# Patient Record
Sex: FEMALE | Race: WHITE | NOT HISPANIC OR LATINO | Employment: OTHER | URBAN - METROPOLITAN AREA
[De-identification: names, ages, dates, MRNs, and addresses within clinical notes are randomized per-mention and may not be internally consistent; named-entity substitution may affect disease eponyms.]

---

## 2019-08-11 LAB
HCV AB SER-ACNC: NEGATIVE
HIV 1+2 AB+HIV1 P24 AG SERPL QL IA: NEGATIVE

## 2019-12-11 ENCOUNTER — TELEPHONE (OUTPATIENT)
Dept: PRIMARY CARE CLINIC | Facility: CLINIC | Age: 49
End: 2019-12-11

## 2019-12-11 NOTE — TELEPHONE ENCOUNTER
Called patient verified medicaid insurance and notified that we can no accept new medicaid patients states understanding

## 2019-12-11 NOTE — TELEPHONE ENCOUNTER
----- Message from Ros García sent at 12/11/2019 12:11 PM CST -----  Contact: Pt  Pt is requesting an appt due to establish care    Pt can be reached at [phone]

## 2020-05-26 ENCOUNTER — OFFICE VISIT (OUTPATIENT)
Dept: OBSTETRICS AND GYNECOLOGY | Facility: CLINIC | Age: 50
End: 2020-05-26
Payer: MEDICAID

## 2020-05-26 VITALS
BODY MASS INDEX: 38.21 KG/M2 | DIASTOLIC BLOOD PRESSURE: 100 MMHG | WEIGHT: 252.13 LBS | SYSTOLIC BLOOD PRESSURE: 126 MMHG | HEIGHT: 68 IN

## 2020-05-26 DIAGNOSIS — R87.619 ABNORMAL CERVICAL PAPANICOLAOU SMEAR, UNSPECIFIED ABNORMAL PAP FINDING: ICD-10-CM

## 2020-05-26 DIAGNOSIS — R10.2 PELVIC PAIN: Primary | ICD-10-CM

## 2020-05-26 DIAGNOSIS — N94.2 VAGINISMUS: ICD-10-CM

## 2020-05-26 PROCEDURE — 88175 CYTOPATH C/V AUTO FLUID REDO: CPT

## 2020-05-26 PROCEDURE — 87624 HPV HI-RISK TYP POOLED RSLT: CPT

## 2020-05-26 PROCEDURE — 99214 OFFICE O/P EST MOD 30 MIN: CPT | Mod: PBBFAC,PN | Performed by: OBSTETRICS & GYNECOLOGY

## 2020-05-26 PROCEDURE — 87481 CANDIDA DNA AMP PROBE: CPT | Mod: 59

## 2020-05-26 PROCEDURE — 87661 TRICHOMONAS VAGINALIS AMPLIF: CPT

## 2020-05-26 PROCEDURE — 99204 PR OFFICE/OUTPT VISIT, NEW, LEVL IV, 45-59 MIN: ICD-10-PCS | Mod: S$PBB,,, | Performed by: OBSTETRICS & GYNECOLOGY

## 2020-05-26 PROCEDURE — 99999 PR PBB SHADOW E&M-EST. PATIENT-LVL IV: CPT | Mod: PBBFAC,,, | Performed by: OBSTETRICS & GYNECOLOGY

## 2020-05-26 PROCEDURE — 99204 OFFICE O/P NEW MOD 45 MIN: CPT | Mod: S$PBB,,, | Performed by: OBSTETRICS & GYNECOLOGY

## 2020-05-26 PROCEDURE — 99999 PR PBB SHADOW E&M-EST. PATIENT-LVL IV: ICD-10-PCS | Mod: PBBFAC,,, | Performed by: OBSTETRICS & GYNECOLOGY

## 2020-05-26 RX ORDER — ZOLPIDEM TARTRATE 10 MG/1
TABLET ORAL
COMMUNITY
Start: 2020-05-18 | End: 2022-07-18 | Stop reason: ALTCHOICE

## 2020-05-26 RX ORDER — TRAZODONE HYDROCHLORIDE 50 MG/1
TABLET ORAL
COMMUNITY
Start: 2020-05-12 | End: 2020-07-14

## 2020-05-26 RX ORDER — ERGOCALCIFEROL 1.25 MG/1
CAPSULE ORAL
COMMUNITY
Start: 2020-05-01

## 2020-05-26 RX ORDER — ALPRAZOLAM 0.5 MG/1
TABLET ORAL
COMMUNITY
Start: 2020-05-12 | End: 2021-11-29

## 2020-05-26 NOTE — PATIENT INSTRUCTIONS
[facility] (all take Medicaid):  1)  [facility] (Beverly Nelson): (p) [phone]. (f) [phone]. Established patients call:  [phone].  2)  [facility]/Trudi Nuñez: (p) [phone]  . (f) [phone].    3)  [facility] (Miranda Cordoba Michael or Kira López): (p) [phone].  Or [phone].   4)  [facility] [place]. (p) [phone].    5)  [facility] (Eleonora Roach): [address] [facility], [place]   [address]. Patients can park in the north entrance and it is on the first floor. (p) [phone] (Melany Yeung, ). (f) [phone].    --no men or defecatory disorders  6)  [facility] (Princess Bowen): (p) [phone].     [facility]:    [place]:  Dr. Sandy Nunez ([facility]: [address], [place], [place] [address]).  (p)  [phone].  (f) [phone]    --Medicaid as secondary/not primary    Tyesha Cee ([place]): (p) [phone].  (f) [phone].    --Medicaid as secondary/not primary    Mariesa Strangos.  [facility].  [address] [phone]  [place] [address].  (p) [phone].  (f) [phone].  --Medicaid as secondary/not primary    Sandy Garcia ([facility]:  [address]):  (p) [phone]. (f) [phone].   --Medicaid as secondary with Medicare/not primary    Greg Barbosa (full-out until Fall 2019), Kimberly Dominguez (full) ([place]). (p) [phone] (f) [phone]. --Takes Medicaid.     [place]:  Tayla Shah ([facility] [place]): (p) [phone].  (f) [phone].  --NO MEDICAID    Gracie Serrano ([facility]: [address] [place], [place] [address]).  Phone: [phone].  Fax:  [phone].  --Medicaid as secondary/not primary    EAST:  --none    WEST:  [facility]  Irene Barillas.  [address] [place] [address].  p [phone].  f  [phone].  --Medicaid as secondary with Medicare or [employer]/not primary

## 2020-05-26 NOTE — PROGRESS NOTES
History & Physical  Gynecology      SUBJECTIVE:     Chief Complaint: Vaginal Pain and Pelvic Pain       History of Present Illness:  51 y/o  reports with complaints of 1 year of pelvic and vulvar pain. Reports a dull ache and also some possibly form of vaginismus, reports pain with penetration and inability to to have intercourse. Possible early menopause, reports night sweats, possible hot flushes.    Had ovarian cysts diagnosed at outside facility. Reports frequent bowel movements and discomfort, alternating constipation and diarrhea, has never been told has IBS.    Surg hx: 21 years ago had hysterectomy with c\section at 6 months PPROM; was told prior to pregnancy she had hyperplasia; did not have oophorectomy at that time. Colon resection due to sepsis after PPROM and C hyst. Gastric bypass. History of 2 c sections total.    Pap hx: abnormal pap smear 4 years ago, did not get follow-up, history of cryotherapy in the past, likely the reasons for the continued pap smears after hystectomy.      Review of patient's allergies indicates:  No Known Allergies    Past Medical History:   Diagnosis Date    Anxiety     Rheumatoid arthritis     Thyroid disease      Past Surgical History:   Procedure Laterality Date     SECTION      GASTRIC BYPASS      HYSTERECTOMY      KNEE CARTILAGE SURGERY Bilateral     x2 to Right, x1 to Left     OB History        3    Para   3    Term                AB        Living           SAB        TAB        Ectopic        Multiple        Live Births                   Family History   Problem Relation Age of Onset    Breast cancer Maternal Grandmother     Colon cancer Neg Hx     Ovarian cancer Neg Hx      Social History     Tobacco Use    Smoking status: Never Smoker    Smokeless tobacco: Never Used   Substance Use Topics    Alcohol use: Yes     Frequency: Never     Comment: occasionally    Drug use: Not Currently       Current Outpatient Medications    Medication Sig    buPROPion (WELLBUTRIN XL) 300 MG 24 hr tablet Take 300 mg by mouth once daily.    cyanocobalamin, vitamin B-12, 1,000 mcg Subl Place under the tongue.    dextroamphetamine-amphetamine (ADDERALL XR) 30 MG 24 hr capsule Take 30 mg by mouth.    DULoxetine (CYMBALTA) 60 MG capsule Take 60 mg by mouth once daily.    ergocalciferol (ERGOCALCIFEROL) 50,000 unit Cap     ferrous sulfate (FEOSOL) 325 mg (65 mg iron) Tab tablet Take 325 mg by mouth.    furosemide (LASIX) 20 MG tablet Take 20 mg by mouth.    levothyroxine (SYNTHROID) 50 MCG tablet Take 50 mcg by mouth once daily.    ALPRAZolam (XANAX) 0.5 MG tablet     conjugated estrogens (PREMARIN) vaginal cream Place a pea-sized amount in vagina every night for 2 weeks, then use 2-3 nights a week    diazepam 10 mg vaginal suppository Place 1 suppository (10 mg total) vaginally once. This compounded medication expires in 30 days for 1 dose    gabapentin (NEURONTIN) 300 MG capsule     HYDROcodone-acetaminophen (NORCO) 5-325 mg per tablet TK 1 T PO Q 6 H PRN FOR PAIN . MAX 4 T DAILY    traZODone (DESYREL) 50 MG tablet     zolpidem (AMBIEN) 10 mg Tab      No current facility-administered medications for this visit.          Review of Systems:  Review of Systems   Constitutional: Negative for appetite change, chills, diaphoresis, fatigue and fever.   Respiratory: Negative for cough and shortness of breath.    Cardiovascular: Negative for chest pain and palpitations.   Gastrointestinal: Negative.  Negative for abdominal pain, constipation, diarrhea, nausea and vomiting.   Genitourinary: Positive for decreased libido, dyspareunia, vaginal pain and vaginal dryness. Negative for bladder incontinence, dysmenorrhea, dysuria, flank pain, frequency, menstrual problem, pelvic pain, urgency, vaginal bleeding, vaginal discharge, postcoital bleeding and vaginal odor.        OBJECTIVE:     Physical Exam:  Physical Exam   Constitutional: She appears  well-developed and well-nourished.   Neck: No tracheal deviation present. No thyromegaly present.   Cardiovascular: Normal rate and regular rhythm. Exam reveals no gallop and no friction rub.   No murmur heard.  Pulmonary/Chest: Effort normal and breath sounds normal. No stridor. No respiratory distress. She has no wheezes. She has no rales. She exhibits no tenderness.   Abdominal: Soft. Bowel sounds are normal.   Genitourinary: No vaginal discharge found.   Genitourinary Comments: Painful pelvic exam for patient, tight levator ani, pain with palpation throughout, vaginismus noted with speculum placement. Punctate bleeding suggestive of low estrogen status.   Lymphadenopathy:     She has no cervical adenopathy.         ASSESSMENT:       ICD-10-CM ICD-9-CM    1. Pelvic pain R10.2 [id number] US Pelvis Comp with Transvag NON-OB (xpd      diazepam 10 mg vaginal suppository      conjugated estrogens (PREMARIN) vaginal cream   2. Abnormal cervical Papanicolaou smear, unspecified abnormal pap finding R87.619 795.00 HPV High Risk Genotypes, PCR      Liquid-Based Pap Smear, Screening   3. Vaginismus N94.2 625.1 diazepam 10 mg vaginal suppository          Plan:      Gisela was seen today for vaginal pain and pelvic pain.    Diagnoses and all orders for this visit:    Pelvic pain  -     US Pelvis Comp with Transvag NON-OB (xpd; Future  -     diazepam 10 mg vaginal suppository; Place 1 suppository (10 mg total) vaginally once. This compounded medication expires in 30 days for 1 dose  -     conjugated estrogens (PREMARIN) vaginal cream; Place a pea-sized amount in vagina every night for 2 weeks, then use 2-3 nights a week    Abnormal cervical Papanicolaou smear, unspecified abnormal pap finding  -     HPV High Risk Genotypes, PCR  -     Liquid-Based Pap Smear, Screening    Vaginismus  -     diazepam 10 mg vaginal suppository; Place 1 suppository (10 mg total) vaginally once. This compounded medication expires in 30 days for 1  dose      Referall placed for pelvic floor PT, f/u in 1 month.    Orders Placed This Encounter   Procedures    HPV High Risk Genotypes, PCR    US Pelvis Comp with Transvag NON-OB (xpd       Follow up in about 1 month (around 6/26/2020) for Pelvic pain.     Counseling time: 30 minutes    LISA Fernandez

## 2020-05-27 LAB
BACTERIAL VAGINOSIS DNA: NEGATIVE
CANDIDA GLABRATA DNA: NEGATIVE
CANDIDA KRUSEI DNA: NEGATIVE
CANDIDA RRNA VAG QL PROBE: NEGATIVE
T VAGINALIS RRNA GENITAL QL PROBE: NEGATIVE

## 2020-05-28 LAB
FINAL PATHOLOGIC DIAGNOSIS: NORMAL
Lab: NORMAL

## 2020-06-02 ENCOUNTER — TELEPHONE (OUTPATIENT)
Dept: OBSTETRICS AND GYNECOLOGY | Facility: CLINIC | Age: 50
End: 2020-06-02

## 2020-06-02 LAB
HPV HR 12 DNA SPEC QL NAA+PROBE: NEGATIVE
HPV16 AG SPEC QL: NEGATIVE
HPV18 DNA SPEC QL NAA+PROBE: NEGATIVE

## 2020-06-02 NOTE — TELEPHONE ENCOUNTER
----- Message from Asim Souza sent at 6/2/2020  4:27 PM CDT -----  Contact: Patient   Patient is calling in on behalf of test results would like to speak w/ staff. Patient contact number [phone]

## 2020-07-07 ENCOUNTER — TELEPHONE (OUTPATIENT)
Dept: PRIMARY CARE CLINIC | Facility: CLINIC | Age: 50
End: 2020-07-07

## 2020-07-07 NOTE — TELEPHONE ENCOUNTER
----- Message from Huma Velazco sent at 7/7/2020  1:11 PM CDT -----  Regarding: Ride to up coming appointment  Contact: Matt Du @ [phone]  Good Afternoon  Patient would like a ride schedule to appointment for tomorrow  Please call and advise

## 2020-07-07 NOTE — TELEPHONE ENCOUNTER
Phoned , pt states she was just in ER and told because of CT results she has to get a biopsy and has to est. With a pcp to get the ref she needs. States she needs a ride to her appt tomorrow. Informed pt it is against HIPPAA for us to give out any information and we do not schedule rides for pts. Instructed pt to contact her ins company or the ride company again. Pt states she will contact ride company

## 2020-07-07 NOTE — TELEPHONE ENCOUNTER
----- Message from Nkechi Mathew sent at 7/7/2020 12:58 PM CDT -----  The patient said the transportation company will pick her up for her appointment tomorrow only if you call. She said it's urgent that she see you tomorrow because, the ER said she needs to get a biopsy. Normally the transportation company require 3 days advance notice.     Please call [phone] to schedule a ride for the patient.    Thank you

## 2020-07-07 NOTE — TELEPHONE ENCOUNTER
Pt said her ins co told her to have doctors ofc call and speak to ride company and tell them she has an appt tomorrow. Informed pt that is confidential information and she has to call the [employer] herself. Pt states understanding

## 2020-07-10 ENCOUNTER — TELEPHONE (OUTPATIENT)
Dept: PRIMARY CARE CLINIC | Facility: CLINIC | Age: 50
End: 2020-07-10

## 2020-07-10 NOTE — TELEPHONE ENCOUNTER
----- Message from Eulalia Juniorry sent at 7/10/2020 11:11 AM CDT -----  Regarding: appt  Contact: self [phone]  Pt states she missed appt on Wednesday with dr canales due to being late. Pt would like to set up another appt sooner to see dr canales for ER f/u appt. Pt states she would like something soon because she needs to have a biopsy done of her neck. Please call and advise

## 2020-07-14 ENCOUNTER — OFFICE VISIT (OUTPATIENT)
Dept: PRIMARY CARE CLINIC | Facility: CLINIC | Age: 50
End: 2020-07-14
Payer: MEDICAID

## 2020-07-14 VITALS
BODY MASS INDEX: 36.77 KG/M2 | HEIGHT: 68 IN | DIASTOLIC BLOOD PRESSURE: 88 MMHG | RESPIRATION RATE: 18 BRPM | SYSTOLIC BLOOD PRESSURE: 130 MMHG | WEIGHT: 242.63 LBS | TEMPERATURE: 99 F | OXYGEN SATURATION: 98 % | HEART RATE: 88 BPM

## 2020-07-14 DIAGNOSIS — F31.9 BIPOLAR 1 DISORDER: Primary | ICD-10-CM

## 2020-07-14 DIAGNOSIS — F43.10 PTSD (POST-TRAUMATIC STRESS DISORDER): ICD-10-CM

## 2020-07-14 DIAGNOSIS — M54.2 NECK PAIN: ICD-10-CM

## 2020-07-14 PROCEDURE — 99999 PR PBB SHADOW E&M-EST. PATIENT-LVL IV: CPT | Mod: PBBFAC,,, | Performed by: FAMILY MEDICINE

## 2020-07-14 PROCEDURE — 99204 PR OFFICE/OUTPT VISIT, NEW, LEVL IV, 45-59 MIN: ICD-10-PCS | Mod: S$PBB,,, | Performed by: FAMILY MEDICINE

## 2020-07-14 PROCEDURE — 99204 OFFICE O/P NEW MOD 45 MIN: CPT | Mod: S$PBB,,, | Performed by: FAMILY MEDICINE

## 2020-07-14 PROCEDURE — 99999 PR PBB SHADOW E&M-EST. PATIENT-LVL IV: ICD-10-PCS | Mod: PBBFAC,,, | Performed by: FAMILY MEDICINE

## 2020-07-14 PROCEDURE — 99214 OFFICE O/P EST MOD 30 MIN: CPT | Mod: PBBFAC,PN | Performed by: FAMILY MEDICINE

## 2020-07-14 RX ORDER — ARIPIPRAZOLE 10 MG/1
10 TABLET ORAL DAILY
Qty: 30 TABLET | Refills: 0 | Status: SHIPPED | OUTPATIENT
Start: 2020-07-14 | End: 2021-11-29

## 2020-07-14 RX ORDER — GABAPENTIN 300 MG/1
300 CAPSULE ORAL NIGHTLY
Qty: 90 CAPSULE | Refills: 0 | Status: SHIPPED | OUTPATIENT
Start: 2020-07-14 | End: 2020-10-14 | Stop reason: SDUPTHER

## 2020-07-14 NOTE — PROGRESS NOTES
"Subjective:       Patient ID: Gisela Monique is a 50 y.o. female.    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 Previously saw her PCP Quoc Hurd. She is most concerned about her swollen lymph nodes in her neck she is concerned is lymphoma. Her biggest complaint today is her neck pain which she blames on osteopenia.   She is not satisfied with her psychiatrist as well. She takes a multiple medicines for sleep and depression but not on a bipolar medicine. Taking wellbutrin 300 mg and duloxetine 60 mg with no recent adjustments. Takes a xanex not on a daily basis and would like to see another psychiatrist as well as a therapist. Her last manic episode was a month ago.      Review of Systems   Constitutional: Negative for activity change, chills and fever.   Respiratory: Negative for cough, chest tightness and shortness of breath.    Cardiovascular: Negative for chest pain.   Gastrointestinal: Negative for abdominal distention, abdominal pain, constipation, diarrhea, nausea and vomiting.   Genitourinary: Negative for difficulty urinating and dysuria.   Musculoskeletal: Positive for arthralgias, neck pain and neck stiffness.   Skin: Negative for rash.   Hematological: Does not bruise/bleed easily.   Psychiatric/Behavioral: Positive for agitation. The patient is nervous/anxious.        Objective:      Vitals:    07/14/20 0858   BP: 130/88   BP Location: Right arm   Patient Position: Sitting   BP Method: Large (Manual)   Pulse: 88   Resp: 18   Temp: 99.2 °F (37.3 °C)   TempSrc: Oral   SpO2: 98%   Weight: 110.1 kg (242 lb 9.9 oz)   Height: 5' 8" (1.727 m)     Physical Exam  Vitals signs and nursing note reviewed.   Constitutional:       Appearance: She is well-developed.   HENT:      Head: Normocephalic and " atraumatic.      Nose: Nose normal.   Eyes:      Conjunctiva/sclera: Conjunctivae normal.   Cardiovascular:      Rate and Rhythm: Normal rate and regular rhythm.      Heart sounds: Normal heart sounds.   Pulmonary:      Effort: Pulmonary effort is normal. No respiratory distress.      Breath sounds: Normal breath sounds. No wheezing or rales.   Abdominal:      General: There is no distension.      Palpations: Abdomen is soft.      Tenderness: There is no abdominal tenderness.   Lymphadenopathy:      Cervical: No cervical adenopathy.   Neurological:      Mental Status: She is alert.      Cranial Nerves: No cranial nerve deficit.             Lab Results   Component Value Date     07/05/2020    K 3.6 07/05/2020     07/05/2020    CO2 21 (L) 07/05/2020    BUN 24 (H) 07/05/2020    CREATININE 0.7 07/05/2020    ANIONGAP 12 07/05/2020     No results found for: HGBA1C  No results found for: BNP, BNPTRIAGEBLO    Lab Results   Component Value Date    WBC 7.80 07/05/2020    HGB 12.5 07/05/2020    HCT 37.3 07/05/2020     07/05/2020    GRAN 5.8 07/05/2020    GRAN 75.0 (H) 07/05/2020     No results found for: CHOL, HDL, LDLCALC, TRIG       Current Outpatient Medications:     ALPRAZolam (XANAX) 0.5 MG tablet, , Disp: , Rfl:     conjugated estrogens (PREMARIN) vaginal cream, Place a pea-sized amount in vagina every night for 2 weeks, then use 2-3 nights a week, Disp: 45 g, Rfl: 12    cyanocobalamin, vitamin B-12, 1,000 mcg Subl, Place under the tongue., Disp: , Rfl:     dextroamphetamine-amphetamine (ADDERALL XR) 30 MG 24 hr capsule, Take 30 mg by mouth., Disp: , Rfl:     dicyclomine (BENTYL) 20 mg tablet, Take 1 tablet (20 mg total) by mouth 3 (three) times daily as needed (abdominal cramps)., Disp: 20 tablet, Rfl: 0    DULoxetine (CYMBALTA) 60 MG capsule, Take 60 mg by mouth once daily., Disp: , Rfl:     ergocalciferol (ERGOCALCIFEROL) 50,000 unit Cap, , Disp: , Rfl:     furosemide (LASIX) 20 MG tablet,  Take 20 mg by mouth., Disp: , Rfl:     gabapentin (NEURONTIN) 300 MG capsule, Take 1 capsule (300 mg total) by mouth every evening., Disp: 90 capsule, Rfl: 0    levothyroxine (SYNTHROID) 50 MCG tablet, Take 50 mcg by mouth once daily., Disp: , Rfl:     zolpidem (AMBIEN) 10 mg Tab, , Disp: , Rfl:     ARIPiprazole (ABILIFY) 10 MG Tab, Take 1 tablet (10 mg total) by mouth once daily., Disp: 30 tablet, Rfl: 0    ferrous sulfate (FEOSOL) 325 mg (65 mg iron) Tab tablet, Take 325 mg by mouth., Disp: , Rfl:         Assessment:       1. Bipolar 1 disorder    2. PTSD (post-traumatic stress disorder)    3. Neck pain           Plan:       Bipolar 1 disorder  -     ARIPiprazole (ABILIFY) 10 MG Tab; Take 1 tablet (10 mg total) by mouth once daily.  Dispense: 30 tablet; Refill: 0  Does not feel her bp is helped by wellbutrin. Dc wellbutrin and starting abilify. Not manic currently. Averages one manic episode a month. Continue duloxetine. Due to insurance referred to [facility].    PTSD (post-traumatic stress disorder)  -     Ambulatory referral/consult to Psychiatry; Future; Expected date: 07/21/2020  Referred for therapy as hx of significant trauma    Neck pain  -     gabapentin (NEURONTIN) 300 MG capsule; Take 1 capsule (300 mg total) by mouth every evening.  Dispense: 90 capsule; Refill: 0  Chronic. Getting med records.

## 2020-07-26 ENCOUNTER — NURSE TRIAGE (OUTPATIENT)
Dept: ADMINISTRATIVE | Facility: CLINIC | Age: 50
End: 2020-07-26

## 2020-07-27 NOTE — TELEPHONE ENCOUNTER
Spoke with patient she states that she has 9/10 pain near her anal area.  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  Patient states she feel the same way she did before when this happened.   She states that she can feel a cord/knot underneath her skin in the perineum region.  She also states that she has pain in her groin and abdominal area.  Patient reports having increased abdominal bloating and states she just doesn't feel right.  Advised patient to go to ER for further evaluation.  Patient verbalized understanding.      Reason for Disposition   Nursing judgment or information in reference    Protocols used: NO GUIDELINE [id number]

## 2020-07-27 NOTE — TELEPHONE ENCOUNTER
As we discussed on the phone we'll be having an in person evaluation tomorrow am to see if a referral is necessary.

## 2020-09-04 DIAGNOSIS — Z12.11 COLON CANCER SCREENING: ICD-10-CM

## 2020-10-05 ENCOUNTER — PATIENT MESSAGE (OUTPATIENT)
Dept: ADMINISTRATIVE | Facility: HOSPITAL | Age: 50
End: 2020-10-05

## 2020-10-14 DIAGNOSIS — M54.2 NECK PAIN: Primary | ICD-10-CM

## 2020-10-20 RX ORDER — GABAPENTIN 300 MG/1
300 CAPSULE ORAL NIGHTLY
Qty: 90 CAPSULE | Refills: 0 | Status: SHIPPED | OUTPATIENT
Start: 2020-10-20 | End: 2021-11-29

## 2021-04-05 ENCOUNTER — PATIENT MESSAGE (OUTPATIENT)
Dept: ADMINISTRATIVE | Facility: HOSPITAL | Age: 51
End: 2021-04-05

## 2021-04-26 ENCOUNTER — TELEPHONE (OUTPATIENT)
Dept: ORTHOPEDICS | Facility: CLINIC | Age: 51
End: 2021-04-26

## 2021-07-06 ENCOUNTER — PATIENT MESSAGE (OUTPATIENT)
Dept: ADMINISTRATIVE | Facility: HOSPITAL | Age: 51
End: 2021-07-06

## 2021-07-29 ENCOUNTER — PATIENT MESSAGE (OUTPATIENT)
Dept: ADMINISTRATIVE | Facility: HOSPITAL | Age: 51
End: 2021-07-29

## 2021-07-29 ENCOUNTER — TELEPHONE (OUTPATIENT)
Dept: PRIMARY CARE CLINIC | Facility: CLINIC | Age: 51
End: 2021-07-29

## 2021-07-30 ENCOUNTER — PATIENT OUTREACH (OUTPATIENT)
Dept: ADMINISTRATIVE | Facility: HOSPITAL | Age: 51
End: 2021-07-30

## 2021-08-09 ENCOUNTER — PATIENT OUTREACH (OUTPATIENT)
Dept: ADMINISTRATIVE | Facility: HOSPITAL | Age: 51
End: 2021-08-09

## 2021-10-05 ENCOUNTER — PATIENT MESSAGE (OUTPATIENT)
Dept: ADMINISTRATIVE | Facility: HOSPITAL | Age: 51
End: 2021-10-05

## 2021-11-17 ENCOUNTER — TELEPHONE (OUTPATIENT)
Dept: PRIMARY CARE CLINIC | Facility: CLINIC | Age: 51
End: 2021-11-17
Payer: MEDICAID

## 2021-11-19 ENCOUNTER — TELEPHONE (OUTPATIENT)
Dept: PRIMARY CARE CLINIC | Facility: CLINIC | Age: 51
End: 2021-11-19
Payer: MEDICAID

## 2021-11-29 ENCOUNTER — OFFICE VISIT (OUTPATIENT)
Dept: PRIMARY CARE CLINIC | Facility: CLINIC | Age: 51
End: 2021-11-29
Payer: MEDICAID

## 2021-11-29 VITALS
OXYGEN SATURATION: 97 % | DIASTOLIC BLOOD PRESSURE: 88 MMHG | WEIGHT: 264.31 LBS | HEART RATE: 93 BPM | RESPIRATION RATE: 18 BRPM | HEIGHT: 68 IN | SYSTOLIC BLOOD PRESSURE: 132 MMHG | BODY MASS INDEX: 40.06 KG/M2

## 2021-11-29 DIAGNOSIS — Z23 NEED FOR VACCINATION: ICD-10-CM

## 2021-11-29 DIAGNOSIS — Z12.31 BREAST CANCER SCREENING BY MAMMOGRAM: ICD-10-CM

## 2021-11-29 DIAGNOSIS — Z71.85 VACCINE COUNSELING: ICD-10-CM

## 2021-11-29 DIAGNOSIS — R35.0 INCREASED FREQUENCY OF URINATION: ICD-10-CM

## 2021-11-29 DIAGNOSIS — Z09 HOSPITAL DISCHARGE FOLLOW-UP: Primary | ICD-10-CM

## 2021-11-29 DIAGNOSIS — D64.9 ANEMIA, UNSPECIFIED TYPE: ICD-10-CM

## 2021-11-29 DIAGNOSIS — R79.1 PROLONGED PTT (PARTIAL THROMBOPLASTIN TIME): ICD-10-CM

## 2021-11-29 PROCEDURE — 90472 IMMUNIZATION ADMIN EACH ADD: CPT | Mod: PBBFAC,PN

## 2021-11-29 PROCEDURE — 99999 PR PBB SHADOW E&M-EST. PATIENT-LVL IV: ICD-10-PCS | Mod: PBBFAC,,, | Performed by: FAMILY MEDICINE

## 2021-11-29 PROCEDURE — 99215 OFFICE O/P EST HI 40 MIN: CPT | Mod: S$PBB,,, | Performed by: FAMILY MEDICINE

## 2021-11-29 PROCEDURE — 90686 IIV4 VACC NO PRSV 0.5 ML IM: CPT | Mod: PBBFAC,PN

## 2021-11-29 PROCEDURE — 99215 PR OFFICE/OUTPT VISIT, EST, LEVL V, 40-54 MIN: ICD-10-PCS | Mod: S$PBB,,, | Performed by: FAMILY MEDICINE

## 2021-11-29 PROCEDURE — 99214 OFFICE O/P EST MOD 30 MIN: CPT | Mod: PBBFAC,PN | Performed by: FAMILY MEDICINE

## 2021-11-29 PROCEDURE — 99999 PR PBB SHADOW E&M-EST. PATIENT-LVL IV: CPT | Mod: PBBFAC,,, | Performed by: FAMILY MEDICINE

## 2021-11-29 RX ORDER — AMOXICILLIN AND CLAVULANATE POTASSIUM 875; 125 MG/1; MG/1
1 TABLET, FILM COATED ORAL 2 TIMES DAILY
COMMUNITY
Start: 2021-11-01 | End: 2021-11-29

## 2021-11-29 RX ORDER — BUPROPION HYDROCHLORIDE 200 MG/1
200 TABLET, EXTENDED RELEASE ORAL
Status: ON HOLD | COMMUNITY
Start: 2021-01-22 | End: 2023-03-27

## 2021-11-29 RX ORDER — DICYCLOMINE HYDROCHLORIDE 20 MG/1
20 TABLET ORAL EVERY 8 HOURS PRN
COMMUNITY
Start: 2021-11-04 | End: 2022-02-17

## 2021-11-30 ENCOUNTER — TELEPHONE (OUTPATIENT)
Dept: PRIMARY CARE CLINIC | Facility: CLINIC | Age: 51
End: 2021-11-30
Payer: MEDICAID

## 2021-12-07 ENCOUNTER — TELEPHONE (OUTPATIENT)
Dept: HEMATOLOGY/ONCOLOGY | Facility: CLINIC | Age: 51
End: 2021-12-07
Payer: MEDICAID

## 2021-12-13 ENCOUNTER — PATIENT OUTREACH (OUTPATIENT)
Dept: ADMINISTRATIVE | Facility: OTHER | Age: 51
End: 2021-12-13
Payer: MEDICAID

## 2022-01-21 ENCOUNTER — PATIENT MESSAGE (OUTPATIENT)
Dept: ADMINISTRATIVE | Facility: HOSPITAL | Age: 52
End: 2022-01-21
Payer: MEDICAID

## 2022-01-31 ENCOUNTER — TELEPHONE (OUTPATIENT)
Dept: HEMATOLOGY/ONCOLOGY | Facility: CLINIC | Age: 52
End: 2022-01-31
Payer: MEDICAID

## 2022-01-31 NOTE — TELEPHONE ENCOUNTER
----- Message from Aura Murillo sent at 1/31/2022 12:36 PM CST -----  Regarding: Pt requesting call back  Name of Who is Calling:SISSY MOLINA [[id number]]          What is the request in detail: Pt is requesting a call back, she wants a regular appt and not a virtual .           Can the clinic reply by [facility]: no          What Number to Call Back [facility]:[phone]

## 2022-01-31 NOTE — TELEPHONE ENCOUNTER
Called Ms. Kaur about logging into her appt. States she doesn't know how and she doesn't know her password.  Explained that she will need to reset her password, log in, do the epre check in and click begin. If any problems, please call [phone] option 3.

## 2022-01-31 NOTE — TELEPHONE ENCOUNTER
----- Message from Julio Crowe sent at 1/31/2022 12:53 PM CST -----  Name of Who is Calling: Matt ([facility])       What is the request in detail: Matt is calling to speak to the nurse in regards to the patient having trouble connecting to the virtual visit, he would like to get the patient to have labs done ASAP,. Please advise          Can the clinic reply by [facility]: No         What Number to Call Back if not in [facility]: [phone]

## 2022-01-31 NOTE — TELEPHONE ENCOUNTER
Returned call and spoke with Ms Monique. Ms Monique was unable to log on for her virtual visit this morning with Dr Garsia. She's calling to rescheduled her appointment to an in clinic visit. Done. The appointment has been rescheduled to in clinic visit on 02/03/2022 @ 3:00 pm. Directions to our clinic given.

## 2022-02-02 ENCOUNTER — LAB VISIT (OUTPATIENT)
Dept: LAB | Facility: OTHER | Age: 52
End: 2022-02-02
Attending: STUDENT IN AN ORGANIZED HEALTH CARE EDUCATION/TRAINING PROGRAM
Payer: MEDICAID

## 2022-02-02 ENCOUNTER — OFFICE VISIT (OUTPATIENT)
Dept: HEMATOLOGY/ONCOLOGY | Facility: CLINIC | Age: 52
End: 2022-02-02
Payer: MEDICAID

## 2022-02-02 VITALS
OXYGEN SATURATION: 100 % | HEIGHT: 69 IN | BODY MASS INDEX: 38.04 KG/M2 | RESPIRATION RATE: 20 BRPM | HEART RATE: 126 BPM | SYSTOLIC BLOOD PRESSURE: 140 MMHG | WEIGHT: 256.81 LBS | DIASTOLIC BLOOD PRESSURE: 102 MMHG | TEMPERATURE: 100 F

## 2022-02-02 DIAGNOSIS — Z87.19 HISTORY OF GASTROINTESTINAL BLEEDING: ICD-10-CM

## 2022-02-02 DIAGNOSIS — D50.0 IRON DEFICIENCY ANEMIA DUE TO CHRONIC BLOOD LOSS: ICD-10-CM

## 2022-02-02 DIAGNOSIS — Z98.890 STATUS POST GASTRIC SURGERY: ICD-10-CM

## 2022-02-02 DIAGNOSIS — M06.9 RHEUMATOID ARTHRITIS, INVOLVING UNSPECIFIED SITE, UNSPECIFIED WHETHER RHEUMATOID FACTOR PRESENT: ICD-10-CM

## 2022-02-02 DIAGNOSIS — D53.9 NUTRITIONAL ANEMIA: ICD-10-CM

## 2022-02-02 DIAGNOSIS — D64.9 ANEMIA, UNSPECIFIED TYPE: Primary | ICD-10-CM

## 2022-02-02 DIAGNOSIS — K92.1 MELENA: ICD-10-CM

## 2022-02-02 DIAGNOSIS — F41.9 ANXIETY: ICD-10-CM

## 2022-02-02 DIAGNOSIS — Z85.42 HISTORY OF UTERINE CANCER: ICD-10-CM

## 2022-02-02 DIAGNOSIS — D64.9 ANEMIA, UNSPECIFIED TYPE: ICD-10-CM

## 2022-02-02 PROBLEM — D50.9 IRON DEFICIENCY ANEMIA: Status: ACTIVE | Noted: 2022-02-02

## 2022-02-02 LAB
ABO + RH BLD: NORMAL
ALBUMIN SERPL BCP-MCNC: 4.1 G/DL (ref 3.5–5.2)
ALP SERPL-CCNC: 102 U/L (ref 55–135)
ALT SERPL W/O P-5'-P-CCNC: 18 U/L (ref 10–44)
ANION GAP SERPL CALC-SCNC: 12 MMOL/L (ref 8–16)
AST SERPL-CCNC: 25 U/L (ref 10–40)
BASOPHILS # BLD AUTO: 0.09 K/UL (ref 0–0.2)
BASOPHILS NFR BLD: 1.1 % (ref 0–1.9)
BILIRUB SERPL-MCNC: 0.4 MG/DL (ref 0.1–1)
BLD GP AB SCN CELLS X3 SERPL QL: NORMAL
BUN SERPL-MCNC: 11 MG/DL (ref 6–20)
CALCIUM SERPL-MCNC: 9.5 MG/DL (ref 8.7–10.5)
CHLORIDE SERPL-SCNC: 104 MMOL/L (ref 95–110)
CO2 SERPL-SCNC: 23 MMOL/L (ref 23–29)
CREAT SERPL-MCNC: 0.7 MG/DL (ref 0.5–1.4)
DIFFERENTIAL METHOD: ABNORMAL
EOSINOPHIL # BLD AUTO: 0.1 K/UL (ref 0–0.5)
EOSINOPHIL NFR BLD: 1 % (ref 0–8)
ERYTHROCYTE [DISTWIDTH] IN BLOOD BY AUTOMATED COUNT: 17.5 % (ref 11.5–14.5)
ERYTHROCYTE [SEDIMENTATION RATE] IN BLOOD: 11 MM/HR (ref 0–20)
EST. GFR  (AFRICAN AMERICAN): >60 ML/MIN/1.73 M^2
EST. GFR  (NON AFRICAN AMERICAN): >60 ML/MIN/1.73 M^2
FERRITIN SERPL-MCNC: 12 NG/ML (ref 20–300)
FOLATE SERPL-MCNC: 17.3 NG/ML (ref 4–24)
GLUCOSE SERPL-MCNC: 117 MG/DL (ref 70–110)
HCT VFR BLD AUTO: 40.8 % (ref 37–48.5)
HGB BLD-MCNC: 12.8 G/DL (ref 12–16)
IMM GRANULOCYTES # BLD AUTO: 0.03 K/UL (ref 0–0.04)
IMM GRANULOCYTES NFR BLD AUTO: 0.4 % (ref 0–0.5)
IRON SERPL-MCNC: 161 UG/DL (ref 30–160)
LDH SERPL L TO P-CCNC: 200 U/L (ref 110–260)
LYMPHOCYTES # BLD AUTO: 2.4 K/UL (ref 1–4.8)
LYMPHOCYTES NFR BLD: 30.7 % (ref 18–48)
MCH RBC QN AUTO: 27.2 PG (ref 27–31)
MCHC RBC AUTO-ENTMCNC: 31.4 G/DL (ref 32–36)
MCV RBC AUTO: 87 FL (ref 82–98)
MONOCYTES # BLD AUTO: 0.7 K/UL (ref 0.3–1)
MONOCYTES NFR BLD: 8.6 % (ref 4–15)
NEUTROPHILS # BLD AUTO: 4.6 K/UL (ref 1.8–7.7)
NEUTROPHILS NFR BLD: 58.2 % (ref 38–73)
NRBC BLD-RTO: 0 /100 WBC
PLATELET # BLD AUTO: 332 K/UL (ref 150–450)
PMV BLD AUTO: 9.4 FL (ref 9.2–12.9)
POTASSIUM SERPL-SCNC: 4.1 MMOL/L (ref 3.5–5.1)
PROT SERPL-MCNC: 7.8 G/DL (ref 6–8.4)
RBC # BLD AUTO: 4.71 M/UL (ref 4–5.4)
RETICS/RBC NFR AUTO: 1.2 % (ref 0.5–2.5)
SATURATED IRON: 21 % (ref 20–50)
SODIUM SERPL-SCNC: 139 MMOL/L (ref 136–145)
TOTAL IRON BINDING CAPACITY: 767 UG/DL (ref 250–450)
TRANSFERRIN SERPL-MCNC: 518 MG/DL (ref 200–375)
VIT B12 SERPL-MCNC: 226 PG/ML (ref 210–950)
WBC # BLD AUTO: 7.94 K/UL (ref 3.9–12.7)

## 2022-02-02 PROCEDURE — 3008F PR BODY MASS INDEX (BMI) DOCUMENTED: ICD-10-PCS | Mod: CPTII,,, | Performed by: STUDENT IN AN ORGANIZED HEALTH CARE EDUCATION/TRAINING PROGRAM

## 2022-02-02 PROCEDURE — 1159F PR MEDICATION LIST DOCUMENTED IN MEDICAL RECORD: ICD-10-PCS | Mod: CPTII,,, | Performed by: STUDENT IN AN ORGANIZED HEALTH CARE EDUCATION/TRAINING PROGRAM

## 2022-02-02 PROCEDURE — 99204 PR OFFICE/OUTPT VISIT, NEW, LEVL IV, 45-59 MIN: ICD-10-PCS | Mod: S$PBB,,, | Performed by: STUDENT IN AN ORGANIZED HEALTH CARE EDUCATION/TRAINING PROGRAM

## 2022-02-02 PROCEDURE — 1160F RVW MEDS BY RX/DR IN RCRD: CPT | Mod: CPTII,,, | Performed by: STUDENT IN AN ORGANIZED HEALTH CARE EDUCATION/TRAINING PROGRAM

## 2022-02-02 PROCEDURE — 3008F BODY MASS INDEX DOCD: CPT | Mod: CPTII,,, | Performed by: STUDENT IN AN ORGANIZED HEALTH CARE EDUCATION/TRAINING PROGRAM

## 2022-02-02 PROCEDURE — 82728 ASSAY OF FERRITIN: CPT | Performed by: STUDENT IN AN ORGANIZED HEALTH CARE EDUCATION/TRAINING PROGRAM

## 2022-02-02 PROCEDURE — 1159F MED LIST DOCD IN RCRD: CPT | Mod: CPTII,,, | Performed by: STUDENT IN AN ORGANIZED HEALTH CARE EDUCATION/TRAINING PROGRAM

## 2022-02-02 PROCEDURE — 3080F PR MOST RECENT DIASTOLIC BLOOD PRESSURE >= 90 MM HG: ICD-10-PCS | Mod: CPTII,,, | Performed by: STUDENT IN AN ORGANIZED HEALTH CARE EDUCATION/TRAINING PROGRAM

## 2022-02-02 PROCEDURE — 1160F PR REVIEW ALL MEDS BY PRESCRIBER/CLIN PHARMACIST DOCUMENTED: ICD-10-PCS | Mod: CPTII,,, | Performed by: STUDENT IN AN ORGANIZED HEALTH CARE EDUCATION/TRAINING PROGRAM

## 2022-02-02 PROCEDURE — 85651 RBC SED RATE NONAUTOMATED: CPT | Performed by: STUDENT IN AN ORGANIZED HEALTH CARE EDUCATION/TRAINING PROGRAM

## 2022-02-02 PROCEDURE — 82746 ASSAY OF FOLIC ACID SERUM: CPT | Performed by: STUDENT IN AN ORGANIZED HEALTH CARE EDUCATION/TRAINING PROGRAM

## 2022-02-02 PROCEDURE — 85045 AUTOMATED RETICULOCYTE COUNT: CPT | Performed by: STUDENT IN AN ORGANIZED HEALTH CARE EDUCATION/TRAINING PROGRAM

## 2022-02-02 PROCEDURE — 82607 VITAMIN B-12: CPT | Performed by: STUDENT IN AN ORGANIZED HEALTH CARE EDUCATION/TRAINING PROGRAM

## 2022-02-02 PROCEDURE — 82525 ASSAY OF COPPER: CPT | Performed by: STUDENT IN AN ORGANIZED HEALTH CARE EDUCATION/TRAINING PROGRAM

## 2022-02-02 PROCEDURE — 84466 ASSAY OF TRANSFERRIN: CPT | Performed by: STUDENT IN AN ORGANIZED HEALTH CARE EDUCATION/TRAINING PROGRAM

## 2022-02-02 PROCEDURE — 85025 COMPLETE CBC W/AUTO DIFF WBC: CPT | Performed by: STUDENT IN AN ORGANIZED HEALTH CARE EDUCATION/TRAINING PROGRAM

## 2022-02-02 PROCEDURE — 84630 ASSAY OF ZINC: CPT | Performed by: STUDENT IN AN ORGANIZED HEALTH CARE EDUCATION/TRAINING PROGRAM

## 2022-02-02 PROCEDURE — 99204 OFFICE O/P NEW MOD 45 MIN: CPT | Mod: S$PBB,,, | Performed by: STUDENT IN AN ORGANIZED HEALTH CARE EDUCATION/TRAINING PROGRAM

## 2022-02-02 PROCEDURE — 3080F DIAST BP >= 90 MM HG: CPT | Mod: CPTII,,, | Performed by: STUDENT IN AN ORGANIZED HEALTH CARE EDUCATION/TRAINING PROGRAM

## 2022-02-02 PROCEDURE — 80053 COMPREHEN METABOLIC PANEL: CPT | Performed by: STUDENT IN AN ORGANIZED HEALTH CARE EDUCATION/TRAINING PROGRAM

## 2022-02-02 PROCEDURE — 36415 COLL VENOUS BLD VENIPUNCTURE: CPT | Performed by: STUDENT IN AN ORGANIZED HEALTH CARE EDUCATION/TRAINING PROGRAM

## 2022-02-02 PROCEDURE — 3077F PR MOST RECENT SYSTOLIC BLOOD PRESSURE >= 140 MM HG: ICD-10-PCS | Mod: CPTII,,, | Performed by: STUDENT IN AN ORGANIZED HEALTH CARE EDUCATION/TRAINING PROGRAM

## 2022-02-02 PROCEDURE — 99214 OFFICE O/P EST MOD 30 MIN: CPT | Mod: PBBFAC | Performed by: STUDENT IN AN ORGANIZED HEALTH CARE EDUCATION/TRAINING PROGRAM

## 2022-02-02 PROCEDURE — 99999 PR PBB SHADOW E&M-EST. PATIENT-LVL IV: ICD-10-PCS | Mod: PBBFAC,,, | Performed by: STUDENT IN AN ORGANIZED HEALTH CARE EDUCATION/TRAINING PROGRAM

## 2022-02-02 PROCEDURE — 83615 LACTATE (LD) (LDH) ENZYME: CPT | Performed by: STUDENT IN AN ORGANIZED HEALTH CARE EDUCATION/TRAINING PROGRAM

## 2022-02-02 PROCEDURE — 83921 ORGANIC ACID SINGLE QUANT: CPT | Performed by: STUDENT IN AN ORGANIZED HEALTH CARE EDUCATION/TRAINING PROGRAM

## 2022-02-02 PROCEDURE — 86850 RBC ANTIBODY SCREEN: CPT | Performed by: STUDENT IN AN ORGANIZED HEALTH CARE EDUCATION/TRAINING PROGRAM

## 2022-02-02 PROCEDURE — 99999 PR PBB SHADOW E&M-EST. PATIENT-LVL IV: CPT | Mod: PBBFAC,,, | Performed by: STUDENT IN AN ORGANIZED HEALTH CARE EDUCATION/TRAINING PROGRAM

## 2022-02-02 PROCEDURE — 3077F SYST BP >= 140 MM HG: CPT | Mod: CPTII,,, | Performed by: STUDENT IN AN ORGANIZED HEALTH CARE EDUCATION/TRAINING PROGRAM

## 2022-02-02 NOTE — PROGRESS NOTES
"PATIENT: Gisela Monique  MRN: [id number]  DATE: 2022      Diagnosis:   1. Anemia, unspecified type    2. Rheumatoid arthritis, involving unspecified site, unspecified whether rheumatoid factor present    3. History of uterine cancer    4. Status post gastric surgery    5. History of gastrointestinal bleeding    6. Melena    7. Nutritional anemia    8. Iron deficiency anemia due to chronic blood loss        Chief Complaint: Anemia      Subjective:    Initial History: Ms. Monique is a 52 y.o. female with medical history of anxiety, rheumatoid arthritis, osteopenia/osteoporosis, SAUMYA, and hypothyroidism who was recently admitted for GI bleed presenting for follow up of anemia. Pt also has a history of partial hysterectomy due to uterine cancer and gastric bypass. Was hospitalized in October with GI bleed with EGD and CTA abd/pelvis negative for active site of bleed. Was transfused 2 units of pRBC and discharged as bleeding had stabilized. Pt reports dark stools resolved about a week later and she was feeling great until recently.  States the black tarry stools have resumed about 8 days ago with 3-4 episodes per day. This has led to severe fatigue and dyspnea on exertion. Has also had constant epigastric pain during this time. States she takes a lot of vitamins but only intermittently takes iron. Denies tobacco use but does have 3-4 glasses of wine per week. Mother has an anemia that she does not know which type and has an uncle that has the "good type of leukemia." Denies any other family history of anemia or malignancy.    Past Medical History:   Past Medical History:   Diagnosis Date    Anxiety     Rheumatoid arthritis     Thyroid disease        Past Surgical HIstory:   Past Surgical History:   Procedure Laterality Date     SECTION      GASTRIC BYPASS      HYSTERECTOMY      KNEE CARTILAGE SURGERY Bilateral     x2 to Right, x1 to Left       Family History:   Family History   Problem Relation Age of " Onset    Breast cancer Maternal Grandmother     Colon cancer Neg Hx     Ovarian cancer Neg Hx        Social History:  reports that she has never smoked. She has never used smokeless tobacco. She reports current alcohol use. She reports previous drug use.    Allergies:  Review of patient's allergies indicates:  No Known Allergies    Medications:  Current Outpatient Medications   Medication Sig Dispense Refill    buPROPion (WELLBUTRIN SR) 200 MG SR12 Take 200 mg by mouth.      cyanocobalamin, vitamin B-12, 1,000 mcg Subl Place under the tongue.      DULoxetine (CYMBALTA) 60 MG capsule Take 60 mg by mouth once daily.      ergocalciferol (ERGOCALCIFEROL) 50,000 unit Cap       ferrous sulfate (FEOSOL) 325 mg (65 mg iron) Tab tablet Take 325 mg by mouth.      levothyroxine (SYNTHROID) 50 MCG tablet Take 50 mcg by mouth once daily.      zolpidem (AMBIEN) 10 mg Tab       dicyclomine (BENTYL) 20 mg tablet Take 20 mg by mouth every 8 (eight) hours as needed.       No current facility-administered medications for this visit.       Review of Systems   Constitutional: Positive for fatigue. Negative for fever and unexpected weight change.   HENT: Negative for nosebleeds and sore throat.    Eyes: Negative for photophobia and visual disturbance.   Respiratory: Positive for shortness of breath (with exertion). Negative for cough.    Cardiovascular: Positive for leg swelling. Negative for chest pain.   Gastrointestinal: Positive for abdominal pain, blood in stool and diarrhea. Negative for constipation, nausea and vomiting.   Skin: Negative for rash and wound.   Neurological: Negative for light-headedness and headaches.   Hematological: Negative for adenopathy. Does not bruise/bleed easily.   Psychiatric/Behavioral: Negative for confusion. The patient is nervous/anxious.          Objective:      Vitals:   Vitals:    02/02/22 1445   BP: (!) 140/102   BP Location: Left arm   Patient Position: Sitting   BP Method: Medium  "(Automatic)   Pulse: (!) 126   Resp: 20   Temp: 100 °F (37.8 °C)   TempSrc: Oral   SpO2: 100%   Weight: 116.5 kg (256 lb 13.4 oz)   Height: 5' 8.5" (1.74 m)       Physical Exam  Vitals and nursing note reviewed.   Constitutional:       Appearance: Normal appearance.   HENT:      Head: Normocephalic and atraumatic.      Mouth/Throat:      Mouth: Mucous membranes are moist.      Pharynx: Oropharynx is clear.   Eyes:      Extraocular Movements: Extraocular movements intact.      Comments: conjunctival palllor   Cardiovascular:      Rate and Rhythm: Regular rhythm. Tachycardia present.   Pulmonary:      Effort: Pulmonary effort is normal. No respiratory distress.   Abdominal:      General: There is no distension.      Palpations: Abdomen is soft.      Tenderness: There is abdominal tenderness (epigastric).   Musculoskeletal:      Right lower leg: No edema.      Left lower leg: No edema.   Skin:     General: Skin is warm and dry.   Neurological:      General: No focal deficit present.      Mental Status: She is alert and oriented to person, place, and time.   Psychiatric:      Comments: anxious         Laboratory Data:  No visits with results within 1 Week(s) from this visit.   Latest known visit with results is:   Lab Visit on 11/29/2021   Component Date Value Ref Range Status    Specimen UA 11/29/2021 Urine, Clean Catch   Final    Color, UA 11/29/2021 Yellow  Yellow, Straw, Tari Final    Appearance, UA 11/29/2021 Clear  Clear Final    pH, UA 11/29/2021 6.0  5.0 - 8.0 Final    Specific [place], UA 11/29/2021 <=1.005  1.005 - 1.030 Final    Protein, UA 11/29/2021 Negative  Negative Final    Comment: Recommend a 24 hour urine protein or a urine   protein/creatinine ratio if globulin induced proteinuria is  clinically suspected.      Glucose, UA 11/29/2021 Negative  Negative Final    Ketones, UA 11/29/2021 Negative  Negative Final    Bilirubin (UA) 11/29/2021 Negative  Negative Final    Occult Blood UA " 11/29/2021 Negative  Negative Final    Nitrite, UA 11/29/2021 Negative  Negative Final    Urobilinogen, UA 11/29/2021 Negative  Negative EU/dL Final    Leukocytes, UA 11/29/2021 Negative  Negative Final         Imaging:   CT angiogram of the abdomen and pelvis (11/1/2021):  Calcified left lower lobe granuloma. Lung bases are otherwise clear. Bone window images demonstrate no acute or aggressive osseous abnormalities. Anterolisthesis of L4 on L5, with lower lumbar facet arthropathy at L4-5 and L5-S1.  No focal hepatic lesion evident on multiphasic imaging. Gallbladder surgically absent. Mild prominence of the extra hepatic bile duct is likely on the basis of cholecystectomy change. Spleen, pancreas, and adrenal glands are unremarkable. No renal calculi. No hydronephrosis. Small left renal cyst. Ureters are normal in caliber. Urinary bladder is unremarkable.  Status post gastric bypass. No evidence of enteric leak. No evidence of obstruction. Clustered small bowel with jejunojejunal anastomosis within the right aspect of the abdomen, representing an interval change when this was previously located on the left. This raises the possibility of internal hernia. No evidence of acute colitis. Distal colonic diverticula.  No contrast extravasation is evident on arterial phase imaging. No free fluid or free air within the abdomen or pelvis. No pathologically enlarged abdominal or pelvic lymph nodes. Uterus is surgically absent.  IMPRESSION:   No CT evidence of active extravasation or identifiable localized site of gastrointestinal hemorrhage. Consider nuclear medicine GI bleed scan if symptoms persist.  Status post gastric bypass. Clustered small bowel associated with jejunojejunal anastomosis - previously located to the left of midline, now located to the right of midline, suggesting internal hernia.  Status post cholecystectomy and hysterectomy.    Esophagogastroduodenoscopy (11/1/2021):  FINDINGS:  The upper 3rd of the  esophagus and middle 3rd of the esophagus were normal.  LA grade a (1 or more mucosal breaks less than 5 mm, not extending between tops of 2 mucosal folds) esophagitis with no bleeding was found in the upper 3rd of the esophagus.  There is no endoscopic evidence of ulcerations or varices in the entire esophagus.  Esophagogastric landmarks were identified: The Z-line was found at 35 cm from the incisors.  The stomach was normal.  Evidence of a gastric bypass was found in the gastric body. This was characterized by healing appearing mucosa. Biopsies were taken with cold forceps for histology. The pathology was placed into bottle number 1.  There is no endoscopic evidence of bleeding, ulceration or varices in the entire examined stomach. The examined jejunum was normal. There was no endoscopic evidence of bleeding or ulceration in the jejunum.  IMPRESSION:  - Normal upper third of esophagus and middle third of esophagus.  - LA Grade A reflux esophagitis with no bleeding.  - Esophagogastric landmarks identified.  - Normal stomach.  - A gastric bypass was found, characterized by healthy appearing mucosa. Biopsied.  - Normal examined jejunum.          Assessment:       1. Anemia, unspecified type    2. Rheumatoid arthritis, involving unspecified site, unspecified whether rheumatoid factor present    3. History of uterine cancer    4. Status post gastric surgery    5. History of gastrointestinal bleeding    6. Melena    7. Nutritional anemia    8. Iron deficiency anemia due to chronic blood loss           Plan:     Anemia  - Recent GI bleed requiring transfusion in October 2021  - CTA abd/pelvis and EGD did not reveal source, will need colonoscopy and possibly may require capsule endoscopy; will touch base with GI, currently follows with Dr. Osuna at [facility]  - Will check CBC today with type and screen incase transfusion needed, iron studies given hx of bleeding  - Has history of gastric bypass so will check  nutritional labs  - Will see pt in 2 weeks to review results; will touch base sooner if labs indicate urgent need for intervention    True Larry, PGY- V  Hematology/Oncology Fellow

## 2022-02-03 ENCOUNTER — PATIENT MESSAGE (OUTPATIENT)
Dept: HEMATOLOGY/ONCOLOGY | Facility: CLINIC | Age: 52
End: 2022-02-03
Payer: MEDICAID

## 2022-02-07 LAB
COPPER SERPL-MCNC: 1018 UG/L (ref 810–1990)
ZINC SERPL-MCNC: 74 UG/DL (ref 60–130)

## 2022-02-08 LAB — METHYLMALONATE SERPL-SCNC: 0.18 UMOL/L

## 2022-02-17 ENCOUNTER — OFFICE VISIT (OUTPATIENT)
Dept: HEMATOLOGY/ONCOLOGY | Facility: CLINIC | Age: 52
End: 2022-02-17
Payer: MEDICAID

## 2022-02-17 ENCOUNTER — INFUSION (OUTPATIENT)
Dept: INFUSION THERAPY | Facility: OTHER | Age: 52
End: 2022-02-17
Attending: STUDENT IN AN ORGANIZED HEALTH CARE EDUCATION/TRAINING PROGRAM
Payer: MEDICAID

## 2022-02-17 VITALS
TEMPERATURE: 99 F | WEIGHT: 263.88 LBS | RESPIRATION RATE: 17 BRPM | OXYGEN SATURATION: 96 % | BODY MASS INDEX: 39.08 KG/M2 | DIASTOLIC BLOOD PRESSURE: 73 MMHG | HEART RATE: 78 BPM | HEIGHT: 69 IN | SYSTOLIC BLOOD PRESSURE: 119 MMHG

## 2022-02-17 DIAGNOSIS — D50.0 IRON DEFICIENCY ANEMIA DUE TO CHRONIC BLOOD LOSS: Primary | ICD-10-CM

## 2022-02-17 DIAGNOSIS — M06.9 RHEUMATOID ARTHRITIS, INVOLVING UNSPECIFIED SITE, UNSPECIFIED WHETHER RHEUMATOID FACTOR PRESENT: ICD-10-CM

## 2022-02-17 DIAGNOSIS — Z98.890 STATUS POST GASTRIC SURGERY: ICD-10-CM

## 2022-02-17 DIAGNOSIS — Z87.19 HISTORY OF GASTROINTESTINAL BLEEDING: ICD-10-CM

## 2022-02-17 DIAGNOSIS — E53.8 B12 DEFICIENCY: ICD-10-CM

## 2022-02-17 PROCEDURE — 3008F PR BODY MASS INDEX (BMI) DOCUMENTED: ICD-10-PCS | Mod: CPTII,,, | Performed by: STUDENT IN AN ORGANIZED HEALTH CARE EDUCATION/TRAINING PROGRAM

## 2022-02-17 PROCEDURE — 63600175 PHARM REV CODE 636 W HCPCS: Performed by: STUDENT IN AN ORGANIZED HEALTH CARE EDUCATION/TRAINING PROGRAM

## 2022-02-17 PROCEDURE — 3074F PR MOST RECENT SYSTOLIC BLOOD PRESSURE < 130 MM HG: ICD-10-PCS | Mod: CPTII,,, | Performed by: STUDENT IN AN ORGANIZED HEALTH CARE EDUCATION/TRAINING PROGRAM

## 2022-02-17 PROCEDURE — 3074F SYST BP LT 130 MM HG: CPT | Mod: CPTII,,, | Performed by: STUDENT IN AN ORGANIZED HEALTH CARE EDUCATION/TRAINING PROGRAM

## 2022-02-17 PROCEDURE — 3078F DIAST BP <80 MM HG: CPT | Mod: CPTII,,, | Performed by: STUDENT IN AN ORGANIZED HEALTH CARE EDUCATION/TRAINING PROGRAM

## 2022-02-17 PROCEDURE — 99999 PR PBB SHADOW E&M-EST. PATIENT-LVL III: CPT | Mod: PBBFAC,,, | Performed by: STUDENT IN AN ORGANIZED HEALTH CARE EDUCATION/TRAINING PROGRAM

## 2022-02-17 PROCEDURE — 1160F PR REVIEW ALL MEDS BY PRESCRIBER/CLIN PHARMACIST DOCUMENTED: ICD-10-PCS | Mod: CPTII,,, | Performed by: STUDENT IN AN ORGANIZED HEALTH CARE EDUCATION/TRAINING PROGRAM

## 2022-02-17 PROCEDURE — 25000003 PHARM REV CODE 250: Performed by: STUDENT IN AN ORGANIZED HEALTH CARE EDUCATION/TRAINING PROGRAM

## 2022-02-17 PROCEDURE — 3078F PR MOST RECENT DIASTOLIC BLOOD PRESSURE < 80 MM HG: ICD-10-PCS | Mod: CPTII,,, | Performed by: STUDENT IN AN ORGANIZED HEALTH CARE EDUCATION/TRAINING PROGRAM

## 2022-02-17 PROCEDURE — 96372 THER/PROPH/DIAG INJ SC/IM: CPT | Mod: 59

## 2022-02-17 PROCEDURE — 99214 OFFICE O/P EST MOD 30 MIN: CPT | Mod: S$PBB,,, | Performed by: STUDENT IN AN ORGANIZED HEALTH CARE EDUCATION/TRAINING PROGRAM

## 2022-02-17 PROCEDURE — 1160F RVW MEDS BY RX/DR IN RCRD: CPT | Mod: CPTII,,, | Performed by: STUDENT IN AN ORGANIZED HEALTH CARE EDUCATION/TRAINING PROGRAM

## 2022-02-17 PROCEDURE — 96365 THER/PROPH/DIAG IV INF INIT: CPT

## 2022-02-17 PROCEDURE — 99213 OFFICE O/P EST LOW 20 MIN: CPT | Mod: PBBFAC,25 | Performed by: STUDENT IN AN ORGANIZED HEALTH CARE EDUCATION/TRAINING PROGRAM

## 2022-02-17 PROCEDURE — 3008F BODY MASS INDEX DOCD: CPT | Mod: CPTII,,, | Performed by: STUDENT IN AN ORGANIZED HEALTH CARE EDUCATION/TRAINING PROGRAM

## 2022-02-17 PROCEDURE — 1159F MED LIST DOCD IN RCRD: CPT | Mod: CPTII,,, | Performed by: STUDENT IN AN ORGANIZED HEALTH CARE EDUCATION/TRAINING PROGRAM

## 2022-02-17 PROCEDURE — 1159F PR MEDICATION LIST DOCUMENTED IN MEDICAL RECORD: ICD-10-PCS | Mod: CPTII,,, | Performed by: STUDENT IN AN ORGANIZED HEALTH CARE EDUCATION/TRAINING PROGRAM

## 2022-02-17 PROCEDURE — 99999 PR PBB SHADOW E&M-EST. PATIENT-LVL III: ICD-10-PCS | Mod: PBBFAC,,, | Performed by: STUDENT IN AN ORGANIZED HEALTH CARE EDUCATION/TRAINING PROGRAM

## 2022-02-17 PROCEDURE — 99214 PR OFFICE/OUTPT VISIT, EST, LEVL IV, 30-39 MIN: ICD-10-PCS | Mod: S$PBB,,, | Performed by: STUDENT IN AN ORGANIZED HEALTH CARE EDUCATION/TRAINING PROGRAM

## 2022-02-17 RX ORDER — SODIUM CHLORIDE 0.9 % (FLUSH) 0.9 %
10 SYRINGE (ML) INJECTION
Status: CANCELLED | OUTPATIENT
Start: 2022-02-24

## 2022-02-17 RX ORDER — SYRINGE W-NEEDLE,DISPOSAB,3 ML 25GX5/8"
SYRINGE, EMPTY DISPOSABLE MISCELLANEOUS
Qty: 16 EACH | Refills: 1 | Status: SHIPPED | OUTPATIENT
Start: 2022-02-17 | End: 2022-07-22 | Stop reason: SDUPTHER

## 2022-02-17 RX ORDER — CYANOCOBALAMIN 1000 UG/ML
INJECTION, SOLUTION INTRAMUSCULAR; SUBCUTANEOUS
Qty: 10 ML | Refills: 1 | Status: SHIPPED | OUTPATIENT
Start: 2022-02-17 | End: 2022-07-22 | Stop reason: SDUPTHER

## 2022-02-17 RX ORDER — SODIUM CHLORIDE 9 MG/ML
INJECTION, SOLUTION INTRAVENOUS CONTINUOUS
Status: CANCELLED | OUTPATIENT
Start: 2022-02-24

## 2022-02-17 RX ORDER — CYANOCOBALAMIN 1000 UG/ML
1000 INJECTION, SOLUTION INTRAMUSCULAR; SUBCUTANEOUS
Status: CANCELLED
Start: 2022-02-24

## 2022-02-17 RX ORDER — SODIUM CHLORIDE 9 MG/ML
INJECTION, SOLUTION INTRAVENOUS CONTINUOUS
Status: DISCONTINUED | OUTPATIENT
Start: 2022-02-17 | End: 2022-02-17 | Stop reason: HOSPADM

## 2022-02-17 RX ORDER — SODIUM CHLORIDE 0.9 % (FLUSH) 0.9 %
10 SYRINGE (ML) INJECTION
Status: DISCONTINUED | OUTPATIENT
Start: 2022-02-17 | End: 2022-02-17 | Stop reason: HOSPADM

## 2022-02-17 RX ORDER — CYANOCOBALAMIN 1000 UG/ML
1000 INJECTION, SOLUTION INTRAMUSCULAR; SUBCUTANEOUS
Status: COMPLETED | OUTPATIENT
Start: 2022-02-17 | End: 2022-02-17

## 2022-02-17 RX ORDER — HEPARIN 100 UNIT/ML
5 SYRINGE INTRAVENOUS
Status: DISCONTINUED | OUTPATIENT
Start: 2022-02-17 | End: 2022-02-17 | Stop reason: HOSPADM

## 2022-02-17 RX ORDER — HEPARIN 100 UNIT/ML
5 SYRINGE INTRAVENOUS
Status: CANCELLED | OUTPATIENT
Start: 2022-02-24

## 2022-02-17 RX ADMIN — SODIUM CHLORIDE: 0.9 INJECTION, SOLUTION INTRAVENOUS at 03:02

## 2022-02-17 RX ADMIN — CYANOCOBALAMIN 1000 MCG: 1000 INJECTION INTRAMUSCULAR; SUBCUTANEOUS at 03:02

## 2022-02-17 RX ADMIN — FERRIC CARBOXYMALTOSE INJECTION 750 MG: 50 INJECTION, SOLUTION INTRAVENOUS at 03:02

## 2022-02-17 NOTE — PROGRESS NOTES
Hematology- Oncology Clinic Note :      2022    RFV / chief complaint- Follow-up and Anemia, unspecified type (Mouth ulcers)        HPI  Pt is a 52 y.o. female who  has a past medical history of Anxiety, Rheumatoid arthritis, and Thyroid disease.   Pt presents to the clinic today for anemia     Pt also has a history of partial hysterectomy due to uterine cancer and gastric bypass. Was hospitalized in 2021 with GI bleed with EGD and CTA abd/pelvis negative for active site of bleed. Was transfused 2 units of pRBC and discharged as bleeding had stabilized. Denies tobacco use but does have 3-4 glasses of wine per week.     Today, she C/o fatigue which stable  No blood in stool or melena.       Reviewed past medical/surgical/social history    Past Medical History:   Diagnosis Date    Anxiety     Rheumatoid arthritis     Thyroid disease       Past Surgical History:   Procedure Laterality Date     SECTION      GASTRIC BYPASS      HYSTERECTOMY      KNEE CARTILAGE SURGERY Bilateral     x2 to Right, x1 to Left      Review of patient's allergies indicates:  No Known Allergies   Social History     Tobacco Use    Smoking status: Never Smoker    Smokeless tobacco: Never Used   Substance Use Topics    Alcohol use: Yes     Comment: occasionally      Family History   Problem Relation Age of Onset    Breast cancer Maternal Grandmother     Colon cancer Neg Hx     Ovarian cancer Neg Hx           Review of Systems :  Review of Systems   Constitutional: Positive for malaise/fatigue. Negative for chills, diaphoresis, fever and weight loss.   HENT: Negative.  Negative for congestion, hearing loss, nosebleeds, sore throat and tinnitus.    Eyes: Negative.  Negative for blurred vision and discharge.   Respiratory: Negative for cough, hemoptysis, sputum production, shortness of breath and wheezing.    Cardiovascular: Negative.  Negative for chest pain, palpitations and leg swelling.   Gastrointestinal:  "Negative.  Negative for abdominal pain, blood in stool, constipation, diarrhea, heartburn, melena, nausea and vomiting.   Genitourinary: Negative.    Musculoskeletal: Negative.  Negative for back pain, falls, joint pain and myalgias.   Skin: Negative.  Negative for itching and rash.   Neurological: Negative.  Negative for dizziness, tingling, sensory change, speech change, focal weakness, seizures, loss of consciousness, weakness and headaches.   Endo/Heme/Allergies: Negative.  Does not bruise/bleed easily.   Psychiatric/Behavioral: Negative.  Negative for depression. The patient is not nervous/anxious and does not have insomnia.                Physical Exam :  /73 (BP Location: Left arm, Patient Position: Sitting, BP Method: Large (Automatic))   Pulse 78   Temp 98.7 °F (37.1 °C) (Oral)   Resp 17   Ht 5' 8.5" (1.74 m)   Wt 119.7 kg (263 lb 14.3 oz)   SpO2 96%   BMI 39.54 kg/m²   Wt Readings from Last 3 Encounters:   02/17/22 119.7 kg (263 lb 14.3 oz)   02/02/22 116.5 kg (256 lb 13.4 oz)   11/29/21 119.9 kg (264 lb 5.3 oz)       Body mass index is 39.54 kg/m².      Physical Exam  Vitals and nursing note reviewed.   Constitutional:       General: She is not in acute distress.     Appearance: Normal appearance. She is not ill-appearing.   HENT:      Head: Normocephalic and atraumatic.      Right Ear: External ear normal.      Left Ear: External ear normal.      Mouth/Throat:      Pharynx: No oropharyngeal exudate.   Eyes:      General: No scleral icterus.        Right eye: No discharge.         Left eye: No discharge.   Cardiovascular:      Rate and Rhythm: Normal rate.   Pulmonary:      Effort: Pulmonary effort is normal. No respiratory distress.   Abdominal:      General: There is no distension.   Musculoskeletal:         General: No swelling or deformity.      Cervical back: Normal range of motion and neck supple.      Right lower leg: No edema.      Left lower leg: No edema.   Skin:     Coloration: " "Skin is not jaundiced.      Findings: No bruising, erythema, lesion or rash.   Neurological:      General: No focal deficit present.      Mental Status: She is alert and oriented to person, place, and time. Mental status is at baseline.      Coordination: Coordination normal.      Gait: Gait normal.   Psychiatric:         Mood and Affect: Mood normal.         Behavior: Behavior normal.             Current Outpatient Medications   Medication Sig Dispense Refill    buPROPion (WELLBUTRIN SR) 200 MG SR12 Take 200 mg by mouth.      DULoxetine (CYMBALTA) 60 MG capsule Take 60 mg by mouth once daily.      ergocalciferol (ERGOCALCIFEROL) 50,000 unit Cap       ferrous sulfate (FEOSOL) 325 mg (65 mg iron) Tab tablet Take 325 mg by mouth.      levothyroxine (SYNTHROID) 50 MCG tablet Take 50 mcg by mouth once daily.      zolpidem (AMBIEN) 10 mg Tab       cyanocobalamin 1,000 mcg/mL injection Weekly x 4 doses and then every 4 weeks 10 mL 1    syringe with needle 3 mL 22 x 1 1/2" Syrg Use to inject B12 shots intramuscularly as directed. 16 each 1     No current facility-administered medications for this visit.       Pertinent Diagnostic studies:      No visits with results within 1 Week(s) from this visit.   Latest known visit with results is:   Lab Visit on 02/02/2022   Component Date Value Ref Range Status    WBC 02/02/2022 7.94  3.90 - 12.70 K/uL Final    RBC 02/02/2022 4.71  4.00 - 5.40 M/uL Final    Hemoglobin 02/02/2022 12.8  12.0 - 16.0 g/dL Final    Hematocrit 02/02/2022 40.8  37.0 - 48.5 % Final    MCV 02/02/2022 87  82 - 98 fL Final    MCH 02/02/2022 27.2  27.0 - 31.0 pg Final    MCHC 02/02/2022 31.4* 32.0 - 36.0 g/dL Final    RDW 02/02/2022 17.5* 11.5 - 14.5 % Final    Platelets 02/02/2022 332  150 - 450 K/uL Final    MPV 02/02/2022 9.4  9.2 - 12.9 fL Final    Immature Granulocytes 02/02/2022 0.4  0.0 - 0.5 % Final    Gran # (ANC) 02/02/2022 4.6  1.8 - 7.7 K/uL Final    Immature Grans (Abs) " 02/02/2022 0.03  0.00 - 0.04 K/uL Final    Comment: Mild elevation in immature granulocytes is non specific and   can be seen in a variety of conditions including stress response,   acute inflammation, trauma and pregnancy. Correlation with other   laboratory and clinical findings is essential.      Lymph # 02/02/2022 2.4  1.0 - 4.8 K/uL Final    Mono # 02/02/2022 0.7  0.3 - 1.0 K/uL Final    Eos # 02/02/2022 0.1  0.0 - 0.5 K/uL Final    Baso # 02/02/2022 0.09  0.00 - 0.20 K/uL Final    nRBC 02/02/2022 0  0 /100 WBC Final    Gran % 02/02/2022 58.2  38.0 - 73.0 % Final    Lymph % 02/02/2022 30.7  18.0 - 48.0 % Final    Mono % 02/02/2022 8.6  4.0 - 15.0 % Final    Eosinophil % 02/02/2022 1.0  0.0 - 8.0 % Final    Basophil % 02/02/2022 1.1  0.0 - 1.9 % Final    Differential Method 02/02/2022 Automated   Final    Sodium 02/02/2022 139  136 - 145 mmol/L Final    Potassium 02/02/2022 4.1  3.5 - 5.1 mmol/L Final    Chloride 02/02/2022 104  95 - 110 mmol/L Final    CO2 02/02/2022 23  23 - 29 mmol/L Final    Glucose 02/02/2022 117* 70 - 110 mg/dL Final    BUN 02/02/2022 11  6 - 20 mg/dL Final    Creatinine 02/02/2022 0.7  0.5 - 1.4 mg/dL Final    Calcium 02/02/2022 9.5  8.7 - 10.5 mg/dL Final    Total Protein 02/02/2022 7.8  6.0 - 8.4 g/dL Final    Albumin 02/02/2022 4.1  3.5 - 5.2 g/dL Final    Total Bilirubin 02/02/2022 0.4  0.1 - 1.0 mg/dL Final    Comment: For infants and newborns, interpretation of results should be based  on gestational age, weight and in agreement with clinical  observations.    Premature Infant recommended reference ranges:  Up to 24 hours.............<8.0 mg/dL  Up to 48 hours............<12.0 mg/dL  3-5 days..................<15.0 mg/dL  6-29 days.................<15.0 mg/dL      Alkaline Phosphatase 02/02/2022 102  55 - 135 U/L Final    AST 02/02/2022 25  10 - 40 U/L Final    ALT 02/02/2022 18  10 - 44 U/L Final    Anion Gap 02/02/2022 12  8 - 16 mmol/L Final    eGFR if   02/02/2022 >60  >60 mL/min/1.73 m^2 Final    eGFR if non African American 02/02/2022 >60  >60 mL/min/1.73 m^2 Final    Comment: Calculation used to obtain the estimated glomerular filtration  rate (eGFR) is the CKD-EPI equation.       Iron 02/02/2022 161* 30 - 160 ug/dL Final    Transferrin 02/02/2022 518* 200 - 375 mg/dL Final    TIBC 02/02/2022 767* 250 - 450 ug/dL Final    Saturated Iron 02/02/2022 21  20 - 50 % Final    Ferritin 02/02/2022 12* 20.0 - 300.0 ng/mL Final    Vitamin B-12 02/02/2022 226  210 - 950 pg/mL Final    Methlymalonic Acid 02/02/2022 0.18  <0.40 umol/L Final    Comment: If applicable, any drug confirmation testing reported  here was developed and the performance characteristics  determined by [facility]. This   confirmation testing has not been cleared or approved  by the FDA. The laboratory is regulated under CLIA as  qualified to perform high-complexity testing. This test  is used for patient testing purposes. It should not be  regarded as investigational or for research.    Test performed at [facility],  [address] , [place]  [address]     [phone]  Ramon Kumar MD  - Medical Director      Folate 02/02/2022 17.3  4.0 - 24.0 ng/mL Final    Sed Rate 02/02/2022 11  0 - 20 mm/Hr Final    LD 02/02/2022 200  110 - 260 U/L Final    Results are increased in hemolyzed samples.    Retic 02/02/2022 1.2  0.5 - 2.5 % Final    Group & Rh 02/02/2022 A POS   Final    Indirect Paulie 02/02/2022 NEG   Final    Copper 02/02/2022 1018  810 - 1990 ug/L Final    Comment: Copper values may be elevated to twice the normal   levels in pregnancy.      Elevated results may be due to sample collected in a   non-certified trace element-free tube.     This test was developed and the performance   characteristics determined by [facility].   It has not been cleared or approved by the FDA.   The laboratory is regulated under CLIA as  qualified to   perform high-complexity testing. This test is used for   patient testing purposes. It should not be regarded   as investigational or for research.    Test performed at [facility],  [address] , [place]  [address]     [phone]  Ramon Kumar MD  - Medical Director      Zinc 02/02/2022 74  60 - 130 ug/dL Final    Comment: Elevated results may be due to sample collected in a   non-certified trace element-free tube.     This test was developed and the performance   characteristics determined by [facility].   It has not been cleared or approved by the FDA.   The laboratory is regulated under CLIA as qualified to   perform high-complexity testing. This test is used for   patient testing purposes. It should not be regarded   as investigational or for research.    Test performed at [facility],  [address] , [place]  [address]     [phone]  Ramon Kumar MD  - Medical Director             Oncology History    No history exists.     Assessment :       1. Iron deficiency anemia due to chronic blood loss    2. Status post gastric surgery    3. B12 deficiency    4. History of gastrointestinal bleeding    5. Rheumatoid arthritis, involving unspecified site, unspecified whether rheumatoid factor present        Plan :       Iron def anemia due to chronic blood loss . Hx of Gi bleeding req transfusion. Hx of gastric bypass   CTA abd/pelvis and EGD did not reveal source, will need colonoscopy and possibly may require capsule endoscopy  Labs reviewed with pt. Hb wnl. Iron def.   Will give one dose of injectafer and B12 shot on 2/17/22   Pt will continue to f/u with gi Dr. Osuna at [facility]  Continue iron rich diet and bariatric multivitamin   Parenteral b12 weekly x 4 doses followed by monthly shot for life    Discussed side effects of Injectafer (or venofer) which include but are not limited to infusion reaction, shortness of breath,  hives, rash, flushing, itching, headaches, skin discoloration at the injection site, nausea, vomiting, low phosphorus level, elevated blood pressure, elevated liver enzymes, risks to the unborn baby if pregnant. Patient understands the risks and agrees with proceeding with Injectafer (or venofer).           Electronically signed by Jerri Garsia    [facility]      Future Appointments   Date Time Provider Department Center   3/7/2022 11:00 AM Felipe Sarabia MD [facility] LUANA Azul           This note was created with voice recognition software.  Grammatical, syntax and spelling errors may be inevitable.

## 2022-02-17 NOTE — PLAN OF CARE
Injectafer infusion administered, no reaction. Patient tolerated well. 24 gauge IV removed, catheter intact. No apparent distress noted. Discharge instructions given to patient. Patient understands instructions.

## 2022-02-17 NOTE — Clinical Note
One dose of IV iron  injectafer only  RN visit for B12 education  RTC 3 months with labs cbc, iron , ferritin 2 days prior

## 2022-03-07 ENCOUNTER — TELEPHONE (OUTPATIENT)
Dept: PRIMARY CARE CLINIC | Facility: CLINIC | Age: 52
End: 2022-03-07
Payer: MEDICAID

## 2022-05-28 ENCOUNTER — PATIENT MESSAGE (OUTPATIENT)
Dept: PRIMARY CARE CLINIC | Facility: CLINIC | Age: 52
End: 2022-05-28
Payer: MEDICAID

## 2022-06-14 PROBLEM — Z98.890 STATUS POST GASTRIC SURGERY: Status: ACTIVE | Noted: 2022-06-14

## 2022-06-14 PROBLEM — E53.8 B12 DEFICIENCY: Status: ACTIVE | Noted: 2022-06-14

## 2022-07-11 ENCOUNTER — PATIENT MESSAGE (OUTPATIENT)
Dept: ADMINISTRATIVE | Facility: HOSPITAL | Age: 52
End: 2022-07-11
Payer: MEDICAID

## 2022-07-12 ENCOUNTER — TELEPHONE (OUTPATIENT)
Dept: PRIMARY CARE CLINIC | Facility: CLINIC | Age: 52
End: 2022-07-12
Payer: MEDICAID

## 2022-07-12 NOTE — TELEPHONE ENCOUNTER
----- Message from Krysta Mohan sent at 7/12/2022 11:55 AM CDT -----  Contact: [phone]  Pts  is calling to get the pts mammo order please advise and give return call

## 2022-07-12 NOTE — TELEPHONE ENCOUNTER
Called pt regarding mammogram order. Pt requested  picks up a copy of order from office. Informed pt the order is ready for , pt verbalized understanding.

## 2022-07-13 ENCOUNTER — TELEPHONE (OUTPATIENT)
Dept: HEMATOLOGY/ONCOLOGY | Facility: CLINIC | Age: 52
End: 2022-07-13
Payer: MEDICAID

## 2022-07-13 NOTE — TELEPHONE ENCOUNTER
"Called and spoke with Ms. Monique regarding her appointment. Setup for Monday. No other questions asked.    ----- Message from Gill Rasmussen sent at 7/13/2022  3:37 PM CDT -----  Regarding: Same Day appt  Consult/Advisory:           Name Of Caller: Matt-    Contact Preference?: [phone]    Does patient feel the need to be seen today? If possible or within the next few days    What is the nature of the call?: pt has had several nose bleeds and would like to be seen as soon as possible      Additional Notes:  "Thank you for all that you do for our patients'"       "

## 2022-07-14 ENCOUNTER — TELEPHONE (OUTPATIENT)
Dept: HEMATOLOGY/ONCOLOGY | Facility: CLINIC | Age: 52
End: 2022-07-14
Payer: MEDICAID

## 2022-07-14 ENCOUNTER — TELEPHONE (OUTPATIENT)
Dept: PRIMARY CARE CLINIC | Facility: CLINIC | Age: 52
End: 2022-07-14
Payer: MEDICAID

## 2022-07-14 ENCOUNTER — OFFICE VISIT (OUTPATIENT)
Dept: PODIATRY | Facility: CLINIC | Age: 52
End: 2022-07-14
Payer: MEDICAID

## 2022-07-14 VITALS
SYSTOLIC BLOOD PRESSURE: 126 MMHG | HEIGHT: 68 IN | BODY MASS INDEX: 39.08 KG/M2 | WEIGHT: 257.88 LBS | HEART RATE: 86 BPM | DIASTOLIC BLOOD PRESSURE: 75 MMHG

## 2022-07-14 DIAGNOSIS — S93.401S INVERSION SPRAIN OF ANKLE, RIGHT, SEQUELA: ICD-10-CM

## 2022-07-14 DIAGNOSIS — M25.571 PAIN IN JOINT INVOLVING RIGHT ANKLE AND FOOT: ICD-10-CM

## 2022-07-14 DIAGNOSIS — M72.2 PLANTAR FASCIITIS OF RIGHT FOOT: Primary | ICD-10-CM

## 2022-07-14 DIAGNOSIS — M71.371 OTHER BURSAL CYST, RIGHT ANKLE AND FOOT: ICD-10-CM
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
## 2022-07-14 PROCEDURE — 20550 PR INJECT TENDON SHEATH/LIGAMENT: ICD-10-PCS | Mod: S$PBB,RT,, | Performed by: PODIATRIST

## 2022-07-14 PROCEDURE — 99203 OFFICE O/P NEW LOW 30 MIN: CPT | Mod: 25,S$PBB,, | Performed by: PODIATRIST

## 2022-07-14 PROCEDURE — 3078F PR MOST RECENT DIASTOLIC BLOOD PRESSURE < 80 MM HG: ICD-10-PCS | Mod: CPTII,,, | Performed by: PODIATRIST

## 2022-07-14 PROCEDURE — 99999 PR PBB SHADOW E&M-EST. PATIENT-LVL III: CPT | Mod: PBBFAC,,, | Performed by: PODIATRIST

## 2022-07-14 PROCEDURE — 99203 PR OFFICE/OUTPT VISIT, NEW, LEVL III, 30-44 MIN: ICD-10-PCS | Mod: 25,S$PBB,, | Performed by: PODIATRIST

## 2022-07-14 PROCEDURE — 20550 NJX 1 TENDON SHEATH/LIGAMENT: CPT | Mod: PBBFAC,PN | Performed by: PODIATRIST

## 2022-07-14 PROCEDURE — 99999 PR PBB SHADOW E&M-EST. PATIENT-LVL III: ICD-10-PCS | Mod: PBBFAC,,, | Performed by: PODIATRIST

## 2022-07-14 PROCEDURE — 3008F BODY MASS INDEX DOCD: CPT | Mod: CPTII,,, | Performed by: PODIATRIST

## 2022-07-14 PROCEDURE — 3074F SYST BP LT 130 MM HG: CPT | Mod: CPTII,,, | Performed by: PODIATRIST

## 2022-07-14 PROCEDURE — 3078F DIAST BP <80 MM HG: CPT | Mod: CPTII,,, | Performed by: PODIATRIST

## 2022-07-14 PROCEDURE — 20550 NJX 1 TENDON SHEATH/LIGAMENT: CPT | Mod: S$PBB,RT,, | Performed by: PODIATRIST

## 2022-07-14 PROCEDURE — 3074F PR MOST RECENT SYSTOLIC BLOOD PRESSURE < 130 MM HG: ICD-10-PCS | Mod: CPTII,,, | Performed by: PODIATRIST

## 2022-07-14 PROCEDURE — 99213 OFFICE O/P EST LOW 20 MIN: CPT | Mod: PBBFAC,PN,25 | Performed by: PODIATRIST

## 2022-07-14 PROCEDURE — 3008F PR BODY MASS INDEX (BMI) DOCUMENTED: ICD-10-PCS | Mod: CPTII,,, | Performed by: PODIATRIST

## 2022-07-14 RX ORDER — DICLOFENAC SODIUM 10 MG/G
2 GEL TOPICAL 4 TIMES DAILY
Qty: 350 G | Refills: 2 | Status: SHIPPED | OUTPATIENT
Start: 2022-07-14 | End: 2023-07-31 | Stop reason: SDUPTHER

## 2022-07-14 RX ADMIN — DEXAMETHASONE SODIUM PHOSPHATE 4 MG: 4 INJECTION, SOLUTION INTRA-ARTICULAR; INTRALESIONAL; INTRAMUSCULAR; INTRAVENOUS; SOFT TISSUE at 03:07

## 2022-07-14 RX ADMIN — METHYLPREDNISOLONE ACETATE 40 MG: 40 INJECTION, SUSPENSION INTRA-ARTICULAR; INTRALESIONAL; INTRAMUSCULAR; SOFT TISSUE at 03:07

## 2022-07-14 NOTE — TELEPHONE ENCOUNTER
Called pt regarding hospital follow up. Scheduled pt on 7/15/22 with , pt verbalized understanding

## 2022-07-14 NOTE — PATIENT INSTRUCTIONS
PPT arch pads w/ adhesive - medium    Spenco arch orthotics - 3/4 length    Visco-gel universal metatarsal strap - Large/Xlarge right

## 2022-07-14 NOTE — PROCEDURES
Injection right heel    Date/Time: 7/14/2022 3:00 PM  Performed by: Anjelica Nelson DPM  Authorized by: Anjelica Nelson DPM     Consent Done?:  Yes (Verbal)  Indications:  Pain  Local anesthesia used?: Yes    Anesthesia:  Local infiltration  Local anesthetic:  Topical anesthetic, bupivacaine 0.5% without epinephrine and lidocaine 2% without epinephrine  Anesthetic total (ml):  1    Location:  Foot  Ultrasonic guidance for needle placement?: No    Needle size:  25 G  Approach:  Medial  Medications:  4 mg dexamethasone 4 mg/mL; 40 mg methylPREDNISolone acetate 40 mg/mL  Patient tolerance:  Patient tolerated the procedure well with no immediate complications

## 2022-07-14 NOTE — TELEPHONE ENCOUNTER
----- Message from Scott Guzman sent at 7/14/2022 11:52 AM CDT -----  Type:  Same Day Appointment Request    Caller is requesting a same day appointment.  Caller declined first available appointment listed below.    Name of Caller: Gisela  When is the first available appointment? 9-  Symptoms: fever  Best Call Back Number: [phone]  Additional Information: follow up from ED visit on 7-

## 2022-07-14 NOTE — TELEPHONE ENCOUNTER
Called Ms. Monique and notify her that Dr. Garsia does want labs prior to her visit. No other questions asked at this time.     ----- Message from Jerri Garsia MD sent at 7/14/2022  8:45 AM CDT -----  Regarding: RE: bld work?  Yes. thanks  ----- Message -----  From: Jolie Parr RN  Sent: 7/13/2022   4:07 PM CDT  To: Jerri Garsia MD  Subject: bld work?                                        Do you want bld work on her.. she had several episodes of bleeding from the nose.  She had labs done in may and was to follow up with you with the results.. however, she either did not show or cancelled the appointments.

## 2022-07-14 NOTE — PROGRESS NOTES
Subjective:      Patient ID: Gisela Monique is a 52 y.o. female.    Chief Complaint: Heel Pain (Of right foot /)    Gisela is a 52 y.o. female who presents to the clinic, accompanied by her 71 y/o ,  complaining of heel pain R, especially first step in the morning or after sitting - eases after an hour; also, @ EOD, if standing too long. The pain is described as stabbing/piercing pain, sometimes w/ a shock to center of heel. Now lasting 2 wks.or longer vs 2 days, & getting progressively worse. The onset of the pain was gradual and has significantly worsened over the past several months. Gisela rates the pain as 10/10. She denies a history of trauma. Prior treatments include soaking & cold wrap.  Ankle R has rolled yrs.ago - 4 to 5 years ago - since, has on & off swollen area that feels like a gel. Sometimes feels like it gives way. Also, stepped in a hole about 15 yrs.ago.    Being worked up for chronic bruising w/ hem/onc. Anemic - has had blood & Fe infusion.  Past Medical History:   Diagnosis Date    Anxiety     Rheumatoid arthritis     Thyroid disease      Patient Active Problem List   Diagnosis    PTSD (post-traumatic stress disorder)    Bipolar 1 disorder    Neck pain    Iron deficiency anemia    B12 deficiency    Status post gastric surgery     PCP: Felipe Sarabia MD  DOLV: 11/29/21     Objective:      Review of Systems   Constitutional: Negative for malaise/fatigue.   Cardiovascular: Negative for claudication and leg swelling.   Skin: Negative for color change, dry skin, itching, nail changes, rash and suspicious lesions.   Musculoskeletal: Positive for arthritis, joint pain, joint swelling and myalgias. Negative for falls, muscle cramps and muscle weakness.   Psychiatric/Behavioral: The patient is not nervous/anxious.      Physical Exam  Vitals reviewed.   Constitutional:       General: She is not in acute distress.     Appearance: She is well-developed. She is morbidly obese.    Cardiovascular:      Pulses: Normal pulses.           Dorsalis pedis pulses are 2+ on the right side.   Musculoskeletal:         General: Swelling, tenderness and signs of injury (remote) present.      Right lower leg: No edema.      Left lower leg: No edema.      Right ankle: Swelling present. No deformity or ecchymosis. No tenderness. Normal range of motion. Anterior drawer test negative.      Right foot: Normal range of motion. Swelling, tenderness and bony tenderness present.        Feet:    Skin:     General: Skin is warm and dry.      Capillary Refill: Capillary refill takes less than 2 seconds.      Findings: No bruising, erythema or rash.   Neurological:      Mental Status: She is alert and oriented to person, place, and time.      Sensory: No sensory deficit.      Motor: No weakness.      Gait: Gait normal.   Psychiatric:         Mood and Affect: Mood and affect normal.         Behavior: Behavior normal. Behavior is cooperative.         Assessment:      Encounter Diagnoses   Name Primary?    Plantar fasciitis of right foot Yes    Inversion sprain of ankle, right, sequela     Other bursal cyst, right ankle and foot     Slipping, tripping and stumbling without falling due to stepping into hole or opening, sequela        Problem List Items Addressed This Visit    None     Visit Diagnoses     Plantar fasciitis of right foot    -  Primary    Relevant Orders    Injection right heel    Inversion sprain of ankle, right, sequela        Other bursal cyst, right ankle and foot        Slipping, tripping and stumbling without falling due to stepping into hole or opening, sequela            Plan:       Gisela was seen today for heel pain.    Diagnoses and all orders for this visit:    Plantar fasciitis of right foot  -     Injection right heel    Inversion sprain of ankle, right, sequela    Other bursal cyst, right ankle and foot    Slipping, tripping and stumbling without falling due to stepping into hole or  opening, sequela    Other orders  -     diclofenac sodium (VOLTAREN) 1 % Gel; Apply 2 g topically 4 (four) times daily.    I counseled the patient on her conditions, their implications and medical management.    Discussed general issues surrounding plantar fasciitis along with the advantages and disadvantages of various tx strategies.    - Shoe inspection. Patient instructed on proper supportive shoe gear, never walking without protective shoe gear.    PPT arch pad added to R shoe.     Dispensed gelstrap for MLA in shoes w/out arch pads.    To purchase arch orthotics OTC.    FU with me in 4 weeks, sooner prn.

## 2022-07-18 ENCOUNTER — OFFICE VISIT (OUTPATIENT)
Dept: HEMATOLOGY/ONCOLOGY | Facility: CLINIC | Age: 52
End: 2022-07-18
Payer: MEDICAID

## 2022-07-18 VITALS
SYSTOLIC BLOOD PRESSURE: 175 MMHG | HEART RATE: 91 BPM | TEMPERATURE: 98 F | BODY MASS INDEX: 39.96 KG/M2 | RESPIRATION RATE: 20 BRPM | DIASTOLIC BLOOD PRESSURE: 83 MMHG | OXYGEN SATURATION: 100 % | WEIGHT: 262.81 LBS

## 2022-07-18 DIAGNOSIS — E53.8 B12 DEFICIENCY: ICD-10-CM

## 2022-07-18 DIAGNOSIS — M79.89 LEFT LEG SWELLING: ICD-10-CM

## 2022-07-18 DIAGNOSIS — J20.8 ACUTE BRONCHITIS DUE TO OTHER SPECIFIED ORGANISMS: ICD-10-CM

## 2022-07-18 DIAGNOSIS — M06.9 RHEUMATOID ARTHRITIS, INVOLVING UNSPECIFIED SITE, UNSPECIFIED WHETHER RHEUMATOID FACTOR PRESENT: ICD-10-CM

## 2022-07-18 DIAGNOSIS — Z87.19 HISTORY OF GASTROINTESTINAL BLEEDING: ICD-10-CM

## 2022-07-18 DIAGNOSIS — Z98.890 STATUS POST GASTRIC SURGERY: ICD-10-CM

## 2022-07-18 DIAGNOSIS — R23.3 EASY BRUISABILITY: ICD-10-CM

## 2022-07-18 DIAGNOSIS — D50.0 IRON DEFICIENCY ANEMIA DUE TO CHRONIC BLOOD LOSS: Primary | ICD-10-CM

## 2022-07-18 PROCEDURE — 99999 PR PBB SHADOW E&M-EST. PATIENT-LVL III: ICD-10-PCS | Mod: PBBFAC,,, | Performed by: STUDENT IN AN ORGANIZED HEALTH CARE EDUCATION/TRAINING PROGRAM

## 2022-07-18 PROCEDURE — 1159F MED LIST DOCD IN RCRD: CPT | Mod: CPTII,,, | Performed by: STUDENT IN AN ORGANIZED HEALTH CARE EDUCATION/TRAINING PROGRAM

## 2022-07-18 PROCEDURE — 3077F SYST BP >= 140 MM HG: CPT | Mod: CPTII,,, | Performed by: STUDENT IN AN ORGANIZED HEALTH CARE EDUCATION/TRAINING PROGRAM

## 2022-07-18 PROCEDURE — 99213 OFFICE O/P EST LOW 20 MIN: CPT | Mod: PBBFAC | Performed by: STUDENT IN AN ORGANIZED HEALTH CARE EDUCATION/TRAINING PROGRAM

## 2022-07-18 PROCEDURE — 3077F PR MOST RECENT SYSTOLIC BLOOD PRESSURE >= 140 MM HG: ICD-10-PCS | Mod: CPTII,,, | Performed by: STUDENT IN AN ORGANIZED HEALTH CARE EDUCATION/TRAINING PROGRAM

## 2022-07-18 PROCEDURE — 99215 OFFICE O/P EST HI 40 MIN: CPT | Mod: S$PBB,,, | Performed by: STUDENT IN AN ORGANIZED HEALTH CARE EDUCATION/TRAINING PROGRAM

## 2022-07-18 PROCEDURE — 3079F DIAST BP 80-89 MM HG: CPT | Mod: CPTII,,, | Performed by: STUDENT IN AN ORGANIZED HEALTH CARE EDUCATION/TRAINING PROGRAM

## 2022-07-18 PROCEDURE — 3008F PR BODY MASS INDEX (BMI) DOCUMENTED: ICD-10-PCS | Mod: CPTII,,, | Performed by: STUDENT IN AN ORGANIZED HEALTH CARE EDUCATION/TRAINING PROGRAM

## 2022-07-18 PROCEDURE — 99215 PR OFFICE/OUTPT VISIT, EST, LEVL V, 40-54 MIN: ICD-10-PCS | Mod: S$PBB,,, | Performed by: STUDENT IN AN ORGANIZED HEALTH CARE EDUCATION/TRAINING PROGRAM

## 2022-07-18 PROCEDURE — 3008F BODY MASS INDEX DOCD: CPT | Mod: CPTII,,, | Performed by: STUDENT IN AN ORGANIZED HEALTH CARE EDUCATION/TRAINING PROGRAM

## 2022-07-18 PROCEDURE — 1160F RVW MEDS BY RX/DR IN RCRD: CPT | Mod: CPTII,,, | Performed by: STUDENT IN AN ORGANIZED HEALTH CARE EDUCATION/TRAINING PROGRAM

## 2022-07-18 PROCEDURE — 1160F PR REVIEW ALL MEDS BY PRESCRIBER/CLIN PHARMACIST DOCUMENTED: ICD-10-PCS | Mod: CPTII,,, | Performed by: STUDENT IN AN ORGANIZED HEALTH CARE EDUCATION/TRAINING PROGRAM

## 2022-07-18 PROCEDURE — 99999 PR PBB SHADOW E&M-EST. PATIENT-LVL III: CPT | Mod: PBBFAC,,, | Performed by: STUDENT IN AN ORGANIZED HEALTH CARE EDUCATION/TRAINING PROGRAM

## 2022-07-18 PROCEDURE — 3079F PR MOST RECENT DIASTOLIC BLOOD PRESSURE 80-89 MM HG: ICD-10-PCS | Mod: CPTII,,, | Performed by: STUDENT IN AN ORGANIZED HEALTH CARE EDUCATION/TRAINING PROGRAM

## 2022-07-18 PROCEDURE — 1159F PR MEDICATION LIST DOCUMENTED IN MEDICAL RECORD: ICD-10-PCS | Mod: CPTII,,, | Performed by: STUDENT IN AN ORGANIZED HEALTH CARE EDUCATION/TRAINING PROGRAM

## 2022-07-18 RX ORDER — AZITHROMYCIN 250 MG/1
TABLET, FILM COATED ORAL
Qty: 6 TABLET | Refills: 0 | Status: SHIPPED | OUTPATIENT
Start: 2022-07-18 | End: 2022-08-16 | Stop reason: ALTCHOICE

## 2022-07-18 RX ORDER — MULTIVIT-MIN/IRON/FOLIC ACID/K 45-800-120
1 CAPSULE ORAL DAILY
Qty: 30 CAPSULE | Refills: 11 | Status: SHIPPED | OUTPATIENT
Start: 2022-07-18 | End: 2022-07-22 | Stop reason: SDUPTHER

## 2022-07-18 NOTE — PROGRESS NOTES
Hematology- Oncology Clinic Note :      2022    RFV / chief complaint- Anemia        HPI  Pt is a 52 y.o. female who  has a past medical history of Anxiety, Rheumatoid arthritis, and Thyroid disease.   Pt presents to the clinic today for anemia     Pt has a history of partial hysterectomy due to uterine cancer and gastric bypass. Was hospitalized in 2021 with GI bleed with EGD and CTA abd/pelvis negative for active site of bleed. Was transfused 2 units of pRBC and discharged as bleeding had stabilized. Denies tobacco use but does have 3-4 glasses of wine per week.     Patient reports to be not doing well.  She complains of fever, T max was 100.3 F, cough congestion.  She denies any chest pain but has shortness of breath on exertion intermittently.  Cough is productive with dark yellow greenish sputum.  She went to the Er twice in last 1 month and oral steroids did not help.   L leg is swollen.  Also mentions easy bruising.        Reviewed past medical/surgical/social history    Past Medical History:   Diagnosis Date    Anxiety     Rheumatoid arthritis     Thyroid disease       Past Surgical History:   Procedure Laterality Date     SECTION      GASTRIC BYPASS      HYSTERECTOMY      KNEE CARTILAGE SURGERY Bilateral     x2 to Right, x1 to Left      Review of patient's allergies indicates:  No Known Allergies   Social History     Tobacco Use    Smoking status: Never Smoker    Smokeless tobacco: Never Used   Substance Use Topics    Alcohol use: Yes     Comment: occasionally      Family History   Problem Relation Age of Onset    Breast cancer Maternal Grandmother     Colon cancer Neg Hx     Ovarian cancer Neg Hx           Review of Systems :  Review of Systems   Constitutional: Positive for malaise/fatigue. Negative for chills, diaphoresis, fever and weight loss.   HENT: Positive for congestion and nosebleeds. Negative for hearing loss, sore throat and tinnitus.    Eyes: Negative.   Negative for blurred vision and discharge.   Respiratory: Positive for cough, sputum production and shortness of breath. Negative for hemoptysis and wheezing.    Cardiovascular: Negative.  Negative for chest pain, palpitations and leg swelling.   Gastrointestinal: Negative.  Negative for abdominal pain, blood in stool, constipation, diarrhea, heartburn, melena, nausea and vomiting.   Genitourinary: Negative.    Musculoskeletal: Negative.  Negative for back pain, falls, joint pain and myalgias.   Skin: Negative.  Negative for itching and rash.   Neurological: Negative.  Negative for dizziness, tingling, sensory change, speech change, focal weakness, seizures, loss of consciousness, weakness and headaches.   Endo/Heme/Allergies: Bruises/bleeds easily.   Psychiatric/Behavioral: Negative.  Negative for depression. The patient is not nervous/anxious and does not have insomnia.                Physical Exam :  BP (!) 175/83 (BP Location: Left arm, Patient Position: Sitting)   Pulse 91   Temp 98.2 °F (36.8 °C) (Oral)   Resp 20   Wt 119.2 kg (262 lb 12.6 oz)   SpO2 100%   BMI 39.96 kg/m²   Wt Readings from Last 3 Encounters:   07/18/22 119.2 kg (262 lb 12.6 oz)   07/14/22 117 kg (257 lb 14.4 oz)   07/11/22 113.4 kg (250 lb)       Body mass index is 39.96 kg/m².      Physical Exam  Vitals and nursing note reviewed.   Constitutional:       General: She is not in acute distress.     Appearance: Normal appearance. She is not ill-appearing.   HENT:      Head: Normocephalic and atraumatic.      Right Ear: External ear normal.      Left Ear: External ear normal.      Mouth/Throat:      Pharynx: No oropharyngeal exudate.   Eyes:      General: No scleral icterus.        Right eye: No discharge.         Left eye: No discharge.   Cardiovascular:      Rate and Rhythm: Normal rate.   Pulmonary:      Effort: Pulmonary effort is normal. No respiratory distress.   Abdominal:      General: There is no distension.   Musculoskeletal:     "     General: No swelling or deformity.      Cervical back: Normal range of motion and neck supple.      Right lower leg: No edema.      Left lower leg: No edema.   Skin:     Coloration: Skin is not jaundiced.      Findings: No bruising, erythema, lesion or rash.   Neurological:      General: No focal deficit present.      Mental Status: She is alert and oriented to person, place, and time. Mental status is at baseline.      Coordination: Coordination normal.      Gait: Gait normal.   Psychiatric:         Mood and Affect: Mood normal.         Behavior: Behavior normal.             Current Outpatient Medications   Medication Sig Dispense Refill    buPROPion (WELLBUTRIN SR) 200 MG SR12 Take 200 mg by mouth.      butalbital-acetaminophen-caffeine -40 mg (FIORICET, ESGIC) -40 mg per tablet Take 1 tablet by mouth every 6 (six) hours as needed for Headaches. Label with sedative precautions 20 tablet 0    cyanocobalamin 1,000 mcg/mL injection Weekly x 4 doses and then every 4 weeks 10 mL 1    diclofenac sodium (VOLTAREN) 1 % Gel Apply 2 g topically 4 (four) times daily. 350 g 2    DULoxetine (CYMBALTA) 60 MG capsule Take 60 mg by mouth once daily.      ergocalciferol (ERGOCALCIFEROL) 50,000 unit Cap       ferrous sulfate (FEOSOL) 325 mg (65 mg iron) Tab tablet Take 325 mg by mouth.      levothyroxine (SYNTHROID) 50 MCG tablet Take 50 mcg by mouth once daily.      methylPREDNISolone (MEDROL DOSEPACK) 4 mg tablet TAD 1 each 0    syringe with needle 3 mL 22 x 1 1/2" Syrg Use to inject B12 shots intramuscularly as directed. 16 each 1    zolpidem (AMBIEN) 10 mg Tab        No current facility-administered medications for this visit.       Pertinent Diagnostic studies:      Lab Visit on 07/14/2022   Component Date Value Ref Range Status    WBC 07/14/2022 9.75  3.90 - 12.70 K/uL Final    RBC 07/14/2022 3.91 (A) 4.00 - 5.40 M/uL Final    Hemoglobin 07/14/2022 13.3  12.0 - 16.0 g/dL Final    Hematocrit " 07/14/2022 40.6  37.0 - 48.5 % Final    MCV 07/14/2022 104 (A) 82 - 98 fL Final    MCH 07/14/2022 34.0 (A) 27.0 - 31.0 pg Final    MCHC 07/14/2022 32.8  32.0 - 36.0 g/dL Final    RDW 07/14/2022 13.6  11.5 - 14.5 % Final    Platelets 07/14/2022 368  150 - 450 K/uL Final    MPV 07/14/2022 10.1  9.2 - 12.9 fL Final    Immature Granulocytes 07/14/2022 0.2  0.0 - 0.5 % Final    Gran # (ANC) 07/14/2022 7.4  1.8 - 7.7 K/uL Final    Immature Grans (Abs) 07/14/2022 0.02  0.00 - 0.04 K/uL Final    Comment: Mild elevation in immature granulocytes is non specific and   can be seen in a variety of conditions including stress response,   acute inflammation, trauma and pregnancy. Correlation with other   laboratory and clinical findings is essential.      Lymph # 07/14/2022 1.6  1.0 - 4.8 K/uL Final    Mono # 07/14/2022 0.7  0.3 - 1.0 K/uL Final    Eos # 07/14/2022 0.1  0.0 - 0.5 K/uL Final    Baso # 07/14/2022 0.06  0.00 - 0.20 K/uL Final    nRBC 07/14/2022 0  0 /100 WBC Final    Gran % 07/14/2022 75.8 (A) 38.0 - 73.0 % Final    Lymph % 07/14/2022 16.1 (A) 18.0 - 48.0 % Final    Mono % 07/14/2022 6.7  4.0 - 15.0 % Final    Eosinophil % 07/14/2022 0.6  0.0 - 8.0 % Final    Basophil % 07/14/2022 0.6  0.0 - 1.9 % Final    Differential Method 07/14/2022 Automated   Final    Ferritin 07/14/2022 38  20.0 - 300.0 ng/mL Final    Iron 07/14/2022 98  30 - 160 ug/dL Final    Transferrin 07/14/2022 332  200 - 375 mg/dL Final    TIBC 07/14/2022 491 (A) 250 - 450 ug/dL Final    Saturated Iron 07/14/2022 20  20 - 50 % Final           Oncology History    No history exists.     Assessment :       1. Iron deficiency anemia due to chronic blood loss    2. History of gastrointestinal bleeding    3. B12 deficiency    4. Status post gastric surgery    5. Rheumatoid arthritis, involving unspecified site, unspecified whether rheumatoid factor present        Plan :         Acute bronchitis-Z-Chuy prescribed.  Encouraged to  follow-up with primary care.  Leg swelling-ultrasound ordered to rule out DVT  Multiple questions and concerns were addressed during the    Iron def anemia due to chronic blood loss . Hx of Gi bleeding req transfusion. Hx of gastric bypass   CTA abd/pelvis and EGD did not reveal source, will need colonoscopy and possibly may require capsule endoscopy  one dose of injectafer given on 2/17/22 .Labs reviewed with pt. Hb wnl. Iron def.  Avoid IV iron in active infection.  Pt will continue to f/u with gi Dr[facility]  Continue iron rich diet and bariatric multivitamin   Parenteral b12 monthly shot for life    Discussed side effects of Injectafer (or venofer) which include but are not limited to infusion reaction, shortness of breath, hives, rash, flushing, itching, headaches, skin discoloration at the injection site, nausea, vomiting, low phosphorus level, elevated blood pressure, elevated liver enzymes, risks to the unborn baby if pregnant. Patient understands the risks and agrees with proceeding with Injectafer (or venofer).     CC primary care      Electronically signed by Jerri Garsia    [facility]      Future Appointments   Date Time Provider Department Center   7/19/2022 10:40 AM ERVIN Perrin [facility] OBGYN [facility]   8/2/2022  1:45 PM [facility] US1 [facility] ULSND . [facility]   8/15/2022  1:30 PM Anjelica Nelson DPM [facility] POD [facility]   8/15/2022  2:00 PM LAB, [facility] LAB [facility]   8/16/2022 10:30 AM Jerri Garsia MD [facility] HEM ONC [facility]   8/16/2022 11:30 AM [facility] MAMMO2 BX [facility] MAMMO [facility]   8/30/2022 10:30 AM Felipe Sarabia MD [facility]       I spent >40 mins on reviewing epic chart notes, reviewing tests, nursing concerns,obtaining history, performing physical exam, counseling and educating patient/family/caregiver, documentation, independently interpreting results and discussing them with patient/family/caregiver, care coordination,  ordering medications/ tests/ procedures and referring and communicating with other health care professionals.       This note was created with voice recognition software.  Grammatical, syntax and spelling errors may be inevitable.

## 2022-07-19 ENCOUNTER — OFFICE VISIT (OUTPATIENT)
Dept: OBSTETRICS AND GYNECOLOGY | Facility: CLINIC | Age: 52
End: 2022-07-19
Payer: MEDICAID

## 2022-07-19 VITALS
HEIGHT: 68 IN | DIASTOLIC BLOOD PRESSURE: 86 MMHG | BODY MASS INDEX: 39.62 KG/M2 | SYSTOLIC BLOOD PRESSURE: 130 MMHG | WEIGHT: 261.44 LBS

## 2022-07-19 DIAGNOSIS — Z01.419 ENCOUNTER FOR WELL WOMAN EXAM WITH ROUTINE GYNECOLOGICAL EXAM: Primary | ICD-10-CM

## 2022-07-19 DIAGNOSIS — R10.2 PELVIC PAIN: ICD-10-CM

## 2022-07-19 DIAGNOSIS — N95.1 PERIMENOPAUSAL: ICD-10-CM

## 2022-07-19 DIAGNOSIS — N64.52 NIPPLE DISCHARGE: ICD-10-CM

## 2022-07-19 PROCEDURE — 99396 PREV VISIT EST AGE 40-64: CPT | Mod: S$PBB,,, | Performed by: NURSE PRACTITIONER

## 2022-07-19 PROCEDURE — 3008F BODY MASS INDEX DOCD: CPT | Mod: CPTII,,, | Performed by: NURSE PRACTITIONER

## 2022-07-19 PROCEDURE — 3008F PR BODY MASS INDEX (BMI) DOCUMENTED: ICD-10-PCS | Mod: CPTII,,, | Performed by: NURSE PRACTITIONER

## 2022-07-19 PROCEDURE — 3075F PR MOST RECENT SYSTOLIC BLOOD PRESS GE 130-139MM HG: ICD-10-PCS | Mod: CPTII,,, | Performed by: NURSE PRACTITIONER

## 2022-07-19 PROCEDURE — 3079F DIAST BP 80-89 MM HG: CPT | Mod: CPTII,,, | Performed by: NURSE PRACTITIONER

## 2022-07-19 PROCEDURE — 99999 PR PBB SHADOW E&M-EST. PATIENT-LVL III: CPT | Mod: PBBFAC,,, | Performed by: NURSE PRACTITIONER

## 2022-07-19 PROCEDURE — 99396 PR PREVENTIVE VISIT,EST,40-64: ICD-10-PCS | Mod: S$PBB,,, | Performed by: NURSE PRACTITIONER

## 2022-07-19 PROCEDURE — 87624 HPV HI-RISK TYP POOLED RSLT: CPT | Performed by: NURSE PRACTITIONER

## 2022-07-19 PROCEDURE — 3044F HG A1C LEVEL LT 7.0%: CPT | Mod: CPTII,,, | Performed by: NURSE PRACTITIONER

## 2022-07-19 PROCEDURE — 3075F SYST BP GE 130 - 139MM HG: CPT | Mod: CPTII,,, | Performed by: NURSE PRACTITIONER

## 2022-07-19 PROCEDURE — 99999 PR PBB SHADOW E&M-EST. PATIENT-LVL III: ICD-10-PCS | Mod: PBBFAC,,, | Performed by: NURSE PRACTITIONER

## 2022-07-19 PROCEDURE — 3079F PR MOST RECENT DIASTOLIC BLOOD PRESSURE 80-89 MM HG: ICD-10-PCS | Mod: CPTII,,, | Performed by: NURSE PRACTITIONER

## 2022-07-19 PROCEDURE — 3044F PR MOST RECENT HEMOGLOBIN A1C LEVEL <7.0%: ICD-10-PCS | Mod: CPTII,,, | Performed by: NURSE PRACTITIONER

## 2022-07-19 PROCEDURE — 99213 OFFICE O/P EST LOW 20 MIN: CPT | Mod: PBBFAC,PN | Performed by: NURSE PRACTITIONER

## 2022-07-19 PROCEDURE — 88175 CYTOPATH C/V AUTO FLUID REDO: CPT | Performed by: NURSE PRACTITIONER

## 2022-07-19 RX ORDER — METHYLPREDNISOLONE ACETATE 40 MG/ML
40 INJECTION, SUSPENSION INTRA-ARTICULAR; INTRALESIONAL; INTRAMUSCULAR; SOFT TISSUE
Status: DISCONTINUED | OUTPATIENT
Start: 2022-07-14 | End: 2022-07-19 | Stop reason: HOSPADM

## 2022-07-19 RX ORDER — DEXAMETHASONE SODIUM PHOSPHATE 4 MG/ML
4 INJECTION, SOLUTION INTRA-ARTICULAR; INTRALESIONAL; INTRAMUSCULAR; INTRAVENOUS; SOFT TISSUE
Status: DISCONTINUED | OUTPATIENT
Start: 2022-07-14 | End: 2022-07-19 | Stop reason: HOSPADM

## 2022-07-19 NOTE — MEDICAL/APP STUDENT
Subjective:       Patient ID: Gisela Monique is a 52 y.o. female with hypothyroidism on levothyroxine presenting for annual exam. Had partial hysterectomy 23 years ago.Reports increased fatigue and lack of sex drive and believes her thyroid may be the problem. Pt concerned she may be perimenopausal due to hot flashes, vaginal dryness, and fatigue. Reports dyspareunia. Has BLE edema and has US scheduled for 08/02/22 to R/O DVT. Reports episode of vaginal bleeding x 1 day last month not associated with sexual intercourse. Reports intermittent bilateral milky breast discharge that is reproducible at times. Denies breast lumps, skin changes, pain. Has appt to follow up with PCP at the end of September. Denies vaginal discharge, foul odors. Reports intermittent urinary leakage, but denies dysuria, hematuria, frequency. Had colonoscopy last year r/t rectal bleeding and bone density scan in 2019 indicating osteopenia. Pt currently taking ergocalciferol and bariatric vitamin. Has mammogram scheduled in August 2022.     Chief Complaint: Well Woman    HPI  Review of Systems     Family History   Problem Relation Age of Onset    Breast cancer Maternal Grandmother     Colon cancer Neg Hx     Ovarian cancer Neg Hx            Objective:      Physical Exam    Assessment:       Problem List Items Addressed This Visit    None     Visit Diagnoses     Encounter for well woman exam with routine gynecological exam    -  Primary    Relevant Orders    COMPREHENSIVE METABOLIC PANEL    TSH    T4, FREE    Lipid Panel    Hemoglobin A1C    Liquid-Based Pap Smear, Screening    HPV High Risk Genotypes, PCR    Nipple discharge        Relevant Orders    Prolactin    Perimenopausal        Relevant Orders    Follicle Stimulating Hormone    Estradiol    Pelvic pain        Relevant Orders    US Pelvis Comp with Transvag NON-OB (xpd          Plan:

## 2022-07-19 NOTE — PROGRESS NOTES
"Chief Complaint: Well Woman Exam     HPI:      Gisela Monique is a 52 y.o.  who presents today for well woman exam.  Patient has had a hysterectomy at 24 yo. Patient is currently sexually active with a single male partner.She declines STD screening today. Ms. Monique confirms that she is safe at home.  Ms. Monique denies abnormal vaginal, discharge, pelvic pain, urinary problems, or changes in appetite.    Reports milky discharge from bilateral nipples  Reports vaginal irritation and vaginal bleeding x 1 day last month.  Reports decreased libido and dyspareunia. She was previously prescribed diazepam 10 mg vaginal suppository and premarin with reported relief.     Previous Pap:  NILM/ HPV neg (2020) abnormal pap smear 4 years ago, did not get follow-up, history of cryotherapy in the past  Previous Mammogram: BiRads: 1  (2019)   Most Recent Dexa: - osteopenia- takes ergocalciferol 50,000 twice monthly.   Colonoscopy: - per pt. Under care of GI/ CRS    Patient is requesting routine wellness labs.    Family History   Problem Relation Age of Onset    Breast cancer Maternal Grandmother     Colon cancer Neg Hx     Ovarian cancer Neg Hx      OB History        3    Para   3    Term                AB        Living           SAB        IAB        Ectopic        Multiple        Live Births                     ROS:     GENERAL: Denies unintentional weight gain or weight loss. Feeling well overall.   BREASTS: Denies pain, lumps, or nipple discharge.   ABDOMEN: Denies abdominal pain, constipation, diarrhea, nausea, vomiting, change in appetite.  URINARY: Denies frequency, dysuria, hematuria.  PSYCHIATRIC: Denies depression, anxiety or mood swings.    Physical Exam:      PHYSICAL EXAM:  /86   Ht 5' 8" (1.727 m)   Wt 118.6 kg (261 lb 7.5 oz)   BMI 39.76 kg/m²   Body mass index is 39.76 kg/m².     APPEARANCE: Well nourished, well developed, in no acute distress.  PSYCH: Appropriate mood " and affect.  ABDOMEN: Soft.  No tenderness or masses.    BREASTS: Symmetrical, no skin changes or visible lesions.  No palpable masses or nipple discharge bilaterally.  PELVIC: Normal external genitalia without lesions.  Normal hair distribution.  Adequate perineal body, normal urethral meatus.  Vagina atrophic without lesions or discharge.  Cervix and uterus absent.  No significant cystocele or rectocele.  Bimanual exam shows  Adnexa without masses or tenderness.    Bimanual limited due to habitus.     Assessment/Plan:     Encounter for well woman exam with routine gynecological exam  -     COMPREHENSIVE METABOLIC PANEL; Future; Expected date: 07/19/2022  -     TSH; Future; Expected date: 07/19/2022  -     T4, FREE; Future; Expected date: 07/19/2022  -     Lipid Panel; Future; Expected date: 07/19/2022  -     Hemoglobin A1C; Future; Expected date: 07/19/2022  -     Cancel: Liquid-Based Pap Smear, Screening  -     Cancel: HPV High Risk Genotypes, PCR    Nipple discharge  -     Prolactin; Future; Expected date: 07/19/2022    Perimenopausal  -     Follicle Stimulating Hormone; Future; Expected date: 07/19/2022  -     Estradiol; Future; Expected date: 07/19/2022    Pelvic pain  -     US Pelvis Comp with Transvag NON-OB (xpd; Future; Expected date: 07/19/2022    Other orders  -     Pap Smear, Thin Prep  -     HPV Mrna with Reflex to Genotypes        Counseling:     Patient was counseled today on current ASCCP pap guidelines, the recommendation for yearly pelvic exams, healthy diet and exercise routines, breast self awareness and annual mammograms.She is to see her PCP for other health maintenance.       Estradiol vaginal cream.  F/U with Gala if pain continues with estrace.

## 2022-07-22 ENCOUNTER — PATIENT MESSAGE (OUTPATIENT)
Dept: HEMATOLOGY/ONCOLOGY | Facility: CLINIC | Age: 52
End: 2022-07-22
Payer: MEDICAID

## 2022-07-22 DIAGNOSIS — E53.8 B12 DEFICIENCY: ICD-10-CM

## 2022-07-22 DIAGNOSIS — Z98.890 STATUS POST GASTRIC SURGERY: ICD-10-CM

## 2022-07-22 DIAGNOSIS — R10.2 VAGINAL PAIN: Primary | ICD-10-CM

## 2022-07-22 DIAGNOSIS — N94.10 DYSPAREUNIA, FEMALE: ICD-10-CM

## 2022-07-22 DIAGNOSIS — N89.8 VAGINAL DRYNESS: ICD-10-CM

## 2022-07-22 RX ORDER — MULTIVIT-MIN/IRON/FOLIC ACID/K 45-800-120
1 CAPSULE ORAL DAILY
Qty: 30 CAPSULE | Refills: 11 | Status: SHIPPED | OUTPATIENT
Start: 2022-07-22

## 2022-07-22 RX ORDER — CYANOCOBALAMIN 1000 UG/ML
INJECTION, SOLUTION INTRAMUSCULAR; SUBCUTANEOUS
Qty: 10 ML | Refills: 1 | Status: CANCELLED | OUTPATIENT
Start: 2022-07-22

## 2022-07-22 RX ORDER — SYRINGE W-NEEDLE,DISPOSAB,3 ML 25GX5/8"
SYRINGE, EMPTY DISPOSABLE MISCELLANEOUS
Qty: 16 EACH | Refills: 1 | Status: SHIPPED | OUTPATIENT
Start: 2022-07-22 | End: 2022-12-09 | Stop reason: SDUPTHER

## 2022-07-22 RX ORDER — ESTRADIOL 0.1 MG/G
CREAM VAGINAL
Qty: 42.5 G | Refills: 2 | Status: SHIPPED | OUTPATIENT
Start: 2022-07-22 | End: 2022-12-13

## 2022-07-22 RX ORDER — CYANOCOBALAMIN 1000 UG/ML
INJECTION, SOLUTION INTRAMUSCULAR; SUBCUTANEOUS
Qty: 10 ML | Refills: 1 | Status: SHIPPED | OUTPATIENT
Start: 2022-07-22 | End: 2022-12-09 | Stop reason: SDUPTHER

## 2022-07-26 LAB
CLINICAL INFO: NORMAL
CYTO CVX: NORMAL
CYTOLOGIST CVX/VAG CYTO: NORMAL
CYTOLOGIST CVX/VAG CYTO: NORMAL
CYTOLOGY CMNT CVX/VAG CYTO-IMP: NORMAL
CYTOLOGY PAP THIN PREP EXPLANATION: NORMAL
DATE OF PREVIOUS PAP: YES
DATE PREVIOUS BX: YES
GEN CATEG CVX/VAG CYTO-IMP: NORMAL
HPV E6+E7 MRNA CVX QL NAA+PROBE: NOT DETECTED
LMP START DATE: NORMAL
MICROORGANISM CVX/VAG CYTO: NORMAL
PATHOLOGIST CVX/VAG CYTO: NORMAL
SERVICE CMNT-IMP: NORMAL
SPECIMEN SOURCE CVX/VAG CYTO: NORMAL
STAT OF ADQ CVX/VAG CYTO-IMP: NORMAL

## 2022-07-28 ENCOUNTER — TELEPHONE (OUTPATIENT)
Dept: OBSTETRICS AND GYNECOLOGY | Facility: CLINIC | Age: 52
End: 2022-07-28
Payer: MEDICAID

## 2022-07-28 NOTE — TELEPHONE ENCOUNTER
----- Message from ERVIN Perrin sent at 7/28/2022 11:25 AM CDT -----  Unsatisfactory. Repeat in 2-4 months.

## 2022-08-16 ENCOUNTER — HOSPITAL ENCOUNTER (OUTPATIENT)
Dept: RADIOLOGY | Facility: OTHER | Age: 52
Discharge: HOME | End: 2022-08-16
Attending: FAMILY MEDICINE
Payer: MEDICAID

## 2022-08-16 ENCOUNTER — OFFICE VISIT (OUTPATIENT)
Dept: HEMATOLOGY/ONCOLOGY | Facility: CLINIC | Age: 52
End: 2022-08-16
Payer: MEDICAID

## 2022-08-16 VITALS
SYSTOLIC BLOOD PRESSURE: 168 MMHG | BODY MASS INDEX: 38.56 KG/M2 | DIASTOLIC BLOOD PRESSURE: 85 MMHG | HEART RATE: 98 BPM | TEMPERATURE: 99 F | HEIGHT: 68 IN | RESPIRATION RATE: 16 BRPM | WEIGHT: 254.44 LBS | OXYGEN SATURATION: 96 %

## 2022-08-16 DIAGNOSIS — E53.8 B12 DEFICIENCY: ICD-10-CM

## 2022-08-16 DIAGNOSIS — Z12.31 BREAST CANCER SCREENING BY MAMMOGRAM: ICD-10-CM

## 2022-08-16 DIAGNOSIS — D50.0 IRON DEFICIENCY ANEMIA DUE TO CHRONIC BLOOD LOSS: Primary | ICD-10-CM

## 2022-08-16 DIAGNOSIS — Z98.890 STATUS POST GASTRIC SURGERY: ICD-10-CM

## 2022-08-16 DIAGNOSIS — D53.9 NUTRITIONAL ANEMIA: ICD-10-CM

## 2022-08-16 PROCEDURE — 3008F BODY MASS INDEX DOCD: CPT | Mod: CPTII,,, | Performed by: STUDENT IN AN ORGANIZED HEALTH CARE EDUCATION/TRAINING PROGRAM

## 2022-08-16 PROCEDURE — 99213 PR OFFICE/OUTPT VISIT, EST, LEVL III, 20-29 MIN: ICD-10-PCS | Mod: S$PBB,,, | Performed by: STUDENT IN AN ORGANIZED HEALTH CARE EDUCATION/TRAINING PROGRAM

## 2022-08-16 PROCEDURE — 77063 BREAST TOMOSYNTHESIS BI: CPT | Mod: TC

## 2022-08-16 PROCEDURE — 77067 SCR MAMMO BI INCL CAD: CPT | Mod: 26,,, | Performed by: RADIOLOGY

## 2022-08-16 PROCEDURE — 77067 MAMMO DIGITAL SCREENING BILAT WITH TOMO: ICD-10-PCS | Mod: 26,,, | Performed by: RADIOLOGY

## 2022-08-16 PROCEDURE — 99999 PR PBB SHADOW E&M-EST. PATIENT-LVL III: CPT | Mod: PBBFAC,,, | Performed by: STUDENT IN AN ORGANIZED HEALTH CARE EDUCATION/TRAINING PROGRAM

## 2022-08-16 PROCEDURE — 3077F PR MOST RECENT SYSTOLIC BLOOD PRESSURE >= 140 MM HG: ICD-10-PCS | Mod: CPTII,,, | Performed by: STUDENT IN AN ORGANIZED HEALTH CARE EDUCATION/TRAINING PROGRAM

## 2022-08-16 PROCEDURE — 77067 SCR MAMMO BI INCL CAD: CPT | Mod: TC

## 2022-08-16 PROCEDURE — 1159F PR MEDICATION LIST DOCUMENTED IN MEDICAL RECORD: ICD-10-PCS | Mod: CPTII,,, | Performed by: STUDENT IN AN ORGANIZED HEALTH CARE EDUCATION/TRAINING PROGRAM

## 2022-08-16 PROCEDURE — 3079F PR MOST RECENT DIASTOLIC BLOOD PRESSURE 80-89 MM HG: ICD-10-PCS | Mod: CPTII,,, | Performed by: STUDENT IN AN ORGANIZED HEALTH CARE EDUCATION/TRAINING PROGRAM

## 2022-08-16 PROCEDURE — 3008F PR BODY MASS INDEX (BMI) DOCUMENTED: ICD-10-PCS | Mod: CPTII,,, | Performed by: STUDENT IN AN ORGANIZED HEALTH CARE EDUCATION/TRAINING PROGRAM

## 2022-08-16 PROCEDURE — 3077F SYST BP >= 140 MM HG: CPT | Mod: CPTII,,, | Performed by: STUDENT IN AN ORGANIZED HEALTH CARE EDUCATION/TRAINING PROGRAM

## 2022-08-16 PROCEDURE — 3079F DIAST BP 80-89 MM HG: CPT | Mod: CPTII,,, | Performed by: STUDENT IN AN ORGANIZED HEALTH CARE EDUCATION/TRAINING PROGRAM

## 2022-08-16 PROCEDURE — 77063 BREAST TOMOSYNTHESIS BI: CPT | Mod: 26,,, | Performed by: RADIOLOGY

## 2022-08-16 PROCEDURE — 99213 OFFICE O/P EST LOW 20 MIN: CPT | Mod: S$PBB,,, | Performed by: STUDENT IN AN ORGANIZED HEALTH CARE EDUCATION/TRAINING PROGRAM

## 2022-08-16 PROCEDURE — 99999 PR PBB SHADOW E&M-EST. PATIENT-LVL III: ICD-10-PCS | Mod: PBBFAC,,, | Performed by: STUDENT IN AN ORGANIZED HEALTH CARE EDUCATION/TRAINING PROGRAM

## 2022-08-16 PROCEDURE — 1160F PR REVIEW ALL MEDS BY PRESCRIBER/CLIN PHARMACIST DOCUMENTED: ICD-10-PCS | Mod: CPTII,,, | Performed by: STUDENT IN AN ORGANIZED HEALTH CARE EDUCATION/TRAINING PROGRAM

## 2022-08-16 PROCEDURE — 99213 OFFICE O/P EST LOW 20 MIN: CPT | Mod: PBBFAC | Performed by: STUDENT IN AN ORGANIZED HEALTH CARE EDUCATION/TRAINING PROGRAM

## 2022-08-16 PROCEDURE — 1159F MED LIST DOCD IN RCRD: CPT | Mod: CPTII,,, | Performed by: STUDENT IN AN ORGANIZED HEALTH CARE EDUCATION/TRAINING PROGRAM

## 2022-08-16 PROCEDURE — 1160F RVW MEDS BY RX/DR IN RCRD: CPT | Mod: CPTII,,, | Performed by: STUDENT IN AN ORGANIZED HEALTH CARE EDUCATION/TRAINING PROGRAM

## 2022-08-16 PROCEDURE — 3044F PR MOST RECENT HEMOGLOBIN A1C LEVEL <7.0%: ICD-10-PCS | Mod: CPTII,,, | Performed by: STUDENT IN AN ORGANIZED HEALTH CARE EDUCATION/TRAINING PROGRAM

## 2022-08-16 PROCEDURE — 3044F HG A1C LEVEL LT 7.0%: CPT | Mod: CPTII,,, | Performed by: STUDENT IN AN ORGANIZED HEALTH CARE EDUCATION/TRAINING PROGRAM

## 2022-08-16 PROCEDURE — 77063 MAMMO DIGITAL SCREENING BILAT WITH TOMO: ICD-10-PCS | Mod: 26,,, | Performed by: RADIOLOGY

## 2022-08-16 NOTE — PROGRESS NOTES
Hematology- Oncology Clinic Note :      2022    RFV / chief complaint- Follow-up and Iron deficiency anemia due to chronic blood loss        HPI  Pt is a 52 y.o. female who  has a past medical history of Anxiety, Rheumatoid arthritis, and Thyroid disease.   Pt presents to the clinic today for anemia     Pt has a history of partial hysterectomy due to uterine cancer and gastric bypass. Was hospitalized in 2021 with GI bleed with EGD and CTA abd/pelvis negative for active site of bleed. Was transfused 2 units of pRBC and discharged as bleeding had stabilized. Denies tobacco use but does have 3-4 glasses of wine per week.             Reviewed past medical/surgical/social history    Past Medical History:   Diagnosis Date    Anxiety     Rheumatoid arthritis     Thyroid disease       Past Surgical History:   Procedure Laterality Date     SECTION      GASTRIC BYPASS      HYSTERECTOMY      KNEE CARTILAGE SURGERY Bilateral     x2 to Right, x1 to Left      Review of patient's allergies indicates:  No Known Allergies   Social History     Tobacco Use    Smoking status: Never Smoker    Smokeless tobacco: Never Used   Substance Use Topics    Alcohol use: Yes     Comment: occasionally      Family History   Problem Relation Age of Onset    Breast cancer Maternal Grandmother     Colon cancer Neg Hx     Ovarian cancer Neg Hx           Review of Systems :  Review of Systems   Constitutional:  Positive for malaise/fatigue. Negative for chills, diaphoresis, fever and weight loss.   HENT: Negative.  Negative for congestion, hearing loss, nosebleeds, sore throat and tinnitus.    Eyes: Negative.  Negative for blurred vision and discharge.   Respiratory:  Negative for cough, hemoptysis, sputum production, shortness of breath and wheezing.    Cardiovascular: Negative.  Negative for chest pain, palpitations and leg swelling.   Gastrointestinal: Negative.  Negative for abdominal pain, blood in stool, constipation,  "diarrhea, heartburn, melena, nausea and vomiting.   Genitourinary: Negative.    Musculoskeletal: Negative.  Negative for back pain, falls, joint pain and myalgias.   Skin: Negative.  Negative for itching and rash.   Neurological: Negative.  Negative for dizziness, tingling, sensory change, speech change, focal weakness, seizures, loss of consciousness, weakness and headaches.   Endo/Heme/Allergies: Negative.  Does not bruise/bleed easily.   Psychiatric/Behavioral: Negative.  Negative for depression. The patient is not nervous/anxious and does not have insomnia.              Physical Exam :  BP (!) 168/85 (BP Location: Left arm, Patient Position: Sitting, BP Method: Large (Automatic))   Pulse 98   Temp 98.8 °F (37.1 °C) (Oral)   Resp 16   Ht 5' 8" (1.727 m)   Wt 115.4 kg (254 lb 6.6 oz)   SpO2 96%   BMI 38.68 kg/m²   Wt Readings from Last 3 Encounters:   08/16/22 115.4 kg (254 lb 6.6 oz)   07/19/22 118.6 kg (261 lb 7.5 oz)   07/18/22 119.2 kg (262 lb 12.6 oz)       Body mass index is 38.68 kg/m².      Physical Exam  Vitals and nursing note reviewed.   Constitutional:       General: She is not in acute distress.     Appearance: Normal appearance. She is not ill-appearing.   HENT:      Head: Normocephalic and atraumatic.      Right Ear: External ear normal.      Left Ear: External ear normal.      Mouth/Throat:      Pharynx: No oropharyngeal exudate.   Eyes:      General: No scleral icterus.        Right eye: No discharge.         Left eye: No discharge.   Cardiovascular:      Rate and Rhythm: Normal rate.   Pulmonary:      Effort: Pulmonary effort is normal. No respiratory distress.   Abdominal:      General: There is no distension.   Musculoskeletal:         General: No swelling or deformity.      Cervical back: Normal range of motion and neck supple.      Right lower leg: No edema.      Left lower leg: No edema.   Skin:     Coloration: Skin is not jaundiced.      Findings: No bruising, erythema, lesion or " "rash.   Neurological:      General: No focal deficit present.      Mental Status: She is alert and oriented to person, place, and time. Mental status is at baseline.      Coordination: Coordination normal.      Gait: Gait normal.   Psychiatric:         Mood and Affect: Mood normal.         Behavior: Behavior normal.           Current Outpatient Medications   Medication Sig Dispense Refill    buPROPion (WELLBUTRIN SR) 200 MG SR12 Take 200 mg by mouth.      butalbital-acetaminophen-caffeine -40 mg (FIORICET, ESGIC) -40 mg per tablet Take 1 tablet by mouth every 6 (six) hours as needed for Headaches. Label with sedative precautions 20 tablet 0    cyanocobalamin 1,000 mcg/mL injection Inject intramuscularly 1 ml Weekly x 4 doses and then every 4 weeks 10 mL 1    diclofenac sodium (VOLTAREN) 1 % Gel Apply 2 g topically 4 (four) times daily. 350 g 2    DULoxetine (CYMBALTA) 60 MG capsule Take 60 mg by mouth once daily.      ergocalciferol (ERGOCALCIFEROL) 50,000 unit Cap       estradioL (ESTRACE) 0.01 % (0.1 mg/gram) vaginal cream Place 1 g vaginally every evening for 7 days, THEN 1 g twice a week. 42.5 g 2    ferrous sulfate (FEOSOL) 325 mg (65 mg iron) Tab tablet Take 325 mg by mouth.      levothyroxine (SYNTHROID) 50 MCG tablet Take 50 mcg by mouth once daily.      multivitamin-min-iron-FA-vit K (BARIATRIC MULTIVITAMINS) 45 mg iron- 800 mcg-120 mcg Cap Take 1 capsule by mouth once daily. 30 capsule 11    syringe with needle 3 mL 22 x 1 1/2" Syrg Use to inject B12 shots intramuscularly as directed. 16 each 1     No current facility-administered medications for this visit.       Pertinent Diagnostic studies:      Lab Visit on 08/15/2022   Component Date Value Ref Range Status    WBC 08/15/2022 7.51  3.90 - 12.70 K/uL Final    RBC 08/15/2022 4.46  4.00 - 5.40 M/uL Final    Hemoglobin 08/15/2022 14.9  12.0 - 16.0 g/dL Final    Hematocrit 08/15/2022 44.8  37.0 - 48.5 % Final    MCV 08/15/2022 100 (A) 82 - 98 fL " Final    MCH 08/15/2022 33.4 (A) 27.0 - 31.0 pg Final    MCHC 08/15/2022 33.3  32.0 - 36.0 g/dL Final    RDW 08/15/2022 12.7  11.5 - 14.5 % Final    Platelets 08/15/2022 323  150 - 450 K/uL Final    MPV 08/15/2022 10.7  9.2 - 12.9 fL Final    Immature Granulocytes 08/15/2022 0.3  0.0 - 0.5 % Final    Gran # (ANC) 08/15/2022 4.2  1.8 - 7.7 K/uL Final    Immature Grans (Abs) 08/15/2022 0.02  0.00 - 0.04 K/uL Final    Comment: Mild elevation in immature granulocytes is non specific and   can be seen in a variety of conditions including stress response,   acute inflammation, trauma and pregnancy. Correlation with other   laboratory and clinical findings is essential.      Lymph # 08/15/2022 2.4  1.0 - 4.8 K/uL Final    Mono # 08/15/2022 0.7  0.3 - 1.0 K/uL Final    Eos # 08/15/2022 0.1  0.0 - 0.5 K/uL Final    Baso # 08/15/2022 0.08  0.00 - 0.20 K/uL Final    nRBC 08/15/2022 0  0 /100 WBC Final    Gran % 08/15/2022 55.8  38.0 - 73.0 % Final    Lymph % 08/15/2022 32.4  18.0 - 48.0 % Final    Mono % 08/15/2022 9.1  4.0 - 15.0 % Final    Eosinophil % 08/15/2022 1.3  0.0 - 8.0 % Final    Basophil % 08/15/2022 1.1  0.0 - 1.9 % Final    Differential Method 08/15/2022 Automated   Final    Ferritin 08/15/2022 21  20.0 - 300.0 ng/mL Final    Prothrombin Time 08/15/2022 10.9  9.0 - 12.5 sec Final    INR 08/15/2022 1.0  0.8 - 1.2 Final    Comment: Coumadin Therapy:  2.0 - 3.0 for INR for all indicators except mechanical heart valves  and antiphospholipid syndromes which should use 2.5 - 3.5.  LOT^040^PT Inn^[id number]             Oncology History    No history exists.     Assessment :       1. Iron deficiency anemia due to chronic blood loss    2. B12 deficiency    3. Status post gastric surgery        Plan :       Iron def anemia due to chronic blood loss . Hx of Gi bleeding req transfusion. Hx of gastric bypass   CTA abd/pelvis and EGD did not reveal source, will need colonoscopy and possibly may require capsule endoscopy  one dose  of injectafer given on 2/17/22 .  Pt will continue to f/u with gi Dr. Osuna at [facility]  Continue iron rich diet and bariatric multivitamin   Parenteral b12 monthly shot for life    Lasb reviewed with pt. Plan for  iv iron   RTC 4 to 6 months with repeat labs     Discussed side effects of Injectafer (or venofer) which include but are not limited to infusion reaction, shortness of breath, hives, rash, flushing, itching, headaches, skin discoloration at the injection site, nausea, vomiting, low phosphorus level, elevated blood pressure, elevated liver enzymes, risks to the unborn baby if pregnant. Patient understands the risks and agrees with proceeding with Injectafer (or venofer).         Electronically signed by [facility]    [facility]      Future Appointments   Date Time Provider Department Center   8/30/2022 10:30 AM Felipe Sarabia MD [facility]   8/31/2022  2:30 PM CHEMO [address] [place] [address] [facility]   9/1/2022  2:45 PM Anjelica Nelson DPM [facility] POD [facility]   11/17/2022 10:40 AM ERVIN Perrin [facility] OBGYN [facility]           This note was created with voice recognition software.  Grammatical, syntax and spelling errors may be inevitable.

## 2022-08-19 ENCOUNTER — TELEPHONE (OUTPATIENT)
Dept: HEMATOLOGY/ONCOLOGY | Facility: CLINIC | Age: 52
End: 2022-08-19
Payer: MEDICAID

## 2022-08-19 NOTE — TELEPHONE ENCOUNTER
Returned call and spoke with  Ms Monique. Ms Monique is calling to check the status of scheduling her Iron infusion appointment. Informed Ms Monique, her iron infusion has been authorized by her insurance provider and her appointment is ready to be scheduled. I will forward a message to our infusion nurse and the nurse will return her call to discuss scheduling  her appointment.

## 2022-08-19 NOTE — TELEPHONE ENCOUNTER
----- Message from Zarina Javed sent at 8/18/2022  3:11 PM CDT -----  Regarding: infusion  Name of Who is Calling:SISSY MOLINA [[id number]]          What is the request in detail: Patient is requesting a call back           Can the clinic reply by [facility]:yes           [facility] Back [facility] [phone]

## 2022-08-21 ENCOUNTER — PATIENT MESSAGE (OUTPATIENT)
Dept: HEMATOLOGY/ONCOLOGY | Facility: CLINIC | Age: 52
End: 2022-08-21
Payer: MEDICAID

## 2022-08-22 ENCOUNTER — PATIENT MESSAGE (OUTPATIENT)
Dept: HEMATOLOGY/ONCOLOGY | Facility: CLINIC | Age: 52
End: 2022-08-22
Payer: MEDICAID

## 2022-08-22 ENCOUNTER — PATIENT MESSAGE (OUTPATIENT)
Dept: PRIMARY CARE CLINIC | Facility: CLINIC | Age: 52
End: 2022-08-22
Payer: MEDICAID

## 2022-08-23 ENCOUNTER — TELEPHONE (OUTPATIENT)
Dept: INFUSION THERAPY | Facility: OTHER | Age: 52
End: 2022-08-23
Payer: MEDICAID

## 2022-08-24 ENCOUNTER — INFUSION (OUTPATIENT)
Dept: INFUSION THERAPY | Facility: OTHER | Age: 52
End: 2022-08-24
Attending: STUDENT IN AN ORGANIZED HEALTH CARE EDUCATION/TRAINING PROGRAM
Payer: MEDICAID

## 2022-08-24 VITALS
TEMPERATURE: 99 F | OXYGEN SATURATION: 99 % | SYSTOLIC BLOOD PRESSURE: 167 MMHG | DIASTOLIC BLOOD PRESSURE: 77 MMHG | RESPIRATION RATE: 16 BRPM | HEART RATE: 92 BPM

## 2022-08-24 DIAGNOSIS — D50.0 IRON DEFICIENCY ANEMIA DUE TO CHRONIC BLOOD LOSS: Primary | ICD-10-CM

## 2022-08-24 PROCEDURE — 63600175 PHARM REV CODE 636 W HCPCS: Mod: JG | Performed by: STUDENT IN AN ORGANIZED HEALTH CARE EDUCATION/TRAINING PROGRAM

## 2022-08-24 PROCEDURE — 96365 THER/PROPH/DIAG IV INF INIT: CPT

## 2022-08-24 PROCEDURE — 96372 THER/PROPH/DIAG INJ SC/IM: CPT | Mod: 59

## 2022-08-24 PROCEDURE — 25000003 PHARM REV CODE 250: Performed by: STUDENT IN AN ORGANIZED HEALTH CARE EDUCATION/TRAINING PROGRAM

## 2022-08-24 RX ORDER — HEPARIN 100 UNIT/ML
5 SYRINGE INTRAVENOUS
Status: DISCONTINUED | OUTPATIENT
Start: 2022-08-24 | End: 2022-08-24 | Stop reason: HOSPADM

## 2022-08-24 RX ORDER — HEPARIN 100 UNIT/ML
5 SYRINGE INTRAVENOUS
Status: CANCELLED | OUTPATIENT
Start: 2022-08-24

## 2022-08-24 RX ORDER — CYANOCOBALAMIN 1000 UG/ML
1000 INJECTION, SOLUTION INTRAMUSCULAR; SUBCUTANEOUS
Status: CANCELLED
Start: 2022-08-24

## 2022-08-24 RX ORDER — CYANOCOBALAMIN 1000 UG/ML
1000 INJECTION, SOLUTION INTRAMUSCULAR; SUBCUTANEOUS
Status: COMPLETED | OUTPATIENT
Start: 2022-08-24 | End: 2022-08-24

## 2022-08-24 RX ORDER — SODIUM CHLORIDE 9 MG/ML
INJECTION, SOLUTION INTRAVENOUS CONTINUOUS
Status: DISCONTINUED | OUTPATIENT
Start: 2022-08-24 | End: 2022-08-24 | Stop reason: HOSPADM

## 2022-08-24 RX ORDER — SODIUM CHLORIDE 0.9 % (FLUSH) 0.9 %
10 SYRINGE (ML) INJECTION
Status: CANCELLED | OUTPATIENT
Start: 2022-08-24

## 2022-08-24 RX ORDER — SODIUM CHLORIDE 0.9 % (FLUSH) 0.9 %
10 SYRINGE (ML) INJECTION
Status: DISCONTINUED | OUTPATIENT
Start: 2022-08-24 | End: 2022-08-24 | Stop reason: HOSPADM

## 2022-08-24 RX ORDER — SODIUM CHLORIDE 9 MG/ML
INJECTION, SOLUTION INTRAVENOUS CONTINUOUS
Status: CANCELLED | OUTPATIENT
Start: 2022-08-24

## 2022-08-24 RX ADMIN — SODIUM CHLORIDE: 0.9 INJECTION, SOLUTION INTRAVENOUS at 02:08

## 2022-08-24 RX ADMIN — CYANOCOBALAMIN 1000 MCG: 1000 INJECTION INTRAMUSCULAR; SUBCUTANEOUS at 02:08

## 2022-08-24 RX ADMIN — FERRIC CARBOXYMALTOSE INJECTION 750 MG: 50 INJECTION, SOLUTION INTRAVENOUS at 02:08

## 2022-08-24 NOTE — PLAN OF CARE
Vital Signs Stable, No apparent distress noted; Pt tolerated ___Injectafer__ infusion and B12 w/o difficulty.  24g piv removed;No bleeding,swelling or drainage noted to the site;coban and gauze applied  Discharge instructions given;Pt verbalizes understanding. Next appointment scheduled  Discharged home ;pt ambulated from clinic in [facility],accompanied by

## 2022-08-25 ENCOUNTER — TELEPHONE (OUTPATIENT)
Dept: PRIMARY CARE CLINIC | Facility: CLINIC | Age: 52
End: 2022-08-25
Payer: MEDICAID

## 2022-08-25 NOTE — TELEPHONE ENCOUNTER
----- Message from Felipe Sarabia MD sent at 8/25/2022 12:29 PM CDT -----  Contact: Self/[phone]  There is no report in yet. Not sure what the issue is. Still waiting for read  ----- Message -----  From: Debbie Ribeiro  Sent: 8/25/2022  11:35 AM CDT  To: [facility] Staff    2TESTRESULTS    Type: Test Results    What test was performed?Mammogram    Who ordered the test?Dr Sarabia     When and where were the test performed? 8/16 [facility]     Would you like response via [facility]: No     Comments:

## 2022-08-26 NOTE — TELEPHONE ENCOUNTER
Alba,    Do you know who we can contact at [facility] to see why her Mammogram hasn't been resulted yet?

## 2022-08-29 ENCOUNTER — TELEPHONE (OUTPATIENT)
Dept: RADIOLOGY | Facility: OTHER | Age: 52
End: 2022-08-29
Payer: MEDICAID

## 2022-08-29 NOTE — TELEPHONE ENCOUNTER
Notified pt [facility] states they have no record of prior mammogram images there. Inquired if pt used any other names, no other name was given. Pt states if it was not there she is not sure where it would have been done. Pt instructed mammogram will be reviewed by the radiologist and results will be released into the patient portal. Someone will follow up with her her if additional imaging is needed. Pt verbalized understanding.

## 2022-08-31 ENCOUNTER — TELEPHONE (OUTPATIENT)
Dept: INFUSION THERAPY | Facility: OTHER | Age: 52
End: 2022-08-31
Payer: MEDICAID

## 2022-09-01 ENCOUNTER — OFFICE VISIT (OUTPATIENT)
Dept: PODIATRY | Facility: CLINIC | Age: 52
End: 2022-09-01
Payer: MEDICAID

## 2022-09-01 VITALS
HEIGHT: 68 IN | DIASTOLIC BLOOD PRESSURE: 77 MMHG | SYSTOLIC BLOOD PRESSURE: 131 MMHG | BODY MASS INDEX: 38.49 KG/M2 | WEIGHT: 254 LBS | HEART RATE: 83 BPM

## 2022-09-01 DIAGNOSIS — M76.821 POSTERIOR TIBIAL TENDINITIS OF RIGHT LOWER EXTREMITY: ICD-10-CM

## 2022-09-01 DIAGNOSIS — M79.671 PAIN OF RIGHT HEEL: Primary | ICD-10-CM

## 2022-09-01 DIAGNOSIS — R22.41 LOCALIZED SWELLING, MASS AND LUMP, RIGHT LOWER LIMB: ICD-10-CM

## 2022-09-01 DIAGNOSIS — M72.2 PLANTAR FASCIITIS OF RIGHT FOOT: ICD-10-CM

## 2022-09-01 PROCEDURE — 3044F HG A1C LEVEL LT 7.0%: CPT | Mod: CPTII,,, | Performed by: PODIATRIST

## 2022-09-01 PROCEDURE — 1159F PR MEDICATION LIST DOCUMENTED IN MEDICAL RECORD: ICD-10-PCS | Mod: CPTII,,, | Performed by: PODIATRIST

## 2022-09-01 PROCEDURE — 99999 PR PBB SHADOW E&M-EST. PATIENT-LVL III: CPT | Mod: PBBFAC,,, | Performed by: PODIATRIST

## 2022-09-01 PROCEDURE — 1159F MED LIST DOCD IN RCRD: CPT | Mod: CPTII,,, | Performed by: PODIATRIST

## 2022-09-01 PROCEDURE — 99214 OFFICE O/P EST MOD 30 MIN: CPT | Mod: S$PBB,,, | Performed by: PODIATRIST

## 2022-09-01 PROCEDURE — 3008F BODY MASS INDEX DOCD: CPT | Mod: CPTII,,, | Performed by: PODIATRIST

## 2022-09-01 PROCEDURE — 99999 PR PBB SHADOW E&M-EST. PATIENT-LVL III: ICD-10-PCS | Mod: PBBFAC,,, | Performed by: PODIATRIST

## 2022-09-01 PROCEDURE — 99214 PR OFFICE/OUTPT VISIT, EST, LEVL IV, 30-39 MIN: ICD-10-PCS | Mod: S$PBB,,, | Performed by: PODIATRIST

## 2022-09-01 PROCEDURE — 3078F DIAST BP <80 MM HG: CPT | Mod: CPTII,,, | Performed by: PODIATRIST

## 2022-09-01 PROCEDURE — 3044F PR MOST RECENT HEMOGLOBIN A1C LEVEL <7.0%: ICD-10-PCS | Mod: CPTII,,, | Performed by: PODIATRIST

## 2022-09-01 PROCEDURE — 3075F PR MOST RECENT SYSTOLIC BLOOD PRESS GE 130-139MM HG: ICD-10-PCS | Mod: CPTII,,, | Performed by: PODIATRIST

## 2022-09-01 PROCEDURE — 3075F SYST BP GE 130 - 139MM HG: CPT | Mod: CPTII,,, | Performed by: PODIATRIST

## 2022-09-01 PROCEDURE — 3008F PR BODY MASS INDEX (BMI) DOCUMENTED: ICD-10-PCS | Mod: CPTII,,, | Performed by: PODIATRIST

## 2022-09-01 PROCEDURE — 3078F PR MOST RECENT DIASTOLIC BLOOD PRESSURE < 80 MM HG: ICD-10-PCS | Mod: CPTII,,, | Performed by: PODIATRIST

## 2022-09-01 PROCEDURE — 99213 OFFICE O/P EST LOW 20 MIN: CPT | Mod: PBBFAC,PN | Performed by: PODIATRIST

## 2022-09-01 NOTE — PROGRESS NOTES
Subjective:      Patient ID: Gisela Monique is a 52 y.o. female.    Chief Complaint: No chief complaint on file.    Gisela is a 52 y.o. female who presents, accompanied by her , for follow-up of heel pain R. Was here about 6 wks ago. The pain is described as stabbing/piercing pain heel pain after periods of rest. Also, started medial R ankle lump x 1 month. Gisela rates the pain as 10/10.  She has been using inserts w/ PPT arch pads & gelstrap in tennis shoes in house w/out improvement. Injection R heel helped for about 2 wks.w/ 50% relief.     Being worked up for chronic bruising w/ hem/onc.   Anemic - has had blood & Fe infusion.  Past Medical History:   Diagnosis Date    Anxiety     Rheumatoid arthritis     Thyroid disease      Patient Active Problem List   Diagnosis    PTSD (post-traumatic stress disorder)    Bipolar 1 disorder    Neck pain    Iron deficiency anemia    B12 deficiency    Status post gastric surgery     PCP: Felipe Sarabia MD  DOLV: 11/29/21   Hem/Onc: Jerri Garsia MD 7/18/22    Objective:      Physical Exam  Vitals reviewed.   Constitutional:       Appearance: She is well-developed. She is morbidly obese.   Cardiovascular:      Pulses: Normal pulses.           Dorsalis pedis pulses are 2+ on the right side.   Musculoskeletal:         General: Swelling, tenderness and signs of injury (remote) present.      Right lower leg: No edema.      Left lower leg: No edema.      Right ankle: Swelling present. No deformity or ecchymosis. No tenderness. Normal range of motion. Anterior drawer test negative.      Right foot: Normal range of motion. Swelling, deformity (flexible cavus), tenderness and bony tenderness present.        Feet:    Feet:      Right foot:      Skin integrity: Skin integrity normal.      Comments: RLE:     Equinus w/ < 90 deg foot to leg noted with knees extended.     MS strength of extrinsics to foot and ankle B/L + 5/5 in DF/PF/Ev to resistance with no reproduction of  pain in any direction. Weakness & pain medial ankle retromalleolarly w/ TTP along PT tendon. Patient is able to double heel raise w/ holding on to chair arm but unable to do a SHR R; painful.    Passive ROM of ankle and pedal joints is painless and without crepitation   Skin:     General: Skin is warm and dry.      Capillary Refill: Capillary refill takes less than 2 seconds.      Findings: No bruising or erythema.   Neurological:      Mental Status: She is alert and oriented to person, place, and time.      Sensory: No sensory deficit.      Motor: Weakness present. No abnormal muscle tone.      Gait: Gait abnormal.   Psychiatric:         Mood and Affect: Mood and affect normal.         Behavior: Behavior normal. Behavior is cooperative.       Assessment:      Encounter Diagnoses   Name Primary?    Pain of right heel Yes    Plantar fasciitis of right foot     Localized swelling, mass and lump, right lower limb     Posterior tibial tendinitis of right lower extremity          Problem List Items Addressed This Visit    None  Visit Diagnoses       Pain of right heel    -  Primary    Plantar fasciitis of right foot        Localized swelling, mass and lump, right lower limb        Posterior tibial tendinitis of right lower extremity        Relevant Orders    IMMOBILIZER FOR HOME USE          Plan:       Diagnoses and all orders for this visit:    Pain of right heel    Plantar fasciitis of right foot    Localized swelling, mass and lump, right lower limb    Posterior tibial tendinitis of right lower extremity  -     IMMOBILIZER FOR HOME USE    I counseled the patient on her conditions, their implications and medical management.    Discussed again general issues surrounding plantar fasciitis , as well as posterior tibial tendinitis, along with the advantages and disadvantages of various tx strategies.    - Shoe inspection. Patient instructed on proper supportive shoe gear, never walking without protective shoe  gear.    Additional PPT arch pad added to B/L shoes (so has double).     Continue gelstrap for MLA in shoes w/out arch pads.    To purchase arch orthotics OTC & bring them in for modification.    Use ASO ankle brace @ all times WB.    FU with me in 4-6 weeks, sooner prn.      Will re-evaluate PT tendo & determine if further workup necessary such as MRI to rule out partial tear vs tendinitis/attenuation.

## 2022-09-07 ENCOUNTER — INFUSION (OUTPATIENT)
Dept: INFUSION THERAPY | Facility: OTHER | Age: 52
End: 2022-09-07
Attending: STUDENT IN AN ORGANIZED HEALTH CARE EDUCATION/TRAINING PROGRAM
Payer: MEDICAID

## 2022-09-07 VITALS
OXYGEN SATURATION: 97 % | SYSTOLIC BLOOD PRESSURE: 141 MMHG | DIASTOLIC BLOOD PRESSURE: 85 MMHG | HEART RATE: 91 BPM | TEMPERATURE: 100 F | RESPIRATION RATE: 17 BRPM

## 2022-09-07 DIAGNOSIS — D50.0 IRON DEFICIENCY ANEMIA DUE TO CHRONIC BLOOD LOSS: Primary | ICD-10-CM

## 2022-09-07 PROCEDURE — 25000003 PHARM REV CODE 250: Performed by: STUDENT IN AN ORGANIZED HEALTH CARE EDUCATION/TRAINING PROGRAM

## 2022-09-07 PROCEDURE — 96372 THER/PROPH/DIAG INJ SC/IM: CPT | Mod: 59

## 2022-09-07 PROCEDURE — 63600175 PHARM REV CODE 636 W HCPCS: Mod: JG | Performed by: STUDENT IN AN ORGANIZED HEALTH CARE EDUCATION/TRAINING PROGRAM

## 2022-09-07 PROCEDURE — 96365 THER/PROPH/DIAG IV INF INIT: CPT

## 2022-09-07 RX ORDER — CYANOCOBALAMIN 1000 UG/ML
1000 INJECTION, SOLUTION INTRAMUSCULAR; SUBCUTANEOUS
Status: COMPLETED | OUTPATIENT
Start: 2022-09-07 | End: 2022-09-07

## 2022-09-07 RX ORDER — CYANOCOBALAMIN 1000 UG/ML
1000 INJECTION, SOLUTION INTRAMUSCULAR; SUBCUTANEOUS
Status: CANCELLED
Start: 2022-09-07

## 2022-09-07 RX ADMIN — CYANOCOBALAMIN 1000 MCG: 1000 INJECTION INTRAMUSCULAR; SUBCUTANEOUS at 01:09

## 2022-09-07 RX ADMIN — FERRIC CARBOXYMALTOSE INJECTION 750 MG: 50 INJECTION, SOLUTION INTRAVENOUS at 01:09

## 2022-09-07 NOTE — PLAN OF CARE
Injectafer infusion and B12 intramuscular injection administered, no reaction. Patient tolerated well. Patient accompanied by spouse for discharge home. 24 gauge IV removed, catheter intact. No apparent distress noted. Discharge instructions given to patient. Patient understands instructions.

## 2022-09-14 ENCOUNTER — TELEPHONE (OUTPATIENT)
Dept: OBSTETRICS AND GYNECOLOGY | Facility: CLINIC | Age: 52
End: 2022-09-14
Payer: MEDICAID

## 2022-09-14 NOTE — TELEPHONE ENCOUNTER
Spoke to pt in regards to message. Pt VU    ----- Message from Harjit Roach sent at 9/14/2022  2:46 PM CDT -----  PLEASE CALL PT SHE IS HAVING SEVERE STOMACH PAINS [phone]

## 2022-09-27 ENCOUNTER — PATIENT MESSAGE (OUTPATIENT)
Dept: INFUSION THERAPY | Facility: OTHER | Age: 52
End: 2022-09-27
Payer: MEDICAID

## 2022-09-27 ENCOUNTER — PATIENT MESSAGE (OUTPATIENT)
Dept: PRIMARY CARE CLINIC | Facility: CLINIC | Age: 52
End: 2022-09-27
Payer: MEDICAID

## 2022-10-03 ENCOUNTER — PATIENT MESSAGE (OUTPATIENT)
Dept: ADMINISTRATIVE | Facility: HOSPITAL | Age: 52
End: 2022-10-03
Payer: MEDICAID

## 2022-10-10 ENCOUNTER — TELEPHONE (OUTPATIENT)
Dept: HEMATOLOGY/ONCOLOGY | Facility: CLINIC | Age: 52
End: 2022-10-10
Payer: MEDICAID

## 2022-10-10 DIAGNOSIS — Z12.11 SCREENING FOR COLON CANCER: ICD-10-CM

## 2022-10-10 NOTE — TELEPHONE ENCOUNTER
Returned call and spoke with Ms Monique. Ms Monique states she has been extremely fatigued and weak  for the past 7 days. Ms Monique states it has been difficult for her to get out of her Bed due to her weakness.She has notice she is passing blood in her stool. Patient advised to go the ER for further evaluation. Ms Monique states she will go to the ER today.

## 2022-10-12 ENCOUNTER — PATIENT MESSAGE (OUTPATIENT)
Dept: INFUSION THERAPY | Facility: OTHER | Age: 52
End: 2022-10-12
Payer: MEDICAID

## 2022-10-18 DIAGNOSIS — D53.9 NUTRITIONAL ANEMIA: Primary | ICD-10-CM

## 2022-10-27 PROBLEM — R07.81 RIB PAIN: Status: ACTIVE | Noted: 2022-10-27

## 2022-10-27 PROBLEM — S20.212A RIB CONTUSION, LEFT, INITIAL ENCOUNTER: Status: ACTIVE | Noted: 2022-10-27

## 2022-11-10 LAB — HEMOCCULT STL QL IA: NEGATIVE

## 2022-12-06 ENCOUNTER — PATIENT MESSAGE (OUTPATIENT)
Dept: OBSTETRICS AND GYNECOLOGY | Facility: CLINIC | Age: 52
End: 2022-12-06
Payer: MEDICAID

## 2022-12-08 ENCOUNTER — PATIENT MESSAGE (OUTPATIENT)
Dept: OBSTETRICS AND GYNECOLOGY | Facility: CLINIC | Age: 52
End: 2022-12-08
Payer: MEDICAID

## 2022-12-08 NOTE — TELEPHONE ENCOUNTER
Call returned appt has been scheduled at [facility]. All questions answered

## 2022-12-09 ENCOUNTER — PATIENT MESSAGE (OUTPATIENT)
Dept: OBSTETRICS AND GYNECOLOGY | Facility: CLINIC | Age: 52
End: 2022-12-09
Payer: MEDICAID

## 2022-12-13 ENCOUNTER — OFFICE VISIT (OUTPATIENT)
Dept: CARDIOLOGY | Facility: CLINIC | Age: 52
End: 2022-12-13
Payer: MEDICAID

## 2022-12-13 ENCOUNTER — TELEPHONE (OUTPATIENT)
Dept: CARDIOLOGY | Facility: CLINIC | Age: 52
End: 2022-12-13
Payer: MEDICAID

## 2022-12-13 ENCOUNTER — OFFICE VISIT (OUTPATIENT)
Dept: HEMATOLOGY/ONCOLOGY | Facility: CLINIC | Age: 52
End: 2022-12-13
Payer: MEDICAID

## 2022-12-13 VITALS
DIASTOLIC BLOOD PRESSURE: 86 MMHG | HEIGHT: 68 IN | SYSTOLIC BLOOD PRESSURE: 152 MMHG | HEART RATE: 115 BPM | WEIGHT: 243 LBS | OXYGEN SATURATION: 98 % | BODY MASS INDEX: 36.83 KG/M2

## 2022-12-13 DIAGNOSIS — F41.9 ANXIETY: ICD-10-CM

## 2022-12-13 DIAGNOSIS — D53.9 NUTRITIONAL ANEMIA: Primary | ICD-10-CM

## 2022-12-13 DIAGNOSIS — D50.0 IRON DEFICIENCY ANEMIA DUE TO CHRONIC BLOOD LOSS: ICD-10-CM

## 2022-12-13 DIAGNOSIS — E53.8 B12 DEFICIENCY: ICD-10-CM

## 2022-12-13 DIAGNOSIS — I10 PRIMARY HYPERTENSION: ICD-10-CM

## 2022-12-13 DIAGNOSIS — Z98.890 STATUS POST GASTRIC SURGERY: ICD-10-CM

## 2022-12-13 DIAGNOSIS — F31.9 BIPOLAR 1 DISORDER: ICD-10-CM

## 2022-12-13 DIAGNOSIS — Z13.6 ENCOUNTER FOR SCREENING FOR CARDIOVASCULAR DISORDERS: Primary | ICD-10-CM

## 2022-12-13 DIAGNOSIS — R53.83 OTHER FATIGUE: ICD-10-CM

## 2022-12-13 DIAGNOSIS — R07.89 OTHER CHEST PAIN: ICD-10-CM

## 2022-12-13 PROCEDURE — 99214 OFFICE O/P EST MOD 30 MIN: CPT | Mod: 95,,, | Performed by: STUDENT IN AN ORGANIZED HEALTH CARE EDUCATION/TRAINING PROGRAM

## 2022-12-13 PROCEDURE — 3079F DIAST BP 80-89 MM HG: CPT | Mod: CPTII,,,

## 2022-12-13 PROCEDURE — 1159F PR MEDICATION LIST DOCUMENTED IN MEDICAL RECORD: ICD-10-PCS | Mod: CPTII,,,

## 2022-12-13 PROCEDURE — 1160F RVW MEDS BY RX/DR IN RCRD: CPT | Mod: CPTII,95,, | Performed by: STUDENT IN AN ORGANIZED HEALTH CARE EDUCATION/TRAINING PROGRAM

## 2022-12-13 PROCEDURE — 3044F PR MOST RECENT HEMOGLOBIN A1C LEVEL <7.0%: ICD-10-PCS | Mod: CPTII,,,

## 2022-12-13 PROCEDURE — 3079F PR MOST RECENT DIASTOLIC BLOOD PRESSURE 80-89 MM HG: ICD-10-PCS | Mod: CPTII,,,

## 2022-12-13 PROCEDURE — 99999 PR PBB SHADOW E&M-EST. PATIENT-LVL IV: CPT | Mod: PBBFAC,,,

## 2022-12-13 PROCEDURE — 99204 PR OFFICE/OUTPT VISIT, NEW, LEVL IV, 45-59 MIN: ICD-10-PCS | Mod: S$PBB,25,,

## 2022-12-13 PROCEDURE — 93005 ELECTROCARDIOGRAM TRACING: CPT | Mod: PBBFAC,PN | Performed by: INTERNAL MEDICINE

## 2022-12-13 PROCEDURE — 3077F SYST BP >= 140 MM HG: CPT | Mod: CPTII,,,

## 2022-12-13 PROCEDURE — 1159F PR MEDICATION LIST DOCUMENTED IN MEDICAL RECORD: ICD-10-PCS | Mod: CPTII,95,, | Performed by: STUDENT IN AN ORGANIZED HEALTH CARE EDUCATION/TRAINING PROGRAM

## 2022-12-13 PROCEDURE — 3008F PR BODY MASS INDEX (BMI) DOCUMENTED: ICD-10-PCS | Mod: CPTII,,,

## 2022-12-13 PROCEDURE — 99204 OFFICE O/P NEW MOD 45 MIN: CPT | Mod: S$PBB,25,,

## 2022-12-13 PROCEDURE — 99214 OFFICE O/P EST MOD 30 MIN: CPT | Mod: PBBFAC,PN

## 2022-12-13 PROCEDURE — 1159F MED LIST DOCD IN RCRD: CPT | Mod: CPTII,95,, | Performed by: STUDENT IN AN ORGANIZED HEALTH CARE EDUCATION/TRAINING PROGRAM

## 2022-12-13 PROCEDURE — 3044F HG A1C LEVEL LT 7.0%: CPT | Mod: CPTII,,,

## 2022-12-13 PROCEDURE — 93010 ELECTROCARDIOGRAM REPORT: CPT | Mod: S$PBB,,, | Performed by: INTERNAL MEDICINE

## 2022-12-13 PROCEDURE — 93010 EKG 12-LEAD: ICD-10-PCS | Mod: S$PBB,,, | Performed by: INTERNAL MEDICINE

## 2022-12-13 PROCEDURE — 99999 PR PBB SHADOW E&M-EST. PATIENT-LVL IV: ICD-10-PCS | Mod: PBBFAC,,,

## 2022-12-13 PROCEDURE — 1160F PR REVIEW ALL MEDS BY PRESCRIBER/CLIN PHARMACIST DOCUMENTED: ICD-10-PCS | Mod: CPTII,95,, | Performed by: STUDENT IN AN ORGANIZED HEALTH CARE EDUCATION/TRAINING PROGRAM

## 2022-12-13 PROCEDURE — 3044F HG A1C LEVEL LT 7.0%: CPT | Mod: CPTII,95,, | Performed by: STUDENT IN AN ORGANIZED HEALTH CARE EDUCATION/TRAINING PROGRAM

## 2022-12-13 PROCEDURE — 3077F PR MOST RECENT SYSTOLIC BLOOD PRESSURE >= 140 MM HG: ICD-10-PCS | Mod: CPTII,,,

## 2022-12-13 PROCEDURE — 99214 PR OFFICE/OUTPT VISIT, EST, LEVL IV, 30-39 MIN: ICD-10-PCS | Mod: 95,,, | Performed by: STUDENT IN AN ORGANIZED HEALTH CARE EDUCATION/TRAINING PROGRAM

## 2022-12-13 PROCEDURE — 3044F PR MOST RECENT HEMOGLOBIN A1C LEVEL <7.0%: ICD-10-PCS | Mod: CPTII,95,, | Performed by: STUDENT IN AN ORGANIZED HEALTH CARE EDUCATION/TRAINING PROGRAM

## 2022-12-13 PROCEDURE — 1159F MED LIST DOCD IN RCRD: CPT | Mod: CPTII,,,

## 2022-12-13 PROCEDURE — 3008F BODY MASS INDEX DOCD: CPT | Mod: CPTII,,,

## 2022-12-13 RX ORDER — HYDROXYZINE PAMOATE 25 MG/1
CAPSULE ORAL
Status: ON HOLD | COMMUNITY
Start: 2022-10-10 | End: 2023-03-27

## 2022-12-13 NOTE — ASSESSMENT & PLAN NOTE
-Goal BP < 130/80  -uncontrolled 152/86  -Patient notes prior medical therapy but was dc'd with weight loss  -monitor BP twice daily and maintain log and bring to all appts  -check BP X 2 week and send recordings to office

## 2022-12-13 NOTE — ASSESSMENT & PLAN NOTE
-Cardiac echo to evaluate heart function  -prior echo 2019   Cardiac echo 8/14/2019:  Summary:    1. Normal left ventricular systolic function.    2. Left ventricular wall thickness is moderately increased.    3. Mid -distal septum is probably hypokinetic.    4. Concentric left ventricular hypertrophy.    5. Severe pulmonary hypertension.    6. Technically difficult study.

## 2022-12-13 NOTE — PROGRESS NOTES
Subjective:    Patient ID:  Gisela Monique is a 52 y.o. female who presents for evaluation of Chest Pain      PCP: Felipe Sarabia MD     Referring Provider:     HPI: Patient is a 51 yo F w/PMH of HTN, anxiety, PTSD, Rheumatoid arthritis, hypothyroidism,  uterine CA, gastric bypass (18 yrs ago) , and anemia who presents today for initial evaluation and notes chest heaviness. Patient notes episode of CP awaken from sleep 9/10 heavy/ stabbing, duration 10 min, associated with diaphoresis and nausea. This is the second episode this year.Cardiology in 2019, abnormal cardiac echo and was recommended for NM stress test at that time., but patient did not complete the test. Significant family cardiac history father MI at age 60, brother MI age 40, both . Patient also notes palpitations at rest and with activity. Notes dizziness associated with palpitations X 2 years.She also reports fatigue. Patient also notes PND. Patient was on HTN meds in past and was Dc'd post weight loss. Patient monitor her BP at home ranging 150s/70s. Patient walks daily. Patient has decreased appetite     Patient denies SOB, palpitations, orthopnea, PND, pre-syncope, LOC, swelling, or claudication. Patient was followed by [facility]   Past Medical History:   Diagnosis Date    Anxiety     Bipolar disorder, unspecified     Depression     Rheumatoid arthritis     Thyroid disease      Past Surgical History:   Procedure Laterality Date     SECTION      GASTRIC BYPASS      HYSTERECTOMY      KNEE CARTILAGE SURGERY Bilateral     x2 to Right, x1 to Left     Social History     Socioeconomic History    Marital status:    Tobacco Use    Smoking status: Never    Smokeless tobacco: Never   Substance and Sexual Activity    Alcohol use: Yes     Comment: occasionally    Drug use: Not Currently    Sexual activity: Not Currently     Partners: Male     Family History   Problem Relation Age of Onset    Breast cancer Maternal Grandmother     Colon  "cancer Neg Hx     Ovarian cancer Neg Hx        Review of patient's allergies indicates:  No Known Allergies    Medication List with Changes/Refills   Current Medications    BUPROPION (WELLBUTRIN SR) 200 MG SR12    Take 200 mg by mouth.    BUTALBITAL-ACETAMINOPHEN-CAFFEINE -40 MG (FIORICET, ESGIC) -40 MG PER TABLET    Take 1 tablet by mouth every 6 (six) hours as needed for Headaches. Label with sedative precautions    CYANOCOBALAMIN 1,000 MCG/ML INJECTION    Inject intramuscularly 1 ml Weekly x 4 doses and then every 4 weeks    DICLOFENAC SODIUM (VOLTAREN) 1 % GEL    Apply 2 g topically 4 (four) times daily.    DULOXETINE (CYMBALTA) 60 MG CAPSULE    Take 60 mg by mouth once daily.    ERGOCALCIFEROL (ERGOCALCIFEROL) 50,000 UNIT CAP        ESTRADIOL (ESTRACE) 0.01 % (0.1 MG/GRAM) VAGINAL CREAM    Place 1 g vaginally every evening for 7 days, THEN 1 g twice a week.    FERROUS SULFATE (FEOSOL) 325 MG (65 MG IRON) TAB TABLET    Take 325 mg by mouth.    HYDROXYZINE PAMOATE (VISTARIL) 25 MG CAP    TAKE ONE CAPSULE BY MOUTH THREE TIMES DAILY IF NEEDED FOR ANXIETY    IBUPROFEN (ADVIL,MOTRIN) 800 MG TABLET    Take 1 tablet (800 mg total) by mouth every 6 (six) hours as needed for Pain.    IBUPROFEN (ADVIL,MOTRIN) 800 MG TABLET    Take 1 tablet (800 mg total) by mouth every 6 (six) hours as needed for Pain.    LEVOTHYROXINE (SYNTHROID) 50 MCG TABLET    Take 50 mcg by mouth once daily.    MULTIVITAMIN-MIN-IRON-FA-VIT K (BARIATRIC MULTIVITAMINS) 45 MG IRON- 800 MCG-120 MCG CAP    Take 1 capsule by mouth once daily.    MUPIROCIN (BACTROBAN) 2 % OINTMENT    Apply topically 3 (three) times daily.    ONDANSETRON (ZOFRAN-ODT) 4 MG TBDL    Take 1 tablet (4 mg total) by mouth every 6 (six) hours as needed.    PANTOPRAZOLE (PROTONIX) 40 MG TABLET    Take 1 tablet (40 mg total) by mouth once daily.    SYRINGE WITH NEEDLE 3 ML 22 X 1 1/2" SYRG    Use to inject B12 shots intramuscularly as directed.    TRAMADOL (ULTRAM) 50 MG " "TABLET    Take 1 tablet (50 mg total) by mouth every 6 (six) hours as needed for Pain.       Review of Systems   Constitutional: Positive for diaphoresis and malaise/fatigue. Negative for fever.   HENT:  Negative for congestion and hearing loss.    Eyes:  Negative for blurred vision and pain.   Cardiovascular:  Positive for chest pain, palpitations and paroxysmal nocturnal dyspnea. Negative for claudication, dyspnea on exertion, leg swelling, near-syncope and syncope.   Respiratory:  Negative for shortness of breath and sleep disturbances due to breathing.    Hematologic/Lymphatic: Negative for bleeding problem. Does not bruise/bleed easily.   Skin:  Negative for color change and poor wound healing.   Gastrointestinal:  Negative for abdominal pain and nausea.   Genitourinary:  Negative for bladder incontinence and flank pain.   Neurological:  Negative for focal weakness and light-headedness.      Objective:   BP (!) 152/86   Pulse (!) 115   Ht 5' 8" (1.727 m)   Wt 110.2 kg (243 lb)   SpO2 98%   BMI 36.95 kg/m²    Physical Exam  Constitutional:       Appearance: She is well-developed. She is not diaphoretic.   HENT:      Head: Normocephalic and atraumatic.   Eyes:      General: No scleral icterus.     Pupils: Pupils are equal, round, and reactive to light.   Neck:      Vascular: No JVD.   Cardiovascular:      Rate and Rhythm: Normal rate and regular rhythm.      Pulses: Intact distal pulses.      Heart sounds: S1 normal and S2 normal. No murmur heard.    No friction rub. No gallop.   Pulmonary:      Effort: Pulmonary effort is normal. No respiratory distress.      Breath sounds: Normal breath sounds. No wheezing or rales.   Chest:      Chest wall: No tenderness.   Abdominal:      General: Bowel sounds are normal. There is no distension.      Palpations: Abdomen is soft. There is no mass.      Tenderness: There is no abdominal tenderness. There is no rebound.   Musculoskeletal:         General: No tenderness. " Normal range of motion.      Cervical back: Normal range of motion and neck supple.   Skin:     General: Skin is warm and dry.      Coloration: Skin is not pale.   Neurological:      Mental Status: She is alert and oriented to person, place, and time.      Coordination: Coordination normal.      Deep Tendon Reflexes: Reflexes normal.   Psychiatric:         Behavior: Behavior normal.         Judgment: Judgment normal.           Cardiac echo 8/14/2019:  Summary:    1. Normal left ventricular systolic function.    2. Left ventricular wall thickness is moderately increased.    3. Mid -distal septum is probably hypokinetic.    4. Concentric left ventricular hypertrophy.    5. Severe pulmonary hypertension.    6. Technically difficult study.   Assessment:       1. Encounter for screening for cardiovascular disorders    2. Iron deficiency anemia due to chronic blood loss    3. Bipolar 1 disorder    4. Other chest pain    5. Status post gastric surgery    6. Primary hypertension    7. Other fatigue         Plan:         Iron deficiency anemia  -Followed by PCP  -continue medical therapy    Bipolar 1 disorder  -Followed by PCP  -Continue medical therapy    Other chest pain  -EKG  -Cardiac echo  -NM stress test     Status post gastric surgery  -Followed by PCP     Primary hypertension  -Goal BP < 130/80  -uncontrolled 152/86  -Patient notes prior medical therapy but was dc'd with weight loss  -monitor BP twice daily and maintain log and bring to all appts  -check BP X 2 week and send recordings to office     Other fatigue  -Cardiac echo to evaluate heart function  -prior echo 2019   Cardiac echo 8/14/2019:  Summary:    1. Normal left ventricular systolic function.    2. Left ventricular wall thickness is moderately increased.    3. Mid -distal septum is probably hypokinetic.    4. Concentric left ventricular hypertrophy.    5. Severe pulmonary hypertension.    6. Technically difficult study.       Total duration of face to  face visit time 30 minutes.  Total time spent counseling greater than fifty percent of total visit time.  Counseling included discussion regarding imaging findings, diagnosis, possibilities, treatment options, risks and benefits.  The patient had many questions regarding the options and long-term effects      Gregory Harris NP  Cardiology

## 2022-12-13 NOTE — Clinical Note
RTC 6 months with labs 3d prior  Please give patient number for psychiatry, where she can call and schedule appt with therapist. Referral placed. thanks

## 2022-12-13 NOTE — TELEPHONE ENCOUNTER
----- Message from Selene Frausto,  sent at 12/13/2022 10:09 AM CST -----  Regarding: RE: nm stress  12/21 @ 7am please have pt fast,.  ----- Message -----  From: Rosemarie Holt MA  Sent: 12/13/2022   9:55 AM CST  To: Selene Frausto RT  Subject: nm stress                                        Please set up NM stress, thank you

## 2022-12-13 NOTE — PROGRESS NOTES
Hematology- Oncology Clinic Note :      12/13/2022  The patient location is: [place]  The chief complaint leading to consultation is: see chief complaint below    Visit type: audiovisual    Face to Face time with patient: 10  30 minutes of total time spent on the encounter, which includes face to face time and non-face to face time preparing to see the patient (eg, review of tests), Obtaining and/or reviewing separately obtained history, Documenting clinical information in the electronic or other health record, Independently interpreting results (not separately reported) and communicating results to the patient/family/caregiver, or Care coordination (not separately reported).         Each patient to whom he or she provides medical services by telemedicine is:  (1) informed of the relationship between the physician and patient and the respective role of any other health care provider with respect to management of the patient; and (2) notified that he or she may decline to receive medical services by telemedicine and may withdraw from such care at any time.    RFV / chief complaint- Anemia        HPI  Pt is a 52 y.o. female who  has a past medical history of Anxiety, Bipolar disorder, unspecified, Depression, Rheumatoid arthritis, and Thyroid disease.   Pt presents to the clinic today for anemia     Pt has a history of partial hysterectomy due to uterine cancer and gastric bypass. Was hospitalized in October 2021 with GI bleed with EGD and CTA abd/pelvis negative for active site of bleed. Was transfused 2 units of pRBC and discharged as bleeding had stabilized. Denies tobacco use but does have 3-4 glasses of wine per week.       Feels depressed. Anxious, chest pain. Tearful today        Reviewed past medical/surgical/social history    Past Medical History:   Diagnosis Date    Anxiety     Bipolar disorder, unspecified     Depression     Rheumatoid arthritis     Thyroid disease       Past Surgical History:    Procedure Laterality Date     SECTION      GASTRIC BYPASS      HYSTERECTOMY      KNEE CARTILAGE SURGERY Bilateral     x2 to Right, x1 to Left      Review of patient's allergies indicates:  No Known Allergies   Social History     Tobacco Use    Smoking status: Never    Smokeless tobacco: Never   Substance Use Topics    Alcohol use: Yes     Comment: occasionally      Family History   Problem Relation Age of Onset    Breast cancer Maternal Grandmother     Colon cancer Neg Hx     Ovarian cancer Neg Hx           Review of Systems :  Review of Systems   Constitutional:  Positive for malaise/fatigue. Negative for chills, diaphoresis, fever and weight loss.   HENT: Negative.  Negative for congestion, hearing loss, nosebleeds, sore throat and tinnitus.    Eyes: Negative.  Negative for blurred vision and discharge.   Respiratory:  Negative for cough, hemoptysis, sputum production, shortness of breath and wheezing.    Cardiovascular: Negative.  Negative for chest pain, palpitations and leg swelling.   Gastrointestinal: Negative.  Negative for abdominal pain, blood in stool, constipation, diarrhea, heartburn, melena, nausea and vomiting.   Genitourinary: Negative.    Musculoskeletal: Negative.  Negative for back pain, falls, joint pain and myalgias.   Skin: Negative.  Negative for itching and rash.   Neurological: Negative.  Negative for dizziness, tingling, sensory change, speech change, focal weakness, seizures, loss of consciousness, weakness and headaches.   Endo/Heme/Allergies: Negative.  Does not bruise/bleed easily.   Psychiatric/Behavioral:  Positive for depression. The patient is nervous/anxious. The patient does not have insomnia.              Physical Exam :  There were no vitals taken for this visit.  Wt Readings from Last 3 Encounters:   22 110.2 kg (243 lb)   10/27/22 113.4 kg (250 lb)   10/12/22 115.6 kg (254 lb 13.6 oz)       There is no height or weight on file to calculate BMI.      Physical  Exam  Constitutional:       General: She is not in acute distress.     Appearance: Normal appearance. She is not ill-appearing.   HENT:      Head: Normocephalic and atraumatic.      Right Ear: External ear normal.      Left Ear: External ear normal.   Eyes:      General: No scleral icterus.        Right eye: No discharge.         Left eye: No discharge.   Pulmonary:      Effort: Pulmonary effort is normal. No respiratory distress.   Skin:     Coloration: Skin is not jaundiced.      Findings: No erythema or rash.   Neurological:      Mental Status: She is alert and oriented to person, place, and time. Mental status is at baseline.   Psychiatric:         Mood and Affect: Mood is anxious and depressed. Affect is tearful.         Speech: Speech normal.         Behavior: Behavior normal.           Current Outpatient Medications   Medication Sig Dispense Refill    buPROPion (WELLBUTRIN SR) 200 MG SR12 Take 200 mg by mouth.      butalbital-acetaminophen-caffeine -40 mg (FIORICET, ESGIC) -40 mg per tablet Take 1 tablet by mouth every 6 (six) hours as needed for Headaches. Label with sedative precautions 20 tablet 0    cyanocobalamin 1,000 mcg/mL injection Inject intramuscularly 1 ml Weekly x 4 doses and then every 4 weeks 10 mL 1    diclofenac sodium (VOLTAREN) 1 % Gel Apply 2 g topically 4 (four) times daily. 350 g 2    DULoxetine (CYMBALTA) 60 MG capsule Take 60 mg by mouth once daily.      ergocalciferol (ERGOCALCIFEROL) 50,000 unit Cap       ferrous sulfate (FEOSOL) 325 mg (65 mg iron) Tab tablet Take 325 mg by mouth.      hydrOXYzine pamoate (VISTARIL) 25 MG Cap TAKE ONE CAPSULE BY MOUTH THREE TIMES DAILY IF NEEDED FOR ANXIETY      ibuprofen (ADVIL,MOTRIN) 800 MG tablet Take 1 tablet (800 mg total) by mouth every 6 (six) hours as needed for Pain. 20 tablet 0    levothyroxine (SYNTHROID) 50 MCG tablet Take 50 mcg by mouth once daily.      multivitamin-min-iron-FA-vit K (BARIATRIC MULTIVITAMINS) 45 mg iron-  "800 mcg-120 mcg Cap Take 1 capsule by mouth once daily. 30 capsule 11    mupirocin (BACTROBAN) 2 % ointment Apply topically 3 (three) times daily. 30 g 0    ondansetron (ZOFRAN-ODT) 4 MG TbDL Take 1 tablet (4 mg total) by mouth every 6 (six) hours as needed. 15 tablet 0    syringe with needle 3 mL 22 x 1 1/2" Syrg Use to inject B12 shots intramuscularly as directed. 16 each 1     No current facility-administered medications for this visit.       Pertinent Diagnostic studies:      Lab Visit on 12/07/2022   Component Date Value Ref Range Status    WBC 12/07/2022 9.38  3.90 - 12.70 K/uL Final    RBC 12/07/2022 4.94  4.00 - 5.40 M/uL Final    Hemoglobin 12/07/2022 16.0  12.0 - 16.0 g/dL Final    Hematocrit 12/07/2022 49.2 (H)  37.0 - 48.5 % Final    MCV 12/07/2022 100 (H)  82 - 98 fL Final    MCH 12/07/2022 32.4 (H)  27.0 - 31.0 pg Final    MCHC 12/07/2022 32.5  32.0 - 36.0 g/dL Final    RDW 12/07/2022 12.4  11.5 - 14.5 % Final    Platelets 12/07/2022 309  150 - 450 K/uL Final    MPV 12/07/2022 10.4  9.2 - 12.9 fL Final    Immature Granulocytes 12/07/2022 0.1  0.0 - 0.5 % Final    Gran # (ANC) 12/07/2022 6.0  1.8 - 7.7 K/uL Final    Immature Grans (Abs) 12/07/2022 0.01  0.00 - 0.04 K/uL Final    Comment: Mild elevation in immature granulocytes is non specific and   can be seen in a variety of conditions including stress response,   acute inflammation, trauma and pregnancy. Correlation with other   laboratory and clinical findings is essential.      Lymph # 12/07/2022 2.3  1.0 - 4.8 K/uL Final    Mono # 12/07/2022 0.8  0.3 - 1.0 K/uL Final    Eos # 12/07/2022 0.1  0.0 - 0.5 K/uL Final    Baso # 12/07/2022 0.11  0.00 - 0.20 K/uL Final    nRBC 12/07/2022 0  0 /100 WBC Final    Gran % 12/07/2022 64.1  38.0 - 73.0 % Final    Lymph % 12/07/2022 24.9  18.0 - 48.0 % Final    Mono % 12/07/2022 8.5  4.0 - 15.0 % Final    Eosinophil % 12/07/2022 1.2  0.0 - 8.0 % Final    Basophil % 12/07/2022 1.2  0.0 - 1.9 % Final    Differential " Method 12/07/2022 Automated   Final    Ferritin 12/07/2022 40  20.0 - 300.0 ng/mL Final    Copper 12/07/2022 1045  810 - 1990 ug/L Final    Comment: Copper values may be elevated to twice the normal   levels in pregnancy.      Elevated results may be due to sample collected in a   non-certified trace element-free tube.     This test was developed and the performance   characteristics determined by [facility].   It has not been cleared or approved by the FDA.   The laboratory is regulated under CLIA as qualified to   perform high-complexity testing. This test is used for   patient testing purposes. It should not be regarded   as investigational or for research.    Test performed at [facility] [place] [facility],  [address] [place]  [address]     [phone]  Monisha Ralph MD, PhD - Medical Director      Methlymalonic Acid 12/07/2022 0.10  <0.40 umol/L Final    Comment: If applicable, any drug confirmation testing reported  here was developed and the performance characteristics  determined by [facility] [place] [facility]. This   confirmation testing has not been cleared or approved  by the FDA. The laboratory is regulated under CLIA as  qualified to perform high-complexity testing. This test  is used for patient testing purposes. It should not be  regarded as investigational or for research.    Test performed at [facility] [place] [facility],  [address] [place]  [address]     [phone]  Monisha Ralph MD, PhD - Medical Director      Vitamin B-12 12/07/2022 469  210 - 950 pg/mL Final    Iron 12/07/2022 152  30 - 160 ug/dL Final    Transferrin 12/07/2022 353  200 - 375 mg/dL Final    TIBC 12/07/2022 522 (H)  250 - 450 ug/dL Final    Saturated Iron 12/07/2022 29  20 - 50 % Final    Zinc 12/07/2022 79  60 - 130 ug/dL Final    Comment: Elevated results may be due to sample collected in a   non-certified trace element-free tube.     This test was developed and the performance    characteristics determined by [facility].   It has not been cleared or approved by the FDA.   The laboratory is regulated under CLIA as qualified to   perform high-complexity testing. This test is used for   patient testing purposes. It should not be regarded   as investigational or for research.    Test performed at [facility],  [address], [place]  [address]     [phone]  Monisha Ralph MD, PhD - Medical Director      Folate 12/07/2022 6.5  4.0 - 24.0 ng/mL Final           Oncology History    No history exists.     Assessment :       1. Nutritional anemia    2. Iron deficiency anemia due to chronic blood loss    3. B12 deficiency    4. Status post gastric surgery    5. Anxiety        Plan :       Iron def anemia due to chronic blood loss . Hx of Gi bleeding req transfusion. Hx of gastric bypass   CTA abd/pelvis and EGD did not reveal source, will need colonoscopy and possibly may require capsule endoscopy  one dose of injectafer given on 2/17/22 .  Pt will continue to f/u with gi Dr[facility] Os[facility]  Continue iron rich diet and bariatric multivitamin   Parenteral b12 monthly shot for life    Lasb reviewed with pt. Hb and iron levels adequate.   RTC 4 to 6 months with repeat labs     Discussed side effects of Injectafer (or venofer) which include but are not limited to infusion reaction, shortness of breath, hives, rash, flushing, itching, headaches, skin discoloration at the injection site, nausea, vomiting, low phosphorus level, elevated blood pressure, elevated liver enzymes, risks to the unborn baby if pregnant. Patient understands the risks and agrees with proceeding with Injectafer (or venofer).     Depression- pt advised to see therapist and f/u with psychiatry    Electronically signed by Jerri Garsia    [facility]      Future Appointments   Date Time Provider Department Center   12/15/2022  3:00 PM GENESIS Lock MD [facility] OBGYN  [place] [facility]   12/20/2022  1:00 PM ECHO TECH, [facility] CARDIOLOGY [facility] CAR PRD [facility]   12/21/2022  7:00 AM [facility] NM1 GE INFINIA LIMIT 430LBS [facility] NUCLEAR [facility]   12/21/2022  8:30 AM [facility] NM1 GE INFINIA LIMIT [facility]   12/21/2022  8:30 AM ECHO TECH, [facility] CARDIOLOGY [facility] CAR PRD [facility]   1/13/2023  3:30 PM Fabio Geller MD [facility] OBGYN [facility]   2/14/2023  2:30 PM Gregory Harris NP [facility] CARD [place]           This note was created with voice recognition software.  Grammatical, syntax and spelling errors may be inevitable.

## 2022-12-15 ENCOUNTER — PATIENT MESSAGE (OUTPATIENT)
Dept: OBSTETRICS AND GYNECOLOGY | Facility: CLINIC | Age: 52
End: 2022-12-15
Payer: MEDICAID

## 2022-12-16 DIAGNOSIS — R07.89 OTHER CHEST PAIN: Primary | ICD-10-CM

## 2022-12-20 ENCOUNTER — TELEPHONE (OUTPATIENT)
Dept: CARDIOLOGY | Facility: CLINIC | Age: 52
End: 2022-12-20
Payer: MEDICAID

## 2022-12-20 NOTE — TELEPHONE ENCOUNTER
----- Message from Gregory Harris NP sent at 12/20/2022  3:39 PM CST -----  Please inform Ms Monique that her heart function is within normal limits on her cardiac echo.  Thanks, Gregory

## 2022-12-21 ENCOUNTER — TELEPHONE (OUTPATIENT)
Dept: CARDIOLOGY | Facility: CLINIC | Age: 52
End: 2022-12-21
Payer: MEDICAID

## 2022-12-21 DIAGNOSIS — E53.8 B12 DEFICIENCY: ICD-10-CM

## 2022-12-21 DIAGNOSIS — Z98.890 STATUS POST GASTRIC SURGERY: ICD-10-CM

## 2022-12-21 RX ORDER — SYRINGE W-NEEDLE,DISPOSAB,3 ML 25GX5/8"
SYRINGE, EMPTY DISPOSABLE MISCELLANEOUS
Qty: 16 EACH | Refills: 1 | Status: CANCELLED | OUTPATIENT
Start: 2022-12-21

## 2022-12-21 RX ORDER — SYRINGE AND NEEDLE,INSULIN,1ML 25GX1"
SYRINGE, EMPTY DISPOSABLE MISCELLANEOUS
Qty: 15 EACH | Refills: 1 | Status: SHIPPED | OUTPATIENT
Start: 2022-12-21 | End: 2023-09-19 | Stop reason: SDUPTHER

## 2022-12-21 RX ORDER — CYANOCOBALAMIN 1000 UG/ML
INJECTION, SOLUTION INTRAMUSCULAR; SUBCUTANEOUS
Qty: 10 ML | Refills: 1 | Status: SHIPPED | OUTPATIENT
Start: 2022-12-21 | End: 2023-01-17 | Stop reason: SDUPTHER

## 2022-12-21 NOTE — TELEPHONE ENCOUNTER
----- Message from Anna Tavera sent at 12/21/2022 10:45 AM CST -----  ..Type:  Test Results/returning office call    Who Called: patient  Name of Test (Lab/Mammo/Etc): Cardio  Date of Test: 12/20/2022  Ordering Provider: Steven   Where the test was performed: [facility]  Would the patient rather a call back or a response via [facility]? Yes   Best Call Back Number: [phone]  Additional Information:

## 2022-12-21 NOTE — TELEPHONE ENCOUNTER
Pt returning call from yesterday, pt was informed about results for Echo. Pt is questioning results for EKG. Are those results back?

## 2022-12-21 NOTE — TELEPHONE ENCOUNTER
Attempted to call pt no answer LVM to call office back for verbal results or check message sent via [employer].

## 2022-12-22 ENCOUNTER — TELEPHONE (OUTPATIENT)
Dept: PODIATRY | Facility: CLINIC | Age: 52
End: 2022-12-22
Payer: MEDICAID

## 2022-12-22 NOTE — TELEPHONE ENCOUNTER
Spoke with pt to inform her about EKG, Sinus rhythm had a few early beats, nothing acute. Also informed pt that she needs to make sure to go have stress test on 12/28 which will provide a definitive diagnosis. Pt stated that she is nervous about the test but she will be there.

## 2023-01-12 ENCOUNTER — TELEPHONE (OUTPATIENT)
Dept: ORTHOPEDICS | Facility: CLINIC | Age: 53
End: 2023-01-12
Payer: MEDICAID

## 2023-01-12 DIAGNOSIS — M25.562 LEFT KNEE PAIN, UNSPECIFIED CHRONICITY: Primary | ICD-10-CM

## 2023-01-12 NOTE — TELEPHONE ENCOUNTER
----- Message from Parvin Miner sent at 1/12/2023  3:01 PM CST -----  Consult/Advisory    Name Of Caller:Gisela  Contact Preference?:[phone]             What is the nature of the call?: Pt is requesting to be seen soon. She is having pain in her left knee due to a fall.Please call pt to further asist

## 2023-01-12 NOTE — TELEPHONE ENCOUNTER
Spoke with pt. Pt states she fell on her left knee today and is experiencing some significant pain. Appt scheduled for tomorrow to evaluate the knee. Pt advised she will need xrays prior to appt. Images ordered and scheduled. All questions answered. Pt verbalized understanding.

## 2023-01-13 ENCOUNTER — OFFICE VISIT (OUTPATIENT)
Dept: ORTHOPEDICS | Facility: CLINIC | Age: 53
End: 2023-01-13
Payer: MEDICAID

## 2023-01-13 ENCOUNTER — TELEPHONE (OUTPATIENT)
Dept: ORTHOPEDICS | Facility: CLINIC | Age: 53
End: 2023-01-13

## 2023-01-13 VITALS
SYSTOLIC BLOOD PRESSURE: 121 MMHG | HEIGHT: 68 IN | HEART RATE: 72 BPM | WEIGHT: 244.38 LBS | DIASTOLIC BLOOD PRESSURE: 79 MMHG | BODY MASS INDEX: 37.04 KG/M2

## 2023-01-13 DIAGNOSIS — M25.562 ACUTE PAIN OF LEFT KNEE: ICD-10-CM

## 2023-01-13 DIAGNOSIS — M76.32 IT BAND SYNDROME, LEFT: Primary | ICD-10-CM

## 2023-01-13 PROCEDURE — 3078F PR MOST RECENT DIASTOLIC BLOOD PRESSURE < 80 MM HG: ICD-10-PCS | Mod: CPTII,,,

## 2023-01-13 PROCEDURE — 3074F SYST BP LT 130 MM HG: CPT | Mod: CPTII,,,

## 2023-01-13 PROCEDURE — 3008F BODY MASS INDEX DOCD: CPT | Mod: CPTII,,,

## 2023-01-13 PROCEDURE — 99214 OFFICE O/P EST MOD 30 MIN: CPT | Mod: PBBFAC,PN

## 2023-01-13 PROCEDURE — 99999 PR PBB SHADOW E&M-EST. PATIENT-LVL IV: ICD-10-PCS | Mod: PBBFAC,,,

## 2023-01-13 PROCEDURE — 99999 PR PBB SHADOW E&M-EST. PATIENT-LVL IV: CPT | Mod: PBBFAC,,,

## 2023-01-13 PROCEDURE — 1159F MED LIST DOCD IN RCRD: CPT | Mod: CPTII,,,

## 2023-01-13 PROCEDURE — 3074F PR MOST RECENT SYSTOLIC BLOOD PRESSURE < 130 MM HG: ICD-10-PCS | Mod: CPTII,,,

## 2023-01-13 PROCEDURE — 1159F PR MEDICATION LIST DOCUMENTED IN MEDICAL RECORD: ICD-10-PCS | Mod: CPTII,,,

## 2023-01-13 PROCEDURE — 3008F PR BODY MASS INDEX (BMI) DOCUMENTED: ICD-10-PCS | Mod: CPTII,,,

## 2023-01-13 PROCEDURE — 3078F DIAST BP <80 MM HG: CPT | Mod: CPTII,,,

## 2023-01-13 PROCEDURE — 99214 OFFICE O/P EST MOD 30 MIN: CPT | Mod: S$PBB,,,

## 2023-01-13 PROCEDURE — 99214 PR OFFICE/OUTPT VISIT, EST, LEVL IV, 30-39 MIN: ICD-10-PCS | Mod: S$PBB,,,

## 2023-01-13 RX ORDER — DEXTROAMPHETAMINE SACCHARATE, AMPHETAMINE ASPARTATE, DEXTROAMPHETAMINE SULFATE AND AMPHETAMINE SULFATE 7.5; 7.5; 7.5; 7.5 MG/1; MG/1; MG/1; MG/1
1 TABLET ORAL 2 TIMES DAILY
Status: ON HOLD | COMMUNITY
Start: 2023-01-04 | End: 2023-03-27

## 2023-01-13 RX ORDER — LIDOCAINE HYDROCHLORIDE 20 MG/ML
5 SOLUTION OROPHARYNGEAL EVERY 6 HOURS PRN
Status: ON HOLD | COMMUNITY
Start: 2022-02-14 | End: 2023-03-27

## 2023-01-13 RX ORDER — GABAPENTIN 300 MG/1
300 CAPSULE ORAL 3 TIMES DAILY
Qty: 90 CAPSULE | Refills: 11 | Status: SHIPPED | OUTPATIENT
Start: 2023-01-13 | End: 2024-01-13

## 2023-01-13 RX ORDER — BUPROPION HYDROCHLORIDE 300 MG/1
300 TABLET ORAL
Status: ON HOLD | COMMUNITY
Start: 2023-01-04 | End: 2024-01-08 | Stop reason: HOSPADM

## 2023-01-13 NOTE — PROGRESS NOTES
Patient ID: Gisela Monique is a 53 y.o. female    Pain of the Left Knee and Pain of the Right Knee      History of Present Illness:    Gisela Monique presents to clinic for bilateral knee pain L > R. Patient reports yesterday she fell in a hole and fell directly onto her left knee. She has been using voltaren gel, had gastric bypass so cannot take NSAIDs. The pain started 1 days ago and is becoming progressively worse.  Pain is located over (points to) anterior left knee. She reports that the pain is a 10 /10 sharp pain toda. Flexion and extension worsens pain. No history gout. The pain is affecting ADLs and limiting desired level of activity. Denies numbness, tingling, and inability to bear weight. She feels her pain is radiating up to her hip. Denies history of gout, but does endorse eating a lot of seafood.     Trial of voltaren with no improvement. S/p R and L knee arthroscopies, most recent, 5ish years with Dr. Vivar in [place].     Mechanical symptoms: +   Instability: +    Occupation: retired    Ambulating: unassisted  Diabetic: no  Smoking: no  Hx of DVT/PE: no     PAST MEDICAL HISTORY:   Past Medical History:   Diagnosis Date    Anxiety     Bipolar disorder, unspecified     Depression     Rheumatoid arthritis     Thyroid disease      PAST SURGICAL HISTORY:   Past Surgical History:   Procedure Laterality Date     SECTION      GASTRIC BYPASS      HYSTERECTOMY      KNEE CARTILAGE SURGERY Bilateral     x2 to Right, x1 to Left     FAMILY HISTORY:   Family History   Problem Relation Age of Onset    Breast cancer Maternal Grandmother     Colon cancer Neg Hx     Ovarian cancer Neg Hx      SOCIAL HISTORY:   Social History     Occupational History    Not on file   Tobacco Use    Smoking status: Never    Smokeless tobacco: Never   Substance and Sexual Activity    Alcohol use: Yes     Comment: occasionally    Drug use: Not Currently    Sexual activity: Not Currently     Partners: Male     "    MEDICATIONS:   Current Outpatient Medications:     buPROPion (WELLBUTRIN XL) 300 MG 24 hr tablet, Take 300 mg by mouth., Disp: , Rfl:     butalbital-acetaminophen-caffeine -40 mg (FIORICET, ESGIC) -40 mg per tablet, Take 1 tablet by mouth every 6 (six) hours as needed for Headaches. Label with sedative precautions, Disp: 20 tablet, Rfl: 0    cyanocobalamin 1,000 mcg/mL injection, Inject intramuscularly 1 ml Weekly x 4 doses and then every 4 weeks, Disp: 10 mL, Rfl: 1    dextroamphetamine-amphetamine 30 mg Tab, Take 1 tablet by mouth 2 (two) times daily., Disp: , Rfl:     diclofenac sodium (VOLTAREN) 1 % Gel, Apply 2 g topically 4 (four) times daily., Disp: 350 g, Rfl: 2    DULoxetine (CYMBALTA) 60 MG capsule, Take 60 mg by mouth once daily., Disp: , Rfl:     ergocalciferol (ERGOCALCIFEROL) 50,000 unit Cap, , Disp: , Rfl:     ferrous sulfate (FEOSOL) 325 mg (65 mg iron) Tab tablet, Take 325 mg by mouth., Disp: , Rfl:     hydrOXYzine pamoate (VISTARIL) 25 MG Cap, TAKE ONE CAPSULE BY MOUTH THREE TIMES DAILY IF NEEDED FOR ANXIETY, Disp: , Rfl:     ibuprofen (ADVIL,MOTRIN) 800 MG tablet, Take 1 tablet (800 mg total) by mouth every 6 (six) hours as needed for Pain., Disp: 20 tablet, Rfl: 0    insulin syringe-needle U-100 (BD INSULIN SYRINGE) 1 mL 26 x 1/2" Syrg, Use to give b12 shot intramuscularly, Disp: 15 each, Rfl: 1    levothyroxine (SYNTHROID) 50 MCG tablet, Take 50 mcg by mouth once daily., Disp: , Rfl:     LIDOcaine HCl 2% (XYLOCAINE) 2 % Soln, 5 mLs by Transmucosal route every 6 (six) hours as needed., Disp: , Rfl:     multivitamin-min-iron-FA-vit K (BARIATRIC MULTIVITAMINS) 45 mg iron- 800 mcg-120 mcg Cap, Take 1 capsule by mouth once daily., Disp: 30 capsule, Rfl: 11    mupirocin (BACTROBAN) 2 % ointment, Apply topically 3 (three) times daily., Disp: 30 g, Rfl: 0    ondansetron (ZOFRAN-ODT) 4 MG TbDL, Take 1 tablet (4 mg total) by mouth every 6 (six) hours as needed., Disp: 15 tablet, Rfl: 0    " "syringe with needle 3 mL 22 x 1 1/2" Syrg, Use to inject B12 shots intramuscularly as directed., Disp: 16 each, Rfl: 1    buPROPion (WELLBUTRIN SR) 200 MG SR12, Take 200 mg by mouth., Disp: , Rfl:     gabapentin (NEURONTIN) 300 MG capsule, Take 1 capsule (300 mg total) by mouth 3 (three) times daily., Disp: 90 capsule, Rfl: 11  ALLERGIES: Review of patient's allergies indicates:  No Known Allergies      Physical Exam     Vitals:    01/13/23 1044   BP: 121/79   Pulse: 72     Alert and oriented to person, place and time. No acute distress. Well-groomed, not ill appearing. Pupils round and reactive, normal respiratory effort, no audible wheezing.     OBSERVATION / INSPECTION   Gait:   antalgic   Alignment:  Neutral   Scars:   None   Muscle atrophy: None  Effusion:  None   Warmth:  None   Discoloration:   None     TENDERNESS                 Patella     pos    Peripatellar medial    Negative   Peripatellar lateral   Negative   Patellar tendon   Negative   Quad tendon     Negative    Prepatellar Bursa   Negative     Tibial tubercle    Negative    Pes anserine/HS   Negative      ITB     pos      LCL     Negative  MFC     Negative  LFC     Negative  MCL      Negative  Medial Joint Line    Negative   Lateral Joint Line   pos  Quadriceps    Negative  Hamstring     Negative            Range of  Motion:        Left knee:   10-50° **  Right knee:    0-140°      Patellofemoral examination:     Patella position    Centered  Crepitus (PF):    Absent   Lateral tilt:    Normal   J-sign:     None   Patellofemoral grind:   No pain       MENISCAL SIGNS: - unable to assess 2/2 pain    Pain on terminal extension:  Negative  Pain on terminal flexion:  Negative  Serenas maneuver:  Negative     LIGAMENT EXAMINATION:  ACL / Lachman:  2+   PCL-Post.  drawer: normal 0 to 2mm  MCL- Valgus:  normal 0 to 2mm  LCL- Varus:    normal 0 to 2mm    Dial Test: difference c/w other side  At 30° flexion: normal (< 5°)   At 90° flexion: normal (< 5°) "       STRENGTH: (* = with pain)   Quadricep   5/5  Hamstrin/5    EXTREMITY NEURO-VASCULAR EXAMINATION:   Sensation:  Grossly intact to light touch all dermatomal regions.   Motor Function:  Fully intact motor function at hip, knee, foot and ankle    DTRs;  quadriceps and  achilles 2+.  No clonus and downgoing Babinski.    Vascular status:  DP and PT pulses 2+, brisk capillary refill, symmetric.     Imaging:     Bilateral X-rays ordered/reviewed by me showing no evidence of fracture or dislocation. There is no obvious malalignment. No evidence of masses, lesions or foreign bodies. Patellofemoral joint space narrowing bilaterally.     Assessment & Plan    It band syndrome, left  -     gabapentin (NEURONTIN) 300 MG capsule; Take 1 capsule (300 mg total) by mouth 3 (three) times daily.  Dispense: 90 capsule; Refill: 11    Acute pain of left knee  -     gabapentin (NEURONTIN) 300 MG capsule; Take 1 capsule (300 mg total) by mouth 3 (three) times daily.  Dispense: 90 capsule; Refill: 11         I made the decision to obtain old records of the patient including previous notes and imaging. New imaging was ordered today of the extremity or extremities evaluated. I independently reviewed and interpreted the radiographs and/or MRIs/CT scan today as well as prior imaging.    We discussed at length different treatment options including conservative vs surgical management. These include anti-inflammatories, acetaminophen, rest, ice, heat, formal physical therapy including strengthening and stretching exercises, home exercise programs, injections, dry needling, and finally surgical intervention.      With acute knee pain however having instability symptoms and positive Lachman's.  Patient would like to proceed with a trial of MRI left knee and Gabapentin.     MRI left knee to r/o ACL tear.  Gabapentin 300 mg TID for pain  Ice compress to the affected area 2-3x a day for 15-20 minutes as needed for pain  management  Consider PT if pain is refractory to conservative treatment    Follow up: virtually MRI results  X-rays next visit: none     All questions were answered and patient is agreeable to the above plan.

## 2023-01-13 NOTE — TELEPHONE ENCOUNTER
----- Message from Joana Sykes sent at 1/13/2023  1:29 PM CST -----  Regarding: Appt Access  Contact: [phone]  Pt is requesting a sooner MRI that's sched for 01/24/23.  Tried to schedule something sooner, but that is the next avail.  Please call.

## 2023-01-17 ENCOUNTER — PATIENT MESSAGE (OUTPATIENT)
Dept: HEMATOLOGY/ONCOLOGY | Facility: CLINIC | Age: 53
End: 2023-01-17
Payer: MEDICAID

## 2023-01-17 ENCOUNTER — PATIENT MESSAGE (OUTPATIENT)
Dept: ADMINISTRATIVE | Facility: OTHER | Age: 53
End: 2023-01-17
Payer: MEDICAID

## 2023-01-17 DIAGNOSIS — Z98.890 STATUS POST GASTRIC SURGERY: ICD-10-CM

## 2023-01-17 DIAGNOSIS — E53.8 B12 DEFICIENCY: ICD-10-CM

## 2023-01-17 RX ORDER — CYANOCOBALAMIN 1000 UG/ML
INJECTION, SOLUTION INTRAMUSCULAR; SUBCUTANEOUS
Qty: 1 ML | Refills: 11 | Status: SHIPPED | OUTPATIENT
Start: 2023-01-17 | End: 2023-09-19 | Stop reason: SDUPTHER

## 2023-01-25 ENCOUNTER — PATIENT MESSAGE (OUTPATIENT)
Dept: ORTHOPEDICS | Facility: CLINIC | Age: 53
End: 2023-01-25
Payer: MEDICAID

## 2023-01-26 ENCOUNTER — OFFICE VISIT (OUTPATIENT)
Dept: ORTHOPEDICS | Facility: CLINIC | Age: 53
End: 2023-01-26
Payer: MEDICAID

## 2023-01-26 VITALS
WEIGHT: 244.5 LBS | HEART RATE: 85 BPM | SYSTOLIC BLOOD PRESSURE: 134 MMHG | BODY MASS INDEX: 37.05 KG/M2 | DIASTOLIC BLOOD PRESSURE: 85 MMHG | HEIGHT: 68 IN

## 2023-01-26 DIAGNOSIS — S83.242D ACUTE MEDIAL MENISCUS TEAR, LEFT, SUBSEQUENT ENCOUNTER: Primary | ICD-10-CM

## 2023-01-26 PROCEDURE — 99214 OFFICE O/P EST MOD 30 MIN: CPT | Mod: S$PBB,,,

## 2023-01-26 PROCEDURE — 99999 PR PBB SHADOW E&M-EST. PATIENT-LVL IV: CPT | Mod: PBBFAC,,,

## 2023-01-26 PROCEDURE — 3079F PR MOST RECENT DIASTOLIC BLOOD PRESSURE 80-89 MM HG: ICD-10-PCS | Mod: CPTII,,,

## 2023-01-26 PROCEDURE — 99214 PR OFFICE/OUTPT VISIT, EST, LEVL IV, 30-39 MIN: ICD-10-PCS | Mod: S$PBB,,,

## 2023-01-26 PROCEDURE — 3008F PR BODY MASS INDEX (BMI) DOCUMENTED: ICD-10-PCS | Mod: CPTII,,,

## 2023-01-26 PROCEDURE — 1159F PR MEDICATION LIST DOCUMENTED IN MEDICAL RECORD: ICD-10-PCS | Mod: CPTII,,,

## 2023-01-26 PROCEDURE — 99214 OFFICE O/P EST MOD 30 MIN: CPT | Mod: PBBFAC,PN

## 2023-01-26 PROCEDURE — 3075F PR MOST RECENT SYSTOLIC BLOOD PRESS GE 130-139MM HG: ICD-10-PCS | Mod: CPTII,,,

## 2023-01-26 PROCEDURE — 99999 PR PBB SHADOW E&M-EST. PATIENT-LVL IV: ICD-10-PCS | Mod: PBBFAC,,,

## 2023-01-26 PROCEDURE — 1159F MED LIST DOCD IN RCRD: CPT | Mod: CPTII,,,

## 2023-01-26 PROCEDURE — 3079F DIAST BP 80-89 MM HG: CPT | Mod: CPTII,,,

## 2023-01-26 PROCEDURE — 3075F SYST BP GE 130 - 139MM HG: CPT | Mod: CPTII,,,

## 2023-01-26 PROCEDURE — 3008F BODY MASS INDEX DOCD: CPT | Mod: CPTII,,,

## 2023-01-26 RX ORDER — TRAMADOL HYDROCHLORIDE 50 MG/1
50 TABLET ORAL EVERY 6 HOURS PRN
Qty: 24 EACH | Refills: 0 | Status: ON HOLD | OUTPATIENT
Start: 2023-01-26 | End: 2023-03-27

## 2023-01-26 NOTE — PROGRESS NOTES
Patient ID: Gisela Monique is a 53 y.o. female    Results of the Left Knee      History of Present Illness:    Gisela Monique presents to clinic for bilateral knee pain L > R. Patient reports yesterday she fell in a hole and fell directly onto her left knee. She has been using voltaren gel, had gastric bypass so cannot take NSAIDs. The pain started 1 days ago and is becoming progressively worse.  Pain is located over (points to) anterior left knee. She reports that the pain is a 10 /10 sharp pain toda. Flexion and extension worsens pain. No history gout. The pain is affecting ADLs and limiting desired level of activity. Denies numbness, tingling, and inability to bear weight. She feels her pain is radiating up to her hip. Denies history of gout, but does endorse eating a lot of seafood.     Trial of voltaren with no improvement. S/p R and L knee arthroscopies, most recent, 5ish years with Dr. Vivar in [place].     Mechanical symptoms: +   Instability: +    Occupation: retired    Ambulating: unassisted  Diabetic: no  Smoking: no  Hx of DVT/PE: no     ____________________________________________________________________    Interval history 2023: Patient returns today for left knee MRI results. She states pain has worsened. Able to ambulate unassisted.       PAST MEDICAL HISTORY:   Past Medical History:   Diagnosis Date    Anxiety     Bipolar disorder, unspecified     Depression     Rheumatoid arthritis     Thyroid disease      PAST SURGICAL HISTORY:   Past Surgical History:   Procedure Laterality Date     SECTION      GASTRIC BYPASS      HYSTERECTOMY      KNEE CARTILAGE SURGERY Bilateral     x2 to Right, x1 to Left     FAMILY HISTORY:   Family History   Problem Relation Age of Onset    Breast cancer Maternal Grandmother     Colon cancer Neg Hx     Ovarian cancer Neg Hx      SOCIAL HISTORY:   Social History     Occupational History    Not on file   Tobacco Use    Smoking status: Never    Smokeless  "tobacco: Never   Substance and Sexual Activity    Alcohol use: Yes     Comment: occasionally    Drug use: Not Currently    Sexual activity: Not Currently     Partners: Male        MEDICATIONS:   Current Outpatient Medications:     buPROPion (WELLBUTRIN SR) 200 MG SR12, Take 200 mg by mouth., Disp: , Rfl:     buPROPion (WELLBUTRIN XL) 300 MG 24 hr tablet, Take 300 mg by mouth., Disp: , Rfl:     butalbital-acetaminophen-caffeine -40 mg (FIORICET, ESGIC) -40 mg per tablet, Take 1 tablet by mouth every 6 (six) hours as needed for Headaches. Label with sedative precautions, Disp: 20 tablet, Rfl: 0    cyanocobalamin 1,000 mcg/mL injection, Inject intramuscularly 1 ml every 4 weeks, Disp: 1 mL, Rfl: 11    dextroamphetamine-amphetamine 30 mg Tab, Take 1 tablet by mouth 2 (two) times daily., Disp: , Rfl:     diclofenac sodium (VOLTAREN) 1 % Gel, Apply 2 g topically 4 (four) times daily., Disp: 350 g, Rfl: 2    DULoxetine (CYMBALTA) 60 MG capsule, Take 60 mg by mouth once daily., Disp: , Rfl:     ergocalciferol (ERGOCALCIFEROL) 50,000 unit Cap, , Disp: , Rfl:     ferrous sulfate (FEOSOL) 325 mg (65 mg iron) Tab tablet, Take 325 mg by mouth., Disp: , Rfl:     gabapentin (NEURONTIN) 300 MG capsule, Take 1 capsule (300 mg total) by mouth 3 (three) times daily., Disp: 90 capsule, Rfl: 11    hydrOXYzine pamoate (VISTARIL) 25 MG Cap, TAKE ONE CAPSULE BY MOUTH THREE TIMES DAILY IF NEEDED FOR ANXIETY, Disp: , Rfl:     ibuprofen (ADVIL,MOTRIN) 800 MG tablet, Take 1 tablet (800 mg total) by mouth every 6 (six) hours as needed for Pain., Disp: 20 tablet, Rfl: 0    insulin syringe-needle U-100 (BD INSULIN SYRINGE) 1 mL 26 x 1/2" Syrg, Use to give b12 shot intramuscularly, Disp: 15 each, Rfl: 1    levothyroxine (SYNTHROID) 50 MCG tablet, Take 50 mcg by mouth once daily., Disp: , Rfl:     LIDOcaine HCl 2% (XYLOCAINE) 2 % Soln, 5 mLs by Transmucosal route every 6 (six) hours as needed., Disp: , Rfl:     " "multivitamin-min-iron-FA-vit K (BARIATRIC MULTIVITAMINS) 45 mg iron- 800 mcg-120 mcg Cap, Take 1 capsule by mouth once daily., Disp: 30 capsule, Rfl: 11    mupirocin (BACTROBAN) 2 % ointment, Apply topically 3 (three) times daily., Disp: 30 g, Rfl: 0    ondansetron (ZOFRAN-ODT) 4 MG TbDL, Take 1 tablet (4 mg total) by mouth every 6 (six) hours as needed., Disp: 15 tablet, Rfl: 0    syringe with needle 3 mL 22 x 1 1/2" Syrg, Use to inject B12 shots intramuscularly as directed., Disp: 16 each, Rfl: 1    traMADoL (ULTRAM) 50 mg tablet, Take 1 tablet (50 mg total) by mouth every 6 (six) hours as needed for Pain., Disp: 24 each, Rfl: 0  ALLERGIES: Review of patient's allergies indicates:  No Known Allergies      Physical Exam     Vitals:    01/26/23 1058   BP: 134/85   Pulse: 85       Alert and oriented to person, place and time. No acute distress. Well-groomed, not ill appearing. Pupils round and reactive, normal respiratory effort, no audible wheezing.     OBSERVATION / INSPECTION   Gait:   antalgic   Alignment:  Neutral   Scars:   None   Muscle atrophy: None  Effusion:  None   Warmth:  None   Discoloration:   None     TENDERNESS                 Patella     pos    Peripatellar medial    Negative   Peripatellar lateral   Negative   Patellar tendon   Negative   Quad tendon     Negative    Prepatellar Bursa   Negative     Tibial tubercle    Negative    Pes anserine/HS   Negative      ITB     pos      LCL     Negative  MFC     Negative  LFC     Negative  MCL      Negative  Medial Joint Line    Negative   Lateral Joint Line   pos  Quadriceps    Negative  Hamstring     Negative            Range of  Motion:        Left knee:   10-50° **  Right knee:    0-140°      Patellofemoral examination:     Patella position    Centered  Crepitus (PF):    Absent   Lateral tilt:    Normal   J-sign:     None   Patellofemoral grind:   No pain       MENISCAL SIGNS: - unable to assess 2/2 pain    Pain on terminal extension:  Negative  Pain on " terminal flexion:  Negative  Serenas maneuver:  Negative     LIGAMENT EXAMINATION:  ACL / Lachman:  2+   PCL-Post.  drawer: normal 0 to 2mm  MCL- Valgus:  normal 0 to 2mm  LCL- Varus:    normal 0 to 2mm    Dial Test: difference c/w other side  At 30° flexion: normal (< 5°)   At 90° flexion: normal (< 5°)       STRENGTH: (* = with pain)   Quadricep   5/5  Hamstrin/5    EXTREMITY NEURO-VASCULAR EXAMINATION:   Sensation:  Grossly intact to light touch all dermatomal regions.   Motor Function:  Fully intact motor function at hip, knee, foot and ankle    DTRs;  quadriceps and  achilles 2+.  No clonus and downgoing Babinski.    Vascular status:  DP and PT pulses 2+, brisk capillary refill, symmetric.     Imaging:     Bilateral X-rays ordered/reviewed by me showing no evidence of fracture or dislocation. There is no obvious malalignment. No evidence of masses, lesions or foreign bodies. Patellofemoral joint space narrowing bilaterally.     MRI left knee:  Findings concerning for nondisplaced microtrabecular fracture of the anterior proximal tibia (possible insufficiency-type) which approaches the tibial plateau cortical margin without definite involvement.  Corresponding marrow edematous change.     Intrasubstance signal abnormality involving the anterior horn lateral meniscus, possible tear and/or contusion.     Small, nondisplaced tear of the posterior horn medial meniscus.     Chondral degenerative changes, most notable at the medial compartment.    Assessment & Plan    Acute medial meniscus tear, left, subsequent encounter    Other orders  -     traMADoL (ULTRAM) 50 mg tablet; Take 1 tablet (50 mg total) by mouth every 6 (six) hours as needed for Pain.  Dispense: 24 each; Refill: 0        I made the decision to obtain old records of the patient including previous notes and imaging. New imaging was ordered today of the extremity or extremities evaluated. I independently reviewed and interpreted the  radiographs and/or MRIs/CT scan today as well as prior imaging.    We discussed at length different treatment options including conservative vs surgical management. These include anti-inflammatories, acetaminophen, rest, ice, heat, formal physical therapy including strengthening and stretching exercises, home exercise programs, injections, dry needling, and finally surgical intervention.      Discussed case with supervising physician. Imaging reviewed by myself as well as supervising physician, Amado Rock MD. Recommendation of 6 weeks non-operative treatment, if no improvement consider subchrondroplasty     Gabapentin 300 mg TID for pain- increase per instructions given.   Tramadol rx sent for breakthrough pain  Hinge knee brace given, wear only when not showering or laying  Ice compress to the affected area 2-3x a day for 15-20 minutes as needed for pain management    Follow up: 6 weeks [facility]  X-rays next visit: none     All questions were answered and patient is agreeable to the above plan.

## 2023-02-03 ENCOUNTER — PATIENT MESSAGE (OUTPATIENT)
Dept: ORTHOPEDICS | Facility: CLINIC | Age: 53
End: 2023-02-03
Payer: MEDICAID

## 2023-02-09 ENCOUNTER — TELEPHONE (OUTPATIENT)
Dept: ORTHOPEDICS | Facility: CLINIC | Age: 53
End: 2023-02-09
Payer: MEDICAID

## 2023-02-09 NOTE — TELEPHONE ENCOUNTER
----- Message from Matt Forte sent at 2/9/2023  3:02 PM CST -----  Regarding: CALL BACK  Pt call to reschedule appt    Call

## 2023-02-16 ENCOUNTER — TELEPHONE (OUTPATIENT)
Dept: ORTHOPEDICS | Facility: CLINIC | Age: 53
End: 2023-02-16
Payer: MEDICAID

## 2023-02-23 ENCOUNTER — OFFICE VISIT (OUTPATIENT)
Dept: ORTHOPEDICS | Facility: CLINIC | Age: 53
End: 2023-02-23
Payer: MEDICAID

## 2023-02-23 VITALS
WEIGHT: 244.5 LBS | SYSTOLIC BLOOD PRESSURE: 140 MMHG | BODY MASS INDEX: 37.05 KG/M2 | HEIGHT: 68 IN | DIASTOLIC BLOOD PRESSURE: 92 MMHG | HEART RATE: 89 BPM

## 2023-02-23 DIAGNOSIS — S83.242D ACUTE MEDIAL MENISCUS TEAR, LEFT, SUBSEQUENT ENCOUNTER: Primary | ICD-10-CM

## 2023-02-23 PROCEDURE — 1159F PR MEDICATION LIST DOCUMENTED IN MEDICAL RECORD: ICD-10-PCS | Mod: CPTII,,,

## 2023-02-23 PROCEDURE — 3080F DIAST BP >= 90 MM HG: CPT | Mod: CPTII,,,

## 2023-02-23 PROCEDURE — 1159F MED LIST DOCD IN RCRD: CPT | Mod: CPTII,,,

## 2023-02-23 PROCEDURE — 3077F PR MOST RECENT SYSTOLIC BLOOD PRESSURE >= 140 MM HG: ICD-10-PCS | Mod: CPTII,,,

## 2023-02-23 PROCEDURE — 99215 OFFICE O/P EST HI 40 MIN: CPT | Mod: PBBFAC,PN

## 2023-02-23 PROCEDURE — 99999 PR PBB SHADOW E&M-EST. PATIENT-LVL V: ICD-10-PCS | Mod: PBBFAC,,,

## 2023-02-23 PROCEDURE — 3077F SYST BP >= 140 MM HG: CPT | Mod: CPTII,,,

## 2023-02-23 PROCEDURE — 99214 OFFICE O/P EST MOD 30 MIN: CPT | Mod: S$PBB,,,

## 2023-02-23 PROCEDURE — 99214 PR OFFICE/OUTPT VISIT, EST, LEVL IV, 30-39 MIN: ICD-10-PCS | Mod: S$PBB,,,

## 2023-02-23 PROCEDURE — 3080F PR MOST RECENT DIASTOLIC BLOOD PRESSURE >= 90 MM HG: ICD-10-PCS | Mod: CPTII,,,

## 2023-02-23 PROCEDURE — 3008F PR BODY MASS INDEX (BMI) DOCUMENTED: ICD-10-PCS | Mod: CPTII,,,

## 2023-02-23 PROCEDURE — 3008F BODY MASS INDEX DOCD: CPT | Mod: CPTII,,,

## 2023-02-23 PROCEDURE — 99999 PR PBB SHADOW E&M-EST. PATIENT-LVL V: CPT | Mod: PBBFAC,,,

## 2023-02-23 RX ORDER — METHOCARBAMOL 500 MG/1
500 TABLET, FILM COATED ORAL 2 TIMES DAILY
Qty: 20 TABLET | Refills: 0 | Status: SHIPPED | OUTPATIENT
Start: 2023-02-23 | End: 2023-03-05

## 2023-02-23 RX ORDER — CEFAZOLIN SODIUM 2 G/50ML
2 SOLUTION INTRAVENOUS
Status: CANCELLED | OUTPATIENT
Start: 2023-02-23

## 2023-02-23 RX ORDER — PANTOPRAZOLE SODIUM 40 MG/1
40 TABLET, DELAYED RELEASE ORAL 2 TIMES DAILY
COMMUNITY
Start: 2023-02-18 | End: 2023-05-18

## 2023-02-23 NOTE — PROGRESS NOTES
Patient ID: Gisela Monique is a 53 y.o. female    Results of the Left Knee    History of Present Illness:    Gisela Monique presents to clinic for bilateral knee pain L > R. Patient reports yesterday she fell in a hole and fell directly onto her left knee. She has been using voltaren gel, had gastric bypass so cannot take NSAIDs. The pain started 1 days ago and is becoming progressively worse.  Pain is located over (points to) anterior left knee. She reports that the pain is a 10 /10 sharp pain toda. Flexion and extension worsens pain. No history gout. The pain is affecting ADLs and limiting desired level of activity. Denies numbness, tingling, and inability to bear weight. She feels her pain is radiating up to her hip. Denies history of gout, but does endorse eating a lot of seafood.     Trial of voltaren with no improvement. S/p R and L knee arthroscopies, most recent, 5ish years with Dr. Vivar in [place].     Mechanical symptoms: +   Instability: +    Occupation: retired    Ambulating: unassisted  Diabetic: no  Smoking: no  Hx of DVT/PE: no     ____________________________________________________________________    Interval history 1/26/2023: Patient returns today for left knee MRI results. She states pain has worsened. Able to ambulate unassisted.     ____________________________________________________________________    Interval history 2/23/2023: Patient returns today for follow up of left knee pain. She continues to endorse mechanicals symptoms, is interested in pursuing surgical options. She is taking Tylenol and tramadol as needed, states she is taking gabapentin and has taken a little more than prescribed due to pain. She does not want an injection today and would like to proceed with surgery.       PAST MEDICAL HISTORY:   Past Medical History:   Diagnosis Date    Anxiety     Bipolar disorder, unspecified     Depression     Rheumatoid arthritis     Thyroid disease      PAST SURGICAL HISTORY:    Past Surgical History:   Procedure Laterality Date     SECTION      GASTRIC BYPASS      HYSTERECTOMY      KNEE CARTILAGE SURGERY Bilateral     x2 to Right, x1 to Left     FAMILY HISTORY:   Family History   Problem Relation Age of Onset    Breast cancer Maternal Grandmother     Colon cancer Neg Hx     Ovarian cancer Neg Hx      SOCIAL HISTORY:   Social History     Occupational History    Not on file   Tobacco Use    Smoking status: Never    Smokeless tobacco: Never   Substance and Sexual Activity    Alcohol use: Yes     Comment: occasionally    Drug use: Not Currently    Sexual activity: Not Currently     Partners: Male        MEDICATIONS:   Current Outpatient Medications:     buPROPion (WELLBUTRIN SR) 200 MG SR12, Take 200 mg by mouth., Disp: , Rfl:     buPROPion (WELLBUTRIN XL) 300 MG 24 hr tablet, Take 300 mg by mouth., Disp: , Rfl:     butalbital-acetaminophen-caffeine -40 mg (FIORICET, ESGIC) -40 mg per tablet, Take 1 tablet by mouth every 6 (six) hours as needed for Headaches. Label with sedative precautions, Disp: 20 tablet, Rfl: 0    cyanocobalamin 1,000 mcg/mL injection, Inject intramuscularly 1 ml every 4 weeks, Disp: 1 mL, Rfl: 11    dextroamphetamine-amphetamine 30 mg Tab, Take 1 tablet by mouth 2 (two) times daily., Disp: , Rfl:     diclofenac sodium (VOLTAREN) 1 % Gel, Apply 2 g topically 4 (four) times daily., Disp: 350 g, Rfl: 2    DULoxetine (CYMBALTA) 60 MG capsule, Take 60 mg by mouth once daily., Disp: , Rfl:     ergocalciferol (ERGOCALCIFEROL) 50,000 unit Cap, , Disp: , Rfl:     ferrous sulfate (FEOSOL) 325 mg (65 mg iron) Tab tablet, Take 325 mg by mouth., Disp: , Rfl:     gabapentin (NEURONTIN) 300 MG capsule, Take 1 capsule (300 mg total) by mouth 3 (three) times daily., Disp: 90 capsule, Rfl: 11    hydrOXYzine pamoate (VISTARIL) 25 MG Cap, TAKE ONE CAPSULE BY MOUTH THREE TIMES DAILY IF NEEDED FOR ANXIETY, Disp: , Rfl:     ibuprofen (ADVIL,MOTRIN) 800 MG tablet, Take 1  "tablet (800 mg total) by mouth every 6 (six) hours as needed for Pain., Disp: 20 tablet, Rfl: 0    insulin syringe-needle U-100 (BD INSULIN SYRINGE) 1 mL 26 x 1/2" Syrg, Use to give b12 shot intramuscularly, Disp: 15 each, Rfl: 1    levothyroxine (SYNTHROID) 50 MCG tablet, Take 50 mcg by mouth once daily., Disp: , Rfl:     LIDOcaine HCl 2% (XYLOCAINE) 2 % Soln, 5 mLs by Transmucosal route every 6 (six) hours as needed., Disp: , Rfl:     multivitamin-min-iron-FA-vit K (BARIATRIC MULTIVITAMINS) 45 mg iron- 800 mcg-120 mcg Cap, Take 1 capsule by mouth once daily., Disp: 30 capsule, Rfl: 11    mupirocin (BACTROBAN) 2 % ointment, Apply topically 3 (three) times daily., Disp: 30 g, Rfl: 0    ondansetron (ZOFRAN-ODT) 4 MG TbDL, Take 1 tablet (4 mg total) by mouth every 6 (six) hours as needed., Disp: 15 tablet, Rfl: 0    pantoprazole (PROTONIX) 40 MG tablet, Take 40 mg by mouth 2 (two) times daily., Disp: , Rfl:     syringe with needle 3 mL 22 x 1 1/2" Syrg, Use to inject B12 shots intramuscularly as directed., Disp: 16 each, Rfl: 1    traMADoL (ULTRAM) 50 mg tablet, Take 1 tablet (50 mg total) by mouth every 6 (six) hours as needed for Pain., Disp: 24 each, Rfl: 0  No current facility-administered medications for this visit.  ALLERGIES: Review of patient's allergies indicates:  No Known Allergies      Physical Exam     Vitals:    02/23/23 1124   BP: (!) 140/92   Pulse: 89         Alert and oriented to person, place and time. No acute distress. Well-groomed, not ill appearing. Pupils round and reactive, normal respiratory effort, no audible wheezing.     OBSERVATION / INSPECTION   Gait:   antalgic   Alignment:  Neutral   Scars:   None   Muscle atrophy: None  Effusion:  None   Warmth:  None   Discoloration:   None     TENDERNESS                 Patella     pos    Peripatellar medial    Negative   Peripatellar lateral   Negative   Patellar tendon   Negative   Quad tendon     Negative    Prepatellar " Bursa   Negative     Tibial tubercle    Negative    Pes anserine/HS   Negative      ITB     pos      LCL     Negative  MFC     Negative  LFC     Negative  MCL      Negative  Medial Joint Line    Negative   Lateral Joint Line   pos  Quadriceps    Negative  Hamstring     Negative            Range of  Motion:        Left knee:   10-60° *  Right knee:    0-140°      Patellofemoral examination:     Patella position    Centered  Crepitus (PF):    Absent   Lateral tilt:    Normal   J-sign:     None   Patellofemoral grind:   No pain       MENISCAL SIGNS: - unable to assess 2/2 pain    Pain on terminal extension:  Negative  Pain on terminal flexion:  Negative  Serenas maneuver:  Negative     LIGAMENT EXAMINATION:  ACL / Lachman:  2+   PCL-Post.  drawer: normal 0 to 2mm  MCL- Valgus:  normal 0 to 2mm  LCL- Varus:    normal 0 to 2mm    Dial Test: difference c/w other side  At 30° flexion: normal (< 5°)   At 90° flexion: normal (< 5°)       STRENGTH: (* = with pain)   Quadricep   5/5  Hamstrin/5    EXTREMITY NEURO-VASCULAR EXAMINATION:   Sensation:  Grossly intact to light touch all dermatomal regions.   Motor Function:  Fully intact motor function at hip, knee, foot and ankle    DTRs;  quadriceps and  achilles 2+.  No clonus and downgoing Babinski.    Vascular status:  DP and PT pulses 2+, brisk capillary refill, symmetric.     Imaging:     Bilateral X-rays ordered/reviewed by me showing no evidence of fracture or dislocation. There is no obvious malalignment. No evidence of masses, lesions or foreign bodies. Patellofemoral joint space narrowing bilaterally.     MRI left knee:  Findings concerning for nondisplaced microtrabecular fracture of the anterior proximal tibia (possible insufficiency-type) which approaches the tibial plateau cortical margin without definite involvement.  Corresponding marrow edematous change.     Intrasubstance signal abnormality involving the anterior horn lateral meniscus, possible tear  and/or contusion.     Small, nondisplaced tear of the posterior horn medial meniscus.     Chondral degenerative changes, most notable at the medial compartment.    Assessment & Plan    Acute medial meniscus tear, left, subsequent encounter      I made the decision to obtain old records of the patient including previous notes and imaging. New imaging was ordered today of the extremity or extremities evaluated. I independently reviewed and interpreted the radiographs and/or MRIs/CT scan today as well as prior imaging.    We discussed at length different treatment options including conservative vs surgical management. These include anti-inflammatories, acetaminophen, rest, ice, heat, formal physical therapy including strengthening and stretching exercises, home exercise programs, injections, dry needling, and finally surgical intervention.      Patient would like to proceed with surgical options. She will f/u with Pranav to meet him and for pre op. We will need clearance per PCP.     1. Imaging results reviewed today and discussed with Amado Rock MD. We reviewed with Gisela Madridasson today, the pathology and natural history of her diagnosis. We have discussed a variety of treatment options including medications, physical therapy and other alternative treatments. I also explained the indications, risks and benefits of surgery. After discussion, she decided to proceed with surgery. The decision was made to go forward with:  Left knee arthroscopy with subchrondoplasty      The details of the surgical procedure were explained, including the location of probable incisions and a description of likely hardware and/or grafts to be used. The patient understands the likely convalescence after surgery. Also, we have thoroughly discussed the risks, benefits and alternatives to surgery, including, but not limited to, the risk of infection, joint stiffness, blood clot (including DVT and/or pulmonary embolus), neurologic and  vascular injury.  It was explained that, if tissue has been repaired or reconstructed, there is a chance of failure, which may require further management.    I made the decision to obtain old records of the patient including previous notes and imaging. I independently reviewed and interpreted lab results today as well as prior imaging.     2. Ice compress to the affected area 2-3x a day for 15-20 minutes as needed for pain management.  3. Case request placed, plan for surgery on  3/27/2023  4. Patient does need pre-op clearance from PCP  5. Patient will return for pre-op appointment     All of the patient's questions were answered and the patient will contact us if they have any questions or concerns in the interim.    Follow up: 2 weeks [facility]  X-rays next visit: none     All questions were answered and patient is agreeable to the above plan.

## 2023-03-06 ENCOUNTER — OFFICE VISIT (OUTPATIENT)
Dept: ORTHOPEDICS | Facility: CLINIC | Age: 53
End: 2023-03-06
Payer: MEDICAID

## 2023-03-06 VITALS
SYSTOLIC BLOOD PRESSURE: 121 MMHG | HEIGHT: 68 IN | DIASTOLIC BLOOD PRESSURE: 81 MMHG | WEIGHT: 244.94 LBS | RESPIRATION RATE: 18 BRPM | BODY MASS INDEX: 37.12 KG/M2 | HEART RATE: 102 BPM

## 2023-03-06 DIAGNOSIS — S83.282D ACUTE LATERAL MENISCUS TEAR OF LEFT KNEE, SUBSEQUENT ENCOUNTER: ICD-10-CM

## 2023-03-06 DIAGNOSIS — S83.242D ACUTE MEDIAL MENISCUS TEAR, LEFT, SUBSEQUENT ENCOUNTER: ICD-10-CM

## 2023-03-06 DIAGNOSIS — S82.132A CLOSED FRACTURE OF MEDIAL PORTION OF LEFT TIBIAL PLATEAU, INITIAL ENCOUNTER: ICD-10-CM

## 2023-03-06 DIAGNOSIS — Z01.818 PRE-OPERATIVE EXAMINATION: Primary | ICD-10-CM

## 2023-03-06 PROCEDURE — 99215 PR OFFICE/OUTPT VISIT, EST, LEVL V, 40-54 MIN: ICD-10-PCS | Mod: S$PBB,,, | Performed by: ORTHOPAEDIC SURGERY

## 2023-03-06 PROCEDURE — 3008F BODY MASS INDEX DOCD: CPT | Mod: CPTII,,, | Performed by: ORTHOPAEDIC SURGERY

## 2023-03-06 PROCEDURE — 3074F PR MOST RECENT SYSTOLIC BLOOD PRESSURE < 130 MM HG: ICD-10-PCS | Mod: CPTII,,, | Performed by: ORTHOPAEDIC SURGERY

## 2023-03-06 PROCEDURE — 99214 OFFICE O/P EST MOD 30 MIN: CPT | Mod: PBBFAC,PN | Performed by: ORTHOPAEDIC SURGERY

## 2023-03-06 PROCEDURE — 3079F PR MOST RECENT DIASTOLIC BLOOD PRESSURE 80-89 MM HG: ICD-10-PCS | Mod: CPTII,,, | Performed by: ORTHOPAEDIC SURGERY

## 2023-03-06 PROCEDURE — 1159F PR MEDICATION LIST DOCUMENTED IN MEDICAL RECORD: ICD-10-PCS | Mod: CPTII,,, | Performed by: ORTHOPAEDIC SURGERY

## 2023-03-06 PROCEDURE — 1159F MED LIST DOCD IN RCRD: CPT | Mod: CPTII,,, | Performed by: ORTHOPAEDIC SURGERY

## 2023-03-06 PROCEDURE — 3074F SYST BP LT 130 MM HG: CPT | Mod: CPTII,,, | Performed by: ORTHOPAEDIC SURGERY

## 2023-03-06 PROCEDURE — 99999 PR PBB SHADOW E&M-EST. PATIENT-LVL IV: CPT | Mod: PBBFAC,,, | Performed by: ORTHOPAEDIC SURGERY

## 2023-03-06 PROCEDURE — 3079F DIAST BP 80-89 MM HG: CPT | Mod: CPTII,,, | Performed by: ORTHOPAEDIC SURGERY

## 2023-03-06 PROCEDURE — 3008F PR BODY MASS INDEX (BMI) DOCUMENTED: ICD-10-PCS | Mod: CPTII,,, | Performed by: ORTHOPAEDIC SURGERY

## 2023-03-06 PROCEDURE — 99215 OFFICE O/P EST HI 40 MIN: CPT | Mod: S$PBB,,, | Performed by: ORTHOPAEDIC SURGERY

## 2023-03-06 PROCEDURE — 99999 PR PBB SHADOW E&M-EST. PATIENT-LVL IV: ICD-10-PCS | Mod: PBBFAC,,, | Performed by: ORTHOPAEDIC SURGERY

## 2023-03-06 NOTE — PROGRESS NOTES
Patient ID: Gisela Monique is a 53 y.o. female    Pain and Follow-up of the Left Knee      History of Present Illness:    Gisela Monique presents to clinic for bilateral knee pain L > R. Patient reports yesterday she fell in a hole and fell directly onto her left knee. She has been using voltaren gel, had gastric bypass so cannot take NSAIDs. The pain started 1 days ago and is becoming progressively worse.  Pain is located over (points to) anterior left knee. She reports that the pain is a 10 /10 sharp pain today. Flexion and extension worsens pain. No history gout. The pain is affecting ADLs and limiting desired level of activity. Denies numbness, tingling, and inability to bear weight. She feels her pain is radiating up to her hip. Denies history of gout, but does endorse eating a lot of seafood.     Trial of voltaren with no improvement. S/p R and L knee arthroscopies, most recent, 5ish years with Dr. Vivar in [place].     Mechanical symptoms: +   Instability: +    Occupation: retired    Ambulating: unassisted  Diabetic: no  Smoking: no  Hx of DVT/PE: no     ____________________________________________________________________    Interval history 2023: Patient returns for re-evaluation of her knee and preoperative appointment.  She previously saw Aisha for left knee pain after a traumatic injury to her left knee.  She has persistent pain mostly in the medial aspect of her knee as well as instability.      PAST MEDICAL HISTORY:   Past Medical History:   Diagnosis Date    Anxiety     Bipolar disorder, unspecified     Depression     Rheumatoid arthritis     Thyroid disease      PAST SURGICAL HISTORY:   Past Surgical History:   Procedure Laterality Date     SECTION      GASTRIC BYPASS      HYSTERECTOMY      KNEE CARTILAGE SURGERY Bilateral     x2 to Right, x1 to Left     FAMILY HISTORY:   Family History   Problem Relation Age of Onset    Breast cancer Maternal Grandmother     Colon cancer  "Neg Hx     Ovarian cancer Neg Hx      SOCIAL HISTORY:   Social History     Occupational History    Not on file   Tobacco Use    Smoking status: Never    Smokeless tobacco: Never   Substance and Sexual Activity    Alcohol use: Yes     Comment: occasionally    Drug use: Not Currently    Sexual activity: Not Currently     Partners: Male        MEDICATIONS:   Current Outpatient Medications:     buPROPion (WELLBUTRIN XL) 300 MG 24 hr tablet, Take 300 mg by mouth., Disp: , Rfl:     cyanocobalamin 1,000 mcg/mL injection, Inject intramuscularly 1 ml every 4 weeks, Disp: 1 mL, Rfl: 11    dextroamphetamine-amphetamine 30 mg Tab, Take 1 tablet by mouth 2 (two) times daily., Disp: , Rfl:     diclofenac sodium (VOLTAREN) 1 % Gel, Apply 2 g topically 4 (four) times daily., Disp: 350 g, Rfl: 2    DULoxetine (CYMBALTA) 60 MG capsule, Take 60 mg by mouth once daily., Disp: , Rfl:     ergocalciferol (ERGOCALCIFEROL) 50,000 unit Cap, , Disp: , Rfl:     ferrous sulfate (FEOSOL) 325 mg (65 mg iron) Tab tablet, Take 325 mg by mouth., Disp: , Rfl:     gabapentin (NEURONTIN) 300 MG capsule, Take 1 capsule (300 mg total) by mouth 3 (three) times daily., Disp: 90 capsule, Rfl: 11    ibuprofen (ADVIL,MOTRIN) 800 MG tablet, Take 1 tablet (800 mg total) by mouth every 6 (six) hours as needed for Pain., Disp: 20 tablet, Rfl: 0    insulin syringe-needle U-100 (BD INSULIN SYRINGE) 1 mL 26 x 1/2" Syrg, Use to give b12 shot intramuscularly, Disp: 15 each, Rfl: 1    levothyroxine (SYNTHROID) 50 MCG tablet, Take 50 mcg by mouth once daily., Disp: , Rfl:     multivitamin-min-iron-FA-vit K (BARIATRIC MULTIVITAMINS) 45 mg iron- 800 mcg-120 mcg Cap, Take 1 capsule by mouth once daily., Disp: 30 capsule, Rfl: 11    pantoprazole (PROTONIX) 40 MG tablet, Take 40 mg by mouth 2 (two) times daily., Disp: , Rfl:     syringe with needle 3 mL 22 x 1 1/2" Syrg, Use to inject B12 shots intramuscularly as directed., Disp: 16 each, Rfl: 1    buPROPion (WELLBUTRIN SR) " 200 MG SR12, Take 200 mg by mouth., Disp: , Rfl:     butalbital-acetaminophen-caffeine -40 mg (FIORICET, ESGIC) -40 mg per tablet, Take 1 tablet by mouth every 6 (six) hours as needed for Headaches. Label with sedative precautions (Patient not taking: Reported on 3/6/2023), Disp: 20 tablet, Rfl: 0    hydrOXYzine pamoate (VISTARIL) 25 MG Cap, TAKE ONE CAPSULE BY MOUTH THREE TIMES DAILY IF NEEDED FOR ANXIETY, Disp: , Rfl:     LIDOcaine HCl 2% (XYLOCAINE) 2 % Soln, 5 mLs by Transmucosal route every 6 (six) hours as needed., Disp: , Rfl:     mupirocin (BACTROBAN) 2 % ointment, Apply topically 3 (three) times daily. (Patient not taking: Reported on 3/6/2023), Disp: 30 g, Rfl: 0    ondansetron (ZOFRAN-ODT) 4 MG TbDL, Take 1 tablet (4 mg total) by mouth every 6 (six) hours as needed. (Patient not taking: Reported on 3/6/2023), Disp: 15 tablet, Rfl: 0    traMADoL (ULTRAM) 50 mg tablet, Take 1 tablet (50 mg total) by mouth every 6 (six) hours as needed for Pain. (Patient not taking: Reported on 3/6/2023), Disp: 24 each, Rfl: 0  ALLERGIES: Review of patient's allergies indicates:  No Known Allergies      Physical Exam     Vitals:    03/06/23 1017   BP: 121/81   Pulse: 102   Resp: 18       Alert and oriented to person, place and time. No acute distress. Well-groomed, not ill appearing. Pupils round and reactive, normal respiratory effort, no audible wheezing.     OBSERVATION / INSPECTION   Gait:   antalgic   Alignment:  Neutral   Scars:   None   Muscle atrophy: None  Effusion:  None   Warmth:  None   Discoloration:   None     TENDERNESS                 Patella     +    Peripatellar medial    Negative   Peripatellar lateral   Negative   Patellar tendon   Negative   Quad tendon     Negative    Prepatellar Bursa   Negative     Tibial tubercle    Negative    Pes anserine/HS   +      ITB     +      LCL     Negative  MFC     Negative  LFC     Negative  MCL      Negative  Medial Joint Line    +   Lateral Joint  Line   +  Quadriceps    Negative  Hamstring     Negative            Range of  Motion:        Left knee:   5-120° **  Right knee:    0-140°      Patellofemoral examination:     Patella position    Centered  Crepitus (PF):    Absent   Lateral tilt:    Normal   J-sign:     None   Patellofemoral grind:   No pain       MENISCAL SIGNS:   Pain on terminal extension:  +  Pain on terminal flexion:  +  Serenas maneuver:  +     LIGAMENT EXAMINATION:  ACL / Lachman:  2+   PCL-Post.  drawer: normal 0 to 2mm  MCL- Valgus:  normal 0 to 2mm  LCL- Varus:    normal 0 to 2mm    Dial Test: difference c/w other side  At 30° flexion: normal (< 5°)   At 90° flexion: normal (< 5°)       STRENGTH: (* = with pain)   Quadricep   5/5  Hamstrin/5    EXTREMITY NEURO-VASCULAR EXAMINATION:   Sensation:  Grossly intact to light touch all dermatomal regions.   Motor Function:  Fully intact motor function at hip, knee, foot and ankle    DTRs;  quadriceps and  achilles 2+.  No clonus and downgoing Babinski.    Vascular status:  DP and PT pulses 2+, brisk capillary refill, symmetric.     Imaging:     Bilateral X-rays ordered/reviewed by me showing no evidence of fracture or dislocation. There is no obvious malalignment. No evidence of masses, lesions or foreign bodies. Patellofemoral joint space narrowing bilaterally.     MRI left knee:  Findings concerning for nondisplaced microtrabecular fracture of the anterior proximal tibia (possible insufficiency-type) which approaches the tibial plateau cortical margin without definite involvement.  Corresponding marrow edematous change.     Intrasubstance signal abnormality involving the anterior horn lateral meniscus, possible tear and/or contusion.     Small, nondisplaced tear of the posterior horn medial meniscus.     Chondral degenerative changes, most notable at the medial compartment.    Assessment & Plan    Pre-operative examination  -     SCHEDULED EKG 12-LEAD (to Muse); Future  -     X-Ray  Chest 1 View Pre-OP; Future; Expected date: 03/06/2023  -     BASIC METABOLIC PANEL; Future; Expected date: 03/06/2023  -     CBC Auto Differential; Future; Expected date: 03/06/2023    Acute medial meniscus tear, left, subsequent encounter    Acute lateral meniscus tear of left knee, subsequent encounter    Closed fracture of medial portion of left tibial plateau, initial encounter        We discussed treatment options in detail.  She has failed conservative treatment with bracing and therapy.  She has left knee pain which is not improving.  MRI consistent with medial and lateral meniscus tears as well as edema and subchondral insufficiency fracture of the medial tibial plateau.  We discussed options for this including subchondroplasty of the medial tibial plateau as well as possible medial and lateral meniscus repair versus debridement and indicated procedures.  We discussed the rehabilitation associated with surgery as well as the risks and expectations of the surgery.  Despite the risks she elected to proceed.    _________________________________________________________________      Diagnosis and findings were discussed with her in lay terms. I discussed the findings and treatment options with them at length. Questions were actively sought and all questions were answered to their apparent satisfaction. We discussed the risks and benefits of surgery. We reviewed the operative indications surgical technique and potential rehabilitation involved. Risks including, but not limited to pain, bleeding, infection, damage to surrounding neurovascular structures, and other soft tissues, decreased range of motion, instability, poor cosmetic result, scarring/painful scars, poor functional result, reflex sympathetic dystrophy. We discussed as well the risk of anesthesia, DVT/PE and possible need for additional surgical intervention in lay terms. Despite the risks as explained to them, they wished to proceed with surgery. She  expressed understanding and agreement with the treatment plan and understand that no guarantee of outcome is implied or expressed given.       Gisela Monique will be scheduled for the following procedure(s):     Left knee arthroscopy with indicated procedures including possible medial/lateral meniscus repair versus debridement  Possible chondroplasty left knee  Possible subchondroplasty left knee medial tibial plateau      Post-Op Medications to be prescribed:   Percocet 5/325mg Take 1-2 tablets every 4-6 hours PRN pain #28  Zofran 4mg oral disintegrating tablets every 8 hours PRN nausea/vomiting   ASA 81mg twice daily x 2 weeks  Motrin 600 mg TID PRN     DME/Bracing:  None  Post-op Physical Therapy to begin:  Immediately postop    Medical Clearance: No  Hx of DVT,PE, anesthetic complications: No    Additional notes/concerns:     Opioid Disclosure  Today we discussed the reasons why the prescription is necessary as well as: the risks of addiction and overdose associated with opioid drugs and the dangers of taking opioid drugs with alcohol, benzodiazepines and other central nervous system depressants; alternative treatments that may be available; the risks associated with the use of the drugs being prescribed, specifically that opioids are highly addictive, even when taken as prescribed, that there is a risk of developing a physical or psychological dependence on the controlled dangerous substance, and that the risks of taking more opioids than prescribed or mixing sedatives, benzodiazepines or alcohol with opioids can result in fatal respiratory depression.

## 2023-03-25 ENCOUNTER — PATIENT MESSAGE (OUTPATIENT)
Dept: ORTHOPEDICS | Facility: CLINIC | Age: 53
End: 2023-03-25
Payer: MEDICAID

## 2023-03-28 ENCOUNTER — TELEPHONE (OUTPATIENT)
Dept: ORTHOPEDICS | Facility: CLINIC | Age: 53
End: 2023-03-28
Payer: MEDICAID

## 2023-03-28 NOTE — TELEPHONE ENCOUNTER
Pt is one day s/p Left Knee Chondroplasty and reports severe pain to anterior knee and posterior knee. She denies having any discoloration, cramping pain, warmth, or increased swelling in operative leg. She also denies having any chest pain, SOB, or h/o DVT's. Pt states she has been taking Percocet 5-325mg q4hrs and an occasional Ibuprofen for relief, which have not been effective. Pt advised to continue elevating leg above the level of her heart (stack pillows under foot), ice application in 20-30 minute intervals, and to take a regular strength Tylenol and Ibuprofen along with the Percocet every 6 hrs as well as rest leg to see if this helps and follow up with us tomorrow to let us know if she is feeling any better. Pt does understand that post operative pain is expected a day after surgery, and she is aware that if any of the above discussed symptoms present to seek immediate medical attention in the ER.

## 2023-04-11 ENCOUNTER — OFFICE VISIT (OUTPATIENT)
Dept: ORTHOPEDICS | Facility: CLINIC | Age: 53
End: 2023-04-11
Payer: MEDICAID

## 2023-04-11 VITALS
DIASTOLIC BLOOD PRESSURE: 86 MMHG | BODY MASS INDEX: 35.68 KG/M2 | WEIGHT: 238.13 LBS | HEART RATE: 102 BPM | SYSTOLIC BLOOD PRESSURE: 143 MMHG

## 2023-04-11 DIAGNOSIS — G89.18 POST-OP PAIN: ICD-10-CM

## 2023-04-11 DIAGNOSIS — S83.242D ACUTE MEDIAL MENISCUS TEAR, LEFT, SUBSEQUENT ENCOUNTER: Primary | ICD-10-CM

## 2023-04-11 PROCEDURE — 99999 PR PBB SHADOW E&M-EST. PATIENT-LVL III: ICD-10-PCS | Mod: PBBFAC,,,

## 2023-04-11 PROCEDURE — 1159F PR MEDICATION LIST DOCUMENTED IN MEDICAL RECORD: ICD-10-PCS | Mod: CPTII,,,

## 2023-04-11 PROCEDURE — 3008F BODY MASS INDEX DOCD: CPT | Mod: CPTII,,,

## 2023-04-11 PROCEDURE — 3079F PR MOST RECENT DIASTOLIC BLOOD PRESSURE 80-89 MM HG: ICD-10-PCS | Mod: CPTII,,,

## 2023-04-11 PROCEDURE — 99213 OFFICE O/P EST LOW 20 MIN: CPT | Mod: PBBFAC,PN

## 2023-04-11 PROCEDURE — 3008F PR BODY MASS INDEX (BMI) DOCUMENTED: ICD-10-PCS | Mod: CPTII,,,

## 2023-04-11 PROCEDURE — 99024 PR POST-OP FOLLOW-UP VISIT: ICD-10-PCS | Mod: ,,,

## 2023-04-11 PROCEDURE — 99999 PR PBB SHADOW E&M-EST. PATIENT-LVL III: CPT | Mod: PBBFAC,,,

## 2023-04-11 PROCEDURE — 99024 POSTOP FOLLOW-UP VISIT: CPT | Mod: ,,,

## 2023-04-11 PROCEDURE — 1159F MED LIST DOCD IN RCRD: CPT | Mod: CPTII,,,

## 2023-04-11 PROCEDURE — 3079F DIAST BP 80-89 MM HG: CPT | Mod: CPTII,,,

## 2023-04-11 PROCEDURE — 3077F SYST BP >= 140 MM HG: CPT | Mod: CPTII,,,

## 2023-04-11 PROCEDURE — 3077F PR MOST RECENT SYSTOLIC BLOOD PRESSURE >= 140 MM HG: ICD-10-PCS | Mod: CPTII,,,

## 2023-04-11 RX ORDER — ONDANSETRON 4 MG/1
4 TABLET, ORALLY DISINTEGRATING ORAL 2 TIMES DAILY
Qty: 15 TABLET | Refills: 0 | OUTPATIENT
Start: 2023-04-11 | End: 2023-12-22

## 2023-04-11 RX ORDER — OXYCODONE AND ACETAMINOPHEN 5; 325 MG/1; MG/1
1 TABLET ORAL EVERY 4 HOURS PRN
Qty: 28 EACH | Refills: 0 | Status: SHIPPED | OUTPATIENT
Start: 2023-04-11 | End: 2023-05-18

## 2023-04-11 NOTE — PROGRESS NOTES
Post-op Note    HPI    Gisela Monique is here 2 weeks s/p the following procedure:     3/27/2023  Arthroscopically aided treatment of tibial fracture, proximal plateau, unicondylar   Left knee arthroscopy with medial meniscus debridement  Left knee arthroscopy with chondroplasty of the medial femoral condyle and trochlea    Overall doing well. Pain controlled on current regimen. She is currently enrolled in Physical Therapy. Denies any chest pain or shortness of breathe. Denies any drainage from the incision. Denies any fevers, chills or paresthesias. Requesting refill of percocet and zofran today.     DVT Prophylaxis: none      Physical Exam:     Patient is alert and oriented no acute distress.   Assistive Device: none, WBAT    Left knee: sutures removed. Incision(s) are well healed.  There is no evidence of dehiscence.  There is no induration erythema or signs of infection.  Appropriate soft tissue swelling.  Compartments are soft and compressible.  Warm well-perfused extremity. ROM 0-110.       Assessment    Gisela Monique is 2 weeks Post-op     Plan:    Overall doing as expected.  We discussed expectations of surgery and postoperative course.     Pain: Continued postoperative pain regimen -- Percocet refill and zofran refill sent, instructed patient on opioid use.   DVT prophylaxis: N/A  PT/OT: Continue/Initiate physical therapy (weight bearing status: WBAT), continue PT     Follow-up: 4 weeks [facility]   X-rays next visit: none

## 2023-05-15 ENCOUNTER — OFFICE VISIT (OUTPATIENT)
Dept: ORTHOPEDICS | Facility: CLINIC | Age: 53
End: 2023-05-15
Payer: MEDICAID

## 2023-05-15 VITALS — BODY MASS INDEX: 35.25 KG/M2 | HEIGHT: 69 IN | WEIGHT: 238 LBS | OXYGEN SATURATION: 98 %

## 2023-05-15 DIAGNOSIS — G89.18 POST-OP PAIN: Primary | ICD-10-CM

## 2023-05-15 DIAGNOSIS — S83.242D ACUTE MEDIAL MENISCUS TEAR, LEFT, SUBSEQUENT ENCOUNTER: ICD-10-CM

## 2023-05-15 PROCEDURE — 3008F BODY MASS INDEX DOCD: CPT | Mod: CPTII,,, | Performed by: ORTHOPAEDIC SURGERY

## 2023-05-15 PROCEDURE — 99999 PR PBB SHADOW E&M-EST. PATIENT-LVL III: CPT | Mod: PBBFAC,,, | Performed by: ORTHOPAEDIC SURGERY

## 2023-05-15 PROCEDURE — 1159F PR MEDICATION LIST DOCUMENTED IN MEDICAL RECORD: ICD-10-PCS | Mod: CPTII,,, | Performed by: ORTHOPAEDIC SURGERY

## 2023-05-15 PROCEDURE — 99213 OFFICE O/P EST LOW 20 MIN: CPT | Mod: PBBFAC,PN | Performed by: ORTHOPAEDIC SURGERY

## 2023-05-15 PROCEDURE — 99024 POSTOP FOLLOW-UP VISIT: CPT | Mod: ,,, | Performed by: ORTHOPAEDIC SURGERY

## 2023-05-15 PROCEDURE — 99024 PR POST-OP FOLLOW-UP VISIT: ICD-10-PCS | Mod: ,,, | Performed by: ORTHOPAEDIC SURGERY

## 2023-05-15 PROCEDURE — 3008F PR BODY MASS INDEX (BMI) DOCUMENTED: ICD-10-PCS | Mod: CPTII,,, | Performed by: ORTHOPAEDIC SURGERY

## 2023-05-15 PROCEDURE — 1159F MED LIST DOCD IN RCRD: CPT | Mod: CPTII,,, | Performed by: ORTHOPAEDIC SURGERY

## 2023-05-15 PROCEDURE — 99999 PR PBB SHADOW E&M-EST. PATIENT-LVL III: ICD-10-PCS | Mod: PBBFAC,,, | Performed by: ORTHOPAEDIC SURGERY

## 2023-05-15 RX ORDER — HYDROXYZINE PAMOATE 50 MG/1
50 CAPSULE ORAL NIGHTLY PRN
COMMUNITY
Start: 2023-04-26

## 2023-05-15 RX ORDER — TRAMADOL HYDROCHLORIDE 50 MG/1
50 TABLET ORAL EVERY 6 HOURS
Qty: 28 TABLET | Refills: 0 | Status: SHIPPED | OUTPATIENT
Start: 2023-05-15 | End: 2023-05-18

## 2023-05-15 RX ORDER — DEXTROAMPHETAMINE SACCHARATE, AMPHETAMINE ASPARTATE, DEXTROAMPHETAMINE SULFATE AND AMPHETAMINE SULFATE 7.5; 7.5; 7.5; 7.5 MG/1; MG/1; MG/1; MG/1
1 TABLET ORAL 2 TIMES DAILY
COMMUNITY
Start: 2023-04-26 | End: 2023-05-18

## 2023-05-15 NOTE — PROGRESS NOTES
Post-op Note    HPI    Gisela Monique is here 6 weeks s/p the following procedure:     3/27/2023  Arthroscopically aided treatment of tibial fracture, proximal plateau, unicondylar   Left knee arthroscopy with medial meniscus debridement  Left knee arthroscopy with chondroplasty of the medial femoral condyle and trochlea    Overall doing well. Pain controlled on current regimen. She is currently enrolled in Physical Therapy.  Feels that she is getting better.  Still reports pain medially but this is improved since preop.    Physical Exam:     Patient is alert and oriented no acute distress.   Assistive Device: none, WBAT    Left knee: sutures removed. Incision(s) are well healed.  There is no evidence of dehiscence.  There is no induration erythema or signs of infection.  Appropriate soft tissue swelling.  Compartments are soft and compressible.  Warm well-perfused extremity. ROM 0-130.       Assessment    Gisela Monique is 6 weeks Post-op     Plan:    Overall doing as expected.  We discussed expectations of surgery and postoperative course.     Pain: Continued postoperative pain regimen -- tramadol, last prescription  DVT prophylaxis: N/A  PT/OT: Continue/Initiate physical therapy (weight bearing status: WBAT), continue PT     Follow-up:  Patient will schedule according to  postop visit

## 2023-06-13 ENCOUNTER — TELEPHONE (OUTPATIENT)
Dept: ORTHOPEDICS | Facility: CLINIC | Age: 53
End: 2023-06-13
Payer: MEDICAID

## 2023-06-13 NOTE — TELEPHONE ENCOUNTER
----- Message from Octavia Elizabeth sent at 6/13/2023  9:28 AM CDT -----  Contact: Patient, [phone]  Calling to schedule her 3rd post op appointment. Please call her. Thanks.

## 2023-07-07 ENCOUNTER — TELEPHONE (OUTPATIENT)
Dept: ORTHOPEDICS | Facility: CLINIC | Age: 53
End: 2023-07-07
Payer: MEDICAID

## 2023-07-07 NOTE — TELEPHONE ENCOUNTER
----- Message from Kasandra Kenyon sent at 7/7/2023 12:02 PM CDT -----  Regarding: Reschedule Appt  Contact: Pt [phone]  Pt is calling to reschedule appt for 7/11 please call

## 2023-07-31 ENCOUNTER — OFFICE VISIT (OUTPATIENT)
Dept: ORTHOPEDICS | Facility: CLINIC | Age: 53
End: 2023-07-31
Payer: MEDICAID

## 2023-07-31 VITALS
WEIGHT: 234.69 LBS | HEIGHT: 68 IN | HEART RATE: 69 BPM | BODY MASS INDEX: 35.57 KG/M2 | DIASTOLIC BLOOD PRESSURE: 90 MMHG | SYSTOLIC BLOOD PRESSURE: 142 MMHG

## 2023-07-31 DIAGNOSIS — S82.132S CLOSED FRACTURE OF MEDIAL PORTION OF LEFT TIBIAL PLATEAU, SEQUELA: ICD-10-CM

## 2023-07-31 DIAGNOSIS — S83.242D ACUTE MEDIAL MENISCUS TEAR, LEFT, SUBSEQUENT ENCOUNTER: ICD-10-CM

## 2023-07-31 DIAGNOSIS — M17.12 PRIMARY OSTEOARTHRITIS OF LEFT KNEE: Primary | ICD-10-CM

## 2023-07-31 DIAGNOSIS — G89.18 POST-OP PAIN: ICD-10-CM

## 2023-07-31 PROCEDURE — 99213 OFFICE O/P EST LOW 20 MIN: CPT | Mod: S$PBB,,,

## 2023-07-31 PROCEDURE — 99213 PR OFFICE/OUTPT VISIT, EST, LEVL III, 20-29 MIN: ICD-10-PCS | Mod: S$PBB,,,

## 2023-07-31 PROCEDURE — 3008F PR BODY MASS INDEX (BMI) DOCUMENTED: ICD-10-PCS | Mod: CPTII,,,

## 2023-07-31 PROCEDURE — 99999 PR PBB SHADOW E&M-EST. PATIENT-LVL V: CPT | Mod: PBBFAC,,,

## 2023-07-31 PROCEDURE — 1159F PR MEDICATION LIST DOCUMENTED IN MEDICAL RECORD: ICD-10-PCS | Mod: CPTII,,,

## 2023-07-31 PROCEDURE — 3077F PR MOST RECENT SYSTOLIC BLOOD PRESSURE >= 140 MM HG: ICD-10-PCS | Mod: CPTII,,,

## 2023-07-31 PROCEDURE — 3080F PR MOST RECENT DIASTOLIC BLOOD PRESSURE >= 90 MM HG: ICD-10-PCS | Mod: CPTII,,,

## 2023-07-31 PROCEDURE — 1159F MED LIST DOCD IN RCRD: CPT | Mod: CPTII,,,

## 2023-07-31 PROCEDURE — 3080F DIAST BP >= 90 MM HG: CPT | Mod: CPTII,,,

## 2023-07-31 PROCEDURE — 99999 PR PBB SHADOW E&M-EST. PATIENT-LVL V: ICD-10-PCS | Mod: PBBFAC,,,

## 2023-07-31 PROCEDURE — 99215 OFFICE O/P EST HI 40 MIN: CPT | Mod: PBBFAC,PN

## 2023-07-31 PROCEDURE — 3077F SYST BP >= 140 MM HG: CPT | Mod: CPTII,,,

## 2023-07-31 PROCEDURE — 3008F BODY MASS INDEX DOCD: CPT | Mod: CPTII,,,

## 2023-07-31 RX ORDER — DICLOFENAC SODIUM 10 MG/G
2 GEL TOPICAL 4 TIMES DAILY
Qty: 350 G | Refills: 2 | Status: SHIPPED | OUTPATIENT
Start: 2023-07-31

## 2023-07-31 NOTE — PROGRESS NOTES
Post-op Note    HPI    Gisela Monique is here 4 months s/p the following procedure:     3/27/2023  Arthroscopically aided treatment of tibial fracture, proximal plateau, unicondylar (CPT 62399)  Left knee arthroscopy with medial meniscus debridement  Left knee arthroscopy with chondroplasty of the medial femoral condyle and trochlea    Feels she is having 10/10 pain today. She is having medial sided knee pain. Reports she only went to therapy one time. Denies any chest pain or shortness of breathe. Denies any drainage from the incision. Denies any fevers, chills or paresthesias.     Imaging:    Xrays bilateral knee show patelloframoal chondromalacia and left medial compartment narrowing. Kellgren Javi grade 2     Physical Exam:     Patient is alert and oriented no acute distress.   Assistive Device: none    Right knee: moderate swelling. Incision(s) are well healed.  There is no evidence of dehiscence.  There is no induration erythema or signs of infection.  Appropriate soft tissue swelling.  Compartments are soft and compressible.  Warm well-perfused extremity. *. Medial tenderness.     Assessment    Gisela Monique is 4 months Post-op     Plan:    Overall doing as expected.  We discussed expectations of surgery and postoperative course.     Pain: Continued postoperative pain regimen -- tylenol prn, voltaren gel 2-3x daily  PT/OT: Continue/Initiate physical therapy (weight bearing status: WBAT), pt did not adequately attend PT in the post op period. We discussed importance of this today. We will set her up at [facility]. Order placed. We will also trial durolane as pre-op xrays showed mild medial compartment narrowing, kellgren Javi grade 3.     Medical Necessity for viscosupplementation use: After thorough evaluation of the patient, I have determined that viscosupplementation treatment is medically necessary. 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 Oral analgesics including NSAIDs have not adequately controlled the patient's symptoms. There is radiographic evidence of Kellgren-Javi grade II (or greater) osteoarthritic (OA) changes, or if lack of radiographic evidence, there is arthroscopic or other evidence of chondrosis of the knee(s).     Pre-authorization placed for  Durolane  injections.  Ice compress to the affected area 2-3x a day for 15-20 minutes as needed for pain management  RTC to see Aisha juarez PA-C for Durolane injections.    Follow-up: Durolane  X-rays next visit: none    All of the patient's questions were answered and the patient will contact us if they have any questions or concerns in the interim.

## 2023-08-16 ENCOUNTER — PATIENT MESSAGE (OUTPATIENT)
Dept: HEMATOLOGY/ONCOLOGY | Facility: CLINIC | Age: 53
End: 2023-08-16
Payer: MEDICAID

## 2023-09-13 PROBLEM — L01.00 IMPETIGO: Status: ACTIVE | Noted: 2023-09-13

## 2023-09-13 PROBLEM — L30.9 DERMATITIS: Status: ACTIVE | Noted: 2023-09-13

## 2023-09-18 ENCOUNTER — PATIENT MESSAGE (OUTPATIENT)
Dept: PRIMARY CARE CLINIC | Facility: CLINIC | Age: 53
End: 2023-09-18
Payer: MEDICAID

## 2023-09-19 ENCOUNTER — OFFICE VISIT (OUTPATIENT)
Dept: HEMATOLOGY/ONCOLOGY | Facility: CLINIC | Age: 53
End: 2023-09-19
Payer: MEDICAID

## 2023-09-19 VITALS
HEART RATE: 93 BPM | RESPIRATION RATE: 16 BRPM | DIASTOLIC BLOOD PRESSURE: 69 MMHG | SYSTOLIC BLOOD PRESSURE: 113 MMHG | WEIGHT: 238.13 LBS | HEIGHT: 68 IN | OXYGEN SATURATION: 95 % | BODY MASS INDEX: 36.09 KG/M2 | TEMPERATURE: 98 F

## 2023-09-19 DIAGNOSIS — E53.8 B12 DEFICIENCY: ICD-10-CM

## 2023-09-19 DIAGNOSIS — E60 LOW ZINC LEVEL: ICD-10-CM

## 2023-09-19 DIAGNOSIS — D53.9 NUTRITIONAL ANEMIA: Primary | ICD-10-CM

## 2023-09-19 DIAGNOSIS — Z87.19 HISTORY OF GASTROINTESTINAL BLEEDING: ICD-10-CM

## 2023-09-19 DIAGNOSIS — D50.0 IRON DEFICIENCY ANEMIA DUE TO CHRONIC BLOOD LOSS: ICD-10-CM

## 2023-09-19 DIAGNOSIS — Z98.890 STATUS POST GASTRIC SURGERY: ICD-10-CM

## 2023-09-19 PROCEDURE — 99214 OFFICE O/P EST MOD 30 MIN: CPT | Mod: PBBFAC | Performed by: STUDENT IN AN ORGANIZED HEALTH CARE EDUCATION/TRAINING PROGRAM

## 2023-09-19 PROCEDURE — 3074F PR MOST RECENT SYSTOLIC BLOOD PRESSURE < 130 MM HG: ICD-10-PCS | Mod: CPTII,,, | Performed by: STUDENT IN AN ORGANIZED HEALTH CARE EDUCATION/TRAINING PROGRAM

## 2023-09-19 PROCEDURE — 1160F PR REVIEW ALL MEDS BY PRESCRIBER/CLIN PHARMACIST DOCUMENTED: ICD-10-PCS | Mod: CPTII,,, | Performed by: STUDENT IN AN ORGANIZED HEALTH CARE EDUCATION/TRAINING PROGRAM

## 2023-09-19 PROCEDURE — 3078F DIAST BP <80 MM HG: CPT | Mod: CPTII,,, | Performed by: STUDENT IN AN ORGANIZED HEALTH CARE EDUCATION/TRAINING PROGRAM

## 2023-09-19 PROCEDURE — 3008F PR BODY MASS INDEX (BMI) DOCUMENTED: ICD-10-PCS | Mod: CPTII,,, | Performed by: STUDENT IN AN ORGANIZED HEALTH CARE EDUCATION/TRAINING PROGRAM

## 2023-09-19 PROCEDURE — 99215 OFFICE O/P EST HI 40 MIN: CPT | Mod: S$PBB,,, | Performed by: STUDENT IN AN ORGANIZED HEALTH CARE EDUCATION/TRAINING PROGRAM

## 2023-09-19 PROCEDURE — 1160F RVW MEDS BY RX/DR IN RCRD: CPT | Mod: CPTII,,, | Performed by: STUDENT IN AN ORGANIZED HEALTH CARE EDUCATION/TRAINING PROGRAM

## 2023-09-19 PROCEDURE — 3074F SYST BP LT 130 MM HG: CPT | Mod: CPTII,,, | Performed by: STUDENT IN AN ORGANIZED HEALTH CARE EDUCATION/TRAINING PROGRAM

## 2023-09-19 PROCEDURE — 99215 PR OFFICE/OUTPT VISIT, EST, LEVL V, 40-54 MIN: ICD-10-PCS | Mod: S$PBB,,, | Performed by: STUDENT IN AN ORGANIZED HEALTH CARE EDUCATION/TRAINING PROGRAM

## 2023-09-19 PROCEDURE — 99999 PR PBB SHADOW E&M-EST. PATIENT-LVL IV: CPT | Mod: PBBFAC,,, | Performed by: STUDENT IN AN ORGANIZED HEALTH CARE EDUCATION/TRAINING PROGRAM

## 2023-09-19 PROCEDURE — 3008F BODY MASS INDEX DOCD: CPT | Mod: CPTII,,, | Performed by: STUDENT IN AN ORGANIZED HEALTH CARE EDUCATION/TRAINING PROGRAM

## 2023-09-19 PROCEDURE — 3078F PR MOST RECENT DIASTOLIC BLOOD PRESSURE < 80 MM HG: ICD-10-PCS | Mod: CPTII,,, | Performed by: STUDENT IN AN ORGANIZED HEALTH CARE EDUCATION/TRAINING PROGRAM

## 2023-09-19 PROCEDURE — 1159F PR MEDICATION LIST DOCUMENTED IN MEDICAL RECORD: ICD-10-PCS | Mod: CPTII,,, | Performed by: STUDENT IN AN ORGANIZED HEALTH CARE EDUCATION/TRAINING PROGRAM

## 2023-09-19 PROCEDURE — 99999 PR PBB SHADOW E&M-EST. PATIENT-LVL IV: ICD-10-PCS | Mod: PBBFAC,,, | Performed by: STUDENT IN AN ORGANIZED HEALTH CARE EDUCATION/TRAINING PROGRAM

## 2023-09-19 PROCEDURE — 1159F MED LIST DOCD IN RCRD: CPT | Mod: CPTII,,, | Performed by: STUDENT IN AN ORGANIZED HEALTH CARE EDUCATION/TRAINING PROGRAM

## 2023-09-19 RX ORDER — SYRINGE AND NEEDLE,INSULIN,1ML 25GX1"
SYRINGE, EMPTY DISPOSABLE MISCELLANEOUS
Qty: 15 EACH | Refills: 1 | Status: SHIPPED | OUTPATIENT
Start: 2023-09-19

## 2023-09-19 RX ORDER — CYANOCOBALAMIN 1000 UG/ML
INJECTION, SOLUTION INTRAMUSCULAR; SUBCUTANEOUS
Qty: 16 ML | Refills: 0 | Status: SHIPPED | OUTPATIENT
Start: 2023-09-19 | End: 2024-10-16

## 2023-09-19 RX ORDER — MUPIROCIN 20 MG/G
OINTMENT TOPICAL 3 TIMES DAILY
Status: ON HOLD | COMMUNITY
End: 2024-01-03 | Stop reason: ALTCHOICE

## 2023-09-20 ENCOUNTER — TELEPHONE (OUTPATIENT)
Dept: OBSTETRICS AND GYNECOLOGY | Facility: CLINIC | Age: 53
End: 2023-09-20
Payer: MEDICAID

## 2023-09-20 NOTE — TELEPHONE ENCOUNTER
Spoke with pt and offered her appt with Yimi on 9/26, she accepted. Pt VU    ----- Message from Kristie Arreola sent at 9/20/2023  4:03 PM CDT -----   Name of Who is Calling:     What is the request in detail:  patient request call back in reference to schedule annual /wwe and mammogram order Please contact to further discuss and advise   ( was unable to schedule due to no dates available )     Can the clinic reply by [facility]:     What Number to Call Back if not in [facility]:  [phone]

## 2023-09-20 NOTE — PROGRESS NOTES
Hematology- Oncology Clinic Note :       RFV / chief complaint- Nutritional anemia        HPI  Pt is a 53 y.o. female who  has a past medical history of Anxiety, Bipolar disorder, unspecified, Depression, Rheumatoid arthritis, and Thyroid disease.   Pt presents to the clinic today for anemia     Pt has a history of partial hysterectomy due to uterine cancer and gastric bypass. Was hospitalized in 2021 with GI bleed with EGD and CTA abd/pelvis negative for active site of bleed. Was transfused 2 units of pRBC and discharged as bleeding had stabilized. Denies tobacco use but does have 3-4 glasses of wine per week.       Continues to feel tired and low energy/ lethargic   Takes multivitamin   Not taking b12 shots.   Here with         Reviewed past medical/surgical/social history    Past Medical History:   Diagnosis Date    Anxiety     Bipolar disorder, unspecified     Depression     Rheumatoid arthritis     Thyroid disease       Past Surgical History:   Procedure Laterality Date    ARTHROSCOPIC CHONDROPLASTY OF KNEE JOINT Left 3/27/2023    Procedure: ARTHROSCOPY, KNEE, WITH CHONDROPLASTY;  Surgeon: Amado Rock MD;  Location: [facility];  Service: Orthopedics;  Laterality: Left;  [employer] video   audra suchondroplasty   c-arm     SECTION      GASTRIC BYPASS      HYSTERECTOMY      KNEE ARTHROSCOPY Left 2023    with chondroplasty    KNEE CARTILAGE SURGERY Bilateral     x2 to Right, x1 to Left      Review of patient's allergies indicates:  No Known Allergies   Social History     Tobacco Use    Smoking status: Never    Smokeless tobacco: Never   Substance Use Topics    Alcohol use: Yes     Comment: occasionally      Family History   Problem Relation Age of Onset    Breast cancer Maternal Grandmother     Colon cancer Neg Hx     Ovarian cancer Neg Hx           Review of Systems :  Review of Systems   Constitutional:  Positive for malaise/fatigue. Negative for chills, diaphoresis, fever and  "weight loss.   HENT: Negative.  Negative for congestion, hearing loss, nosebleeds, sore throat and tinnitus.    Eyes: Negative.  Negative for blurred vision and discharge.   Respiratory:  Negative for cough, hemoptysis, sputum production, shortness of breath and wheezing.    Cardiovascular: Negative.  Negative for chest pain, palpitations and leg swelling.   Gastrointestinal: Negative.  Negative for abdominal pain, blood in stool, constipation, diarrhea, heartburn, melena, nausea and vomiting.   Genitourinary: Negative.    Musculoskeletal: Negative.  Negative for back pain, falls, joint pain and myalgias.   Skin: Negative.  Negative for itching and rash.   Neurological: Negative.  Negative for dizziness, tingling, sensory change, speech change, focal weakness, seizures, loss of consciousness, weakness and headaches.   Endo/Heme/Allergies: Negative.  Does not bruise/bleed easily.   Psychiatric/Behavioral:  Positive for depression. The patient is nervous/anxious. The patient does not have insomnia.                Physical Exam :  /69 (BP Location: Left arm, Patient Position: Sitting, BP Method: Medium (Automatic))   Pulse 93   Temp 98.3 °F (36.8 °C) (Oral)   Resp 16   Ht 5' 8" (1.727 m)   Wt 108 kg (238 lb 1.6 oz)   SpO2 95%   BMI 36.20 kg/m²   Wt Readings from Last 3 Encounters:   09/19/23 108 kg (238 lb 1.6 oz)   09/13/23 107.5 kg (236 lb 15.9 oz)   08/31/23 107.2 kg (236 lb 5.3 oz)       Body mass index is 36.2 kg/m².      Physical Exam  Vitals and nursing note reviewed.   Constitutional:       General: She is not in acute distress.     Appearance: Normal appearance. She is not ill-appearing.   HENT:      Head: Normocephalic and atraumatic.      Right Ear: External ear normal.      Left Ear: External ear normal.      Mouth/Throat:      Pharynx: No oropharyngeal exudate.   Eyes:      General: No scleral icterus.        Right eye: No discharge.         Left eye: No discharge.   Cardiovascular:      Rate " "and Rhythm: Normal rate.   Pulmonary:      Effort: Pulmonary effort is normal. No respiratory distress.   Abdominal:      General: There is no distension.   Musculoskeletal:         General: No swelling or deformity.      Cervical back: Normal range of motion and neck supple.      Right lower leg: No edema.      Left lower leg: No edema.   Skin:     Coloration: Skin is not jaundiced.      Findings: No bruising, erythema, lesion or rash.   Neurological:      General: No focal deficit present.      Mental Status: She is alert and oriented to person, place, and time. Mental status is at baseline.      Coordination: Coordination normal.      Gait: Gait normal.   Psychiatric:         Mood and Affect: Mood normal.         Behavior: Behavior normal.             Current Outpatient Medications   Medication Sig Dispense Refill    buPROPion (WELLBUTRIN XL) 300 MG 24 hr tablet Take 300 mg by mouth.      diclofenac sodium (VOLTAREN) 1 % Gel Apply 2 g topically 4 (four) times daily. 350 g 2    DULoxetine (CYMBALTA) 60 MG capsule Take 60 mg by mouth once daily.      gabapentin (NEURONTIN) 300 MG capsule Take 1 capsule (300 mg total) by mouth 3 (three) times daily. 90 capsule 11    hydrOXYzine pamoate (VISTARIL) 50 MG Cap Take 50 mg by mouth nightly as needed.      levothyroxine (SYNTHROID) 50 MCG tablet Take 50 mcg by mouth once daily.      multivitamin-min-iron-FA-vit K (BARIATRIC MULTIVITAMINS) 45 mg iron- 800 mcg-120 mcg Cap Take 1 capsule by mouth once daily. 30 capsule 11    mupirocin (BACTROBAN) 2 % ointment Apply topically 3 (three) times daily.      pantoprazole (PROTONIX) 40 MG tablet Take 1 tablet (40 mg total) by mouth once daily. 30 tablet 11    syringe with needle 3 mL 22 x 1 1/2" Syrg Use to inject B12 shots intramuscularly as directed. 16 each 1    aspirin (ECOTRIN) 81 MG EC tablet Take 1 tablet (81 mg total) by mouth 2 (two) times daily. (Patient not taking: Reported on 9/19/2023) 28 tablet 0    cyanocobalamin " "1,000 mcg/mL injection Inject 1 mL (1,000 mcg total) into the muscle once a week for 28 days, THEN 1 mL (1,000 mcg total) every 30 days. 16 mL 0    ergocalciferol (ERGOCALCIFEROL) 50,000 unit Cap       famotidine (PEPCID) 20 MG tablet Take 1 tablet (20 mg total) by mouth 2 (two) times daily. for 5 days 10 tablet 0    ferrous sulfate (FEOSOL) 325 mg (65 mg iron) Tab tablet Take 325 mg by mouth.      HYDROcodone-acetaminophen (NORCO) 5-325 mg per tablet Take 1 tablet by mouth every 6 (six) hours as needed for Pain. The medication you have been prescribed may cause drowsiness and impair your judgement.  Therefore, you should avoid driving, climbing, using machinery, etc., so as not to increase your risk of injury.  Do NOT drink any alcohol while on this medication(s). It is also addictive. (Patient not taking: Reported on 9/19/2023) 10 tablet 0    ibuprofen (ADVIL,MOTRIN) 600 MG tablet TAKE ONE TABLET BY MOUTH THREE TIMES DAILY (Patient not taking: Reported on 9/19/2023) 60 tablet 1    ibuprofen (ADVIL,MOTRIN) 800 MG tablet Take 1 tablet (800 mg total) by mouth every 6 (six) hours as needed for Pain. (Patient not taking: Reported on 9/19/2023) 20 tablet 0    insulin syringe-needle U-100 (BD INSULIN SYRINGE) 1 mL 26 x 1/2" Syrg Use to give b12 shot intramuscularly 15 each 1    ondansetron (ZOFRAN) 4 MG tablet Take 1 tablet (4 mg total) by mouth every 6 (six) hours. (Patient not taking: Reported on 9/19/2023) 12 tablet 0    ondansetron (ZOFRAN-ODT) 4 MG TbDL Take 1 tablet (4 mg total) by mouth every 6 (six) hours as needed. (Patient not taking: Reported on 9/19/2023) 15 tablet 0    ondansetron (ZOFRAN-ODT) 4 MG TbDL Take 1 tablet (4 mg total) by mouth 2 (two) times daily. (Patient not taking: Reported on 9/19/2023) 15 tablet 0    oxyCODONE-acetaminophen (PERCOCET) 5-325 mg per tablet Take 1 tablet by mouth every 6 (six) hours as needed for Pain. (Patient not taking: Reported on 9/19/2023) 12 tablet 0     No current " facility-administered medications for this visit.       Pertinent Diagnostic studies:      No visits with results within 1 Week(s) from this visit.   Latest known visit with results is:   Lab Visit on 08/16/2023   Component Date Value Ref Range Status    WBC 08/16/2023 3.65 (L)  3.90 - 12.70 K/uL Final    RBC 08/16/2023 3.64 (L)  4.00 - 5.40 M/uL Final    Hemoglobin 08/16/2023 10.0 (L)  12.0 - 16.0 g/dL Final    Hematocrit 08/16/2023 32.8 (L)  37.0 - 48.5 % Final    MCV 08/16/2023 90  82 - 98 fL Final    MCH 08/16/2023 27.5  27.0 - 31.0 pg Final    MCHC 08/16/2023 30.5 (L)  32.0 - 36.0 g/dL Final    RDW 08/16/2023 19.2 (H)  11.5 - 14.5 % Final    Platelets 08/16/2023 277  150 - 450 K/uL Final    MPV 08/16/2023 9.9  9.2 - 12.9 fL Final    Immature Granulocytes 08/16/2023 0.0  0.0 - 0.5 % Final    Gran # (ANC) 08/16/2023 2.0  1.8 - 7.7 K/uL Final    Immature Grans (Abs) 08/16/2023 0.00  0.00 - 0.04 K/uL Final    Comment: Mild elevation in immature granulocytes is non specific and   can be seen in a variety of conditions including stress response,   acute inflammation, trauma and pregnancy. Correlation with other   laboratory and clinical findings is essential.      Lymph # 08/16/2023 1.2  1.0 - 4.8 K/uL Final    Mono # 08/16/2023 0.3  0.3 - 1.0 K/uL Final    Eos # 08/16/2023 0.1  0.0 - 0.5 K/uL Final    Baso # 08/16/2023 0.06  0.00 - 0.20 K/uL Final    nRBC 08/16/2023 0  0 /100 WBC Final    Gran % 08/16/2023 54.3  38.0 - 73.0 % Final    Lymph % 08/16/2023 33.2  18.0 - 48.0 % Final    Mono % 08/16/2023 9.0  4.0 - 15.0 % Final    Eosinophil % 08/16/2023 1.9  0.0 - 8.0 % Final    Basophil % 08/16/2023 1.6  0.0 - 1.9 % Final    Differential Method 08/16/2023 Automated   Final    Ferritin 08/16/2023 10 (L)  20.0 - 300.0 ng/mL Final    Copper 08/16/2023 1052  810 - 1990 ug/L Final    Comment: Copper values may be elevated to twice the normal   levels in pregnancy.      Elevated results may be due to sample collected in a    non-certified trace element-free tube.     This test was developed and the performance   characteristics determined by [facility].   It has not been cleared or approved by the FDA.   The laboratory is regulated under CLIA as qualified to   perform high-complexity testing. This test is used for   patient testing purposes. It should not be regarded   as investigational or for research.    Test performed at [facility],  [address] [place]  [address]     [phone]  Monisha Ralph MD, PhD - Medical Director      Zinc 08/16/2023 58 (L)  60 - 130 ug/dL Final    Comment: Elevated results may be due to sample collected in a   non-certified trace element-free tube.     This test was developed and the performance   characteristics determined by [facility].   It has not been cleared or approved by the FDA.   The laboratory is regulated under CLIA as qualified to   perform high-complexity testing. This test is used for   patient testing purposes. It should not be regarded   as investigational or for research.    Test performed at [facility],  [address] [place]  [address]     [phone]  Monisha Ralph MD, PhD - Medical Director      Vitamin B-12 08/16/2023 283  210 - 950 pg/mL Final    Methlymalonic Acid 08/16/2023 0.17  <0.40 umol/L Final    Comment: If applicable, any drug confirmation testing reported  here was developed and the performance characteristics  determined by [facility]. This   confirmation testing has not been cleared or approved  by the FDA. The laboratory is regulated under CLIA as  qualified to perform high-complexity testing. This test  is used for patient testing purposes. It should not be  regarded as investigational or for research.    Test performed at [facility],  [address] [place]  [address]     [phone]  Monisha Ralph MD, PhD - Medical Director      Iron 08/16/2023 32   30 - 160 ug/dL Final    Transferrin 08/16/2023 420 (H)  200 - 375 mg/dL Final    TIBC 08/16/2023 622 (H)  250 - 450 ug/dL Final    Saturated Iron 08/16/2023 5 (L)  20 - 50 % Final    Folate 08/16/2023 7.7  4.0 - 24.0 ng/mL Final           Oncology History    No history exists.     Assessment :       1. Nutritional anemia    2. Iron deficiency anemia due to chronic blood loss    3. B12 deficiency    4. Status post gastric surgery    5. History of gastrointestinal bleeding        Plan :       Iron def anemia due to chronic blood loss . Hx of Gi bleeding req transfusion. Hx of gastric bypass   CTA abd/pelvis and EGD did not reveal source, will need colonoscopy and possibly may require capsule endoscopy  one dose of injectafer given on 2/17/22 .  Pt will continue to f/u with gi Dr[facility] Osuna [facility]  Continue iron rich diet and bariatric multivitamin   Parenteral b12 monthly shot for life- refilled     Lasb reviewed with pt. Iron low, anemia hb 10. Plan for injectafer x 2.     Discussed side effects of Injectafer (or venofer) which include but are not limited to infusion reaction, shortness of breath, hives, rash, flushing, itching, headaches, skin discoloration at the injection site, nausea, vomiting, low phosphorus level, elevated blood pressure, elevated liver enzymes, risks to the unborn baby if pregnant. Patient understands the risks and agrees with proceeding with Injectafer (or venofer).     Depression- pt advised to see therapist and f/u with psychiatry    Electronically signed by Jerri Garsia    [facility]      Future Appointments   Date Time Provider Department Center   9/27/2023  1:20 PM [facility]   9/28/2023  1:20 PM [facility]       I spent >40 mins on reviewing epic chart notes, reviewing tests, nursing concerns,obtaining history, performing physical exam, counseling and educating  patient/family/caregiver, documentation, independently interpreting results and discussing them with patient/family/caregiver, care coordination, ordering medications/ tests/ procedures and referring and communicating with other health care professionals.         This note was created with voice recognition software.  Grammatical, syntax and spelling errors may be inevitable.

## 2023-10-02 ENCOUNTER — TELEPHONE (OUTPATIENT)
Dept: INFUSION THERAPY | Facility: OTHER | Age: 53
End: 2023-10-02
Payer: MEDICAID

## 2023-10-03 ENCOUNTER — PATIENT MESSAGE (OUTPATIENT)
Dept: INFUSION THERAPY | Facility: OTHER | Age: 53
End: 2023-10-03
Payer: MEDICAID

## 2023-10-06 ENCOUNTER — TELEPHONE (OUTPATIENT)
Dept: PRIMARY CARE CLINIC | Facility: CLINIC | Age: 53
End: 2023-10-06
Payer: MEDICAID

## 2023-10-06 NOTE — TELEPHONE ENCOUNTER
----- Message from Yola Batista sent at 10/6/2023  3:30 PM CDT -----  Contact: Pt [phone]  Patient would like to get a referral.  Referral to what specialty: rheumatoid arthritis doctor   Does the patient want the referral with a specific physician:  no  [facility]  Reason (be specific):  Swelling/Fluid/unable to release fluid  Does the patient already have the specialty clinic appointment scheduled:  no  If yes, what date is the appointment scheduled:     Is the insurance listed in Epic correct? (this is important for a referral):  yes  Advised patient that once provider approves this either a nurse or  will return their call?:   Would the patient like a call back, or a response through their [facility] portal?:   Call back  Comments:      Please call and advise.    Thank You

## 2023-10-06 NOTE — TELEPHONE ENCOUNTER
----- Message from Yola Batista sent at 10/6/2023  3:30 PM CDT -----  Contact: Pt [phone]  Patient would like to get a referral.  Referral to what specialty: rheumatoid arthritis doctor   Does the patient want the referral with a specific physician:  no  [facility]  Reason (be specific):  Swelling/Fluid/unable to release fluid  Does the patient already have the specialty clinic appointment scheduled:  no  If yes, what date is the appointment scheduled:     Is the insurance listed in Epic correct? (this is important for a referral):  yes  Advised patient that once provider approves this either a nurse or  will return their call?:   Would the patient like a call back, or a response through their [facility] portal?:   Call back  Comments:      Please call and advise.    Thank You

## 2023-10-06 NOTE — TELEPHONE ENCOUNTER
Called patient regarding message. Patient was schedule for an appointment on 10/18. Patient verbalized understand.

## 2023-10-09 ENCOUNTER — INFUSION (OUTPATIENT)
Dept: INFUSION THERAPY | Facility: OTHER | Age: 53
End: 2023-10-09
Attending: INTERNAL MEDICINE
Payer: MEDICAID

## 2023-10-09 ENCOUNTER — PATIENT MESSAGE (OUTPATIENT)
Dept: INFUSION THERAPY | Facility: OTHER | Age: 53
End: 2023-10-09

## 2023-10-09 VITALS
RESPIRATION RATE: 17 BRPM | DIASTOLIC BLOOD PRESSURE: 79 MMHG | SYSTOLIC BLOOD PRESSURE: 176 MMHG | OXYGEN SATURATION: 100 % | TEMPERATURE: 99 F | HEART RATE: 89 BPM

## 2023-10-09 DIAGNOSIS — D50.0 IRON DEFICIENCY ANEMIA DUE TO CHRONIC BLOOD LOSS: Primary | ICD-10-CM

## 2023-10-09 PROCEDURE — 96372 THER/PROPH/DIAG INJ SC/IM: CPT | Mod: 59

## 2023-10-09 PROCEDURE — 63600175 PHARM REV CODE 636 W HCPCS: Performed by: STUDENT IN AN ORGANIZED HEALTH CARE EDUCATION/TRAINING PROGRAM

## 2023-10-09 PROCEDURE — 25000003 PHARM REV CODE 250: Performed by: STUDENT IN AN ORGANIZED HEALTH CARE EDUCATION/TRAINING PROGRAM

## 2023-10-09 PROCEDURE — 96365 THER/PROPH/DIAG IV INF INIT: CPT

## 2023-10-09 RX ORDER — CYANOCOBALAMIN 1000 UG/ML
1000 INJECTION, SOLUTION INTRAMUSCULAR; SUBCUTANEOUS
Status: COMPLETED | OUTPATIENT
Start: 2023-10-09 | End: 2023-10-09

## 2023-10-09 RX ORDER — DIPHENHYDRAMINE HYDROCHLORIDE 50 MG/ML
50 INJECTION INTRAMUSCULAR; INTRAVENOUS ONCE AS NEEDED
Status: CANCELLED | OUTPATIENT
Start: 2023-10-16

## 2023-10-09 RX ORDER — DIPHENHYDRAMINE HYDROCHLORIDE 50 MG/ML
50 INJECTION INTRAMUSCULAR; INTRAVENOUS ONCE AS NEEDED
Status: DISCONTINUED | OUTPATIENT
Start: 2023-10-09 | End: 2023-10-09 | Stop reason: HOSPADM

## 2023-10-09 RX ORDER — EPINEPHRINE 0.3 MG/.3ML
0.3 INJECTION SUBCUTANEOUS ONCE AS NEEDED
Status: CANCELLED | OUTPATIENT
Start: 2023-10-16

## 2023-10-09 RX ORDER — SODIUM CHLORIDE 0.9 % (FLUSH) 0.9 %
10 SYRINGE (ML) INJECTION
Status: CANCELLED | OUTPATIENT
Start: 2023-10-16

## 2023-10-09 RX ORDER — HEPARIN 100 UNIT/ML
5 SYRINGE INTRAVENOUS
Status: DISCONTINUED | OUTPATIENT
Start: 2023-10-09 | End: 2023-10-09 | Stop reason: HOSPADM

## 2023-10-09 RX ORDER — HEPARIN 100 UNIT/ML
5 SYRINGE INTRAVENOUS
Status: CANCELLED | OUTPATIENT
Start: 2023-10-16

## 2023-10-09 RX ORDER — SODIUM CHLORIDE 9 MG/ML
INJECTION, SOLUTION INTRAVENOUS CONTINUOUS
Status: DISCONTINUED | OUTPATIENT
Start: 2023-10-09 | End: 2023-10-09 | Stop reason: HOSPADM

## 2023-10-09 RX ORDER — SODIUM CHLORIDE 9 MG/ML
INJECTION, SOLUTION INTRAVENOUS CONTINUOUS
Status: CANCELLED | OUTPATIENT
Start: 2023-10-16

## 2023-10-09 RX ORDER — EPINEPHRINE 0.3 MG/.3ML
0.3 INJECTION SUBCUTANEOUS ONCE AS NEEDED
Status: DISCONTINUED | OUTPATIENT
Start: 2023-10-09 | End: 2023-10-09 | Stop reason: HOSPADM

## 2023-10-09 RX ORDER — SODIUM CHLORIDE 0.9 % (FLUSH) 0.9 %
10 SYRINGE (ML) INJECTION
Status: DISCONTINUED | OUTPATIENT
Start: 2023-10-09 | End: 2023-10-09 | Stop reason: HOSPADM

## 2023-10-09 RX ADMIN — FERRIC CARBOXYMALTOSE INJECTION 750 MG: 50 INJECTION, SOLUTION INTRAVENOUS at 01:10

## 2023-10-09 RX ADMIN — SODIUM CHLORIDE: 9 INJECTION, SOLUTION INTRAVENOUS at 01:10

## 2023-10-09 RX ADMIN — CYANOCOBALAMIN 1000 MCG: 1000 INJECTION INTRAMUSCULAR; SUBCUTANEOUS at 02:10

## 2023-10-09 NOTE — PLAN OF CARE
Patient tolerated her Vit B-12 IM injection to the left arm. Tolerated her iron infusion of Injectafer. .REports no discomfort. VSS. NAD. Discussed discharge instructions. Verbalized understanding. No other questions. Patient discharged to home per spouse.

## 2023-10-16 ENCOUNTER — INFUSION (OUTPATIENT)
Dept: INFUSION THERAPY | Facility: OTHER | Age: 53
End: 2023-10-16
Attending: INTERNAL MEDICINE
Payer: MEDICAID

## 2023-10-16 VITALS
OXYGEN SATURATION: 98 % | HEART RATE: 101 BPM | DIASTOLIC BLOOD PRESSURE: 97 MMHG | TEMPERATURE: 99 F | SYSTOLIC BLOOD PRESSURE: 142 MMHG | RESPIRATION RATE: 16 BRPM

## 2023-10-16 DIAGNOSIS — D50.0 IRON DEFICIENCY ANEMIA DUE TO CHRONIC BLOOD LOSS: Primary | ICD-10-CM

## 2023-10-16 PROCEDURE — 63600175 PHARM REV CODE 636 W HCPCS: Mod: JZ,JG | Performed by: STUDENT IN AN ORGANIZED HEALTH CARE EDUCATION/TRAINING PROGRAM

## 2023-10-16 PROCEDURE — 96365 THER/PROPH/DIAG IV INF INIT: CPT

## 2023-10-16 PROCEDURE — 25000003 PHARM REV CODE 250: Performed by: STUDENT IN AN ORGANIZED HEALTH CARE EDUCATION/TRAINING PROGRAM

## 2023-10-16 RX ORDER — HEPARIN 100 UNIT/ML
5 SYRINGE INTRAVENOUS
Status: DISCONTINUED | OUTPATIENT
Start: 2023-10-16 | End: 2023-10-16 | Stop reason: HOSPADM

## 2023-10-16 RX ORDER — DIPHENHYDRAMINE HYDROCHLORIDE 50 MG/ML
50 INJECTION INTRAMUSCULAR; INTRAVENOUS ONCE AS NEEDED
OUTPATIENT
Start: 2023-10-16

## 2023-10-16 RX ORDER — SODIUM CHLORIDE 0.9 % (FLUSH) 0.9 %
10 SYRINGE (ML) INJECTION
Status: DISCONTINUED | OUTPATIENT
Start: 2023-10-16 | End: 2023-10-16 | Stop reason: HOSPADM

## 2023-10-16 RX ORDER — SODIUM CHLORIDE 9 MG/ML
INJECTION, SOLUTION INTRAVENOUS CONTINUOUS
Status: CANCELLED | OUTPATIENT
Start: 2023-10-16

## 2023-10-16 RX ORDER — SODIUM CHLORIDE 9 MG/ML
INJECTION, SOLUTION INTRAVENOUS CONTINUOUS
Status: DISCONTINUED | OUTPATIENT
Start: 2023-10-16 | End: 2023-10-16 | Stop reason: HOSPADM

## 2023-10-16 RX ORDER — HEPARIN 100 UNIT/ML
5 SYRINGE INTRAVENOUS
Status: CANCELLED | OUTPATIENT
Start: 2023-10-16

## 2023-10-16 RX ORDER — DIPHENHYDRAMINE HYDROCHLORIDE 50 MG/ML
50 INJECTION INTRAMUSCULAR; INTRAVENOUS ONCE AS NEEDED
Status: DISCONTINUED | OUTPATIENT
Start: 2023-10-16 | End: 2023-10-16 | Stop reason: HOSPADM

## 2023-10-16 RX ORDER — SODIUM CHLORIDE 0.9 % (FLUSH) 0.9 %
10 SYRINGE (ML) INJECTION
Status: CANCELLED | OUTPATIENT
Start: 2023-10-16

## 2023-10-16 RX ORDER — EPINEPHRINE 0.3 MG/.3ML
0.3 INJECTION SUBCUTANEOUS ONCE AS NEEDED
OUTPATIENT
Start: 2023-10-16

## 2023-10-16 RX ORDER — EPINEPHRINE 0.3 MG/.3ML
0.3 INJECTION SUBCUTANEOUS ONCE AS NEEDED
Status: DISCONTINUED | OUTPATIENT
Start: 2023-10-16 | End: 2023-10-16 | Stop reason: HOSPADM

## 2023-10-16 RX ADMIN — SODIUM CHLORIDE: 9 INJECTION, SOLUTION INTRAVENOUS at 12:10

## 2023-10-16 RX ADMIN — FERRIC CARBOXYMALTOSE INJECTION 750 MG: 50 INJECTION, SOLUTION INTRAVENOUS at 12:10

## 2023-10-16 NOTE — PLAN OF CARE
Injectafer infusion complete. Pt tolerated well. VSS. NAD. IV DC'd. Pt verbalized understanding of discharge instructions before leaving.

## 2023-11-29 ENCOUNTER — OFFICE VISIT (OUTPATIENT)
Dept: OBSTETRICS AND GYNECOLOGY | Facility: CLINIC | Age: 53
End: 2023-11-29
Payer: MEDICAID

## 2023-11-29 ENCOUNTER — TELEPHONE (OUTPATIENT)
Dept: OBSTETRICS AND GYNECOLOGY | Facility: CLINIC | Age: 53
End: 2023-11-29
Payer: MEDICAID

## 2023-11-29 VITALS
SYSTOLIC BLOOD PRESSURE: 125 MMHG | DIASTOLIC BLOOD PRESSURE: 80 MMHG | BODY MASS INDEX: 34.43 KG/M2 | WEIGHT: 227.19 LBS | HEART RATE: 83 BPM | HEIGHT: 68 IN

## 2023-11-29 DIAGNOSIS — R30.0 DYSURIA: ICD-10-CM

## 2023-11-29 DIAGNOSIS — N95.2 VAGINAL ATROPHY: ICD-10-CM

## 2023-11-29 DIAGNOSIS — Z01.419 ENCOUNTER FOR WELL WOMAN EXAM WITH ROUTINE GYNECOLOGICAL EXAM: Primary | ICD-10-CM

## 2023-11-29 DIAGNOSIS — Z12.31 ENCOUNTER FOR SCREENING MAMMOGRAM FOR MALIGNANT NEOPLASM OF BREAST: ICD-10-CM

## 2023-11-29 DIAGNOSIS — N95.2 ATROPHIC VAGINITIS: ICD-10-CM

## 2023-11-29 LAB
BILIRUB SERPL-MCNC: NORMAL MG/DL
BLOOD URINE, POC: NORMAL
CLARITY, POC UA: CLEAR
COLOR, POC UA: NORMAL
GLUCOSE UR QL STRIP: NORMAL
KETONES UR QL STRIP: NORMAL
LEUKOCYTE ESTERASE URINE, POC: NORMAL
NITRITE, POC UA: NORMAL
PH, POC UA: 5
PROTEIN, POC: NORMAL
SPECIFIC GRAVITY, POC UA: 1.02
UROBILINOGEN, POC UA: NORMAL

## 2023-11-29 PROCEDURE — 81002 URINALYSIS NONAUTO W/O SCOPE: CPT | Mod: PBBFAC,PN | Performed by: NURSE PRACTITIONER

## 2023-11-29 PROCEDURE — 99396 PR PREVENTIVE VISIT,EST,40-64: ICD-10-PCS | Mod: S$PBB,,, | Performed by: NURSE PRACTITIONER

## 2023-11-29 PROCEDURE — 99396 PREV VISIT EST AGE 40-64: CPT | Mod: S$PBB,,, | Performed by: NURSE PRACTITIONER

## 2023-11-29 PROCEDURE — 99999 PR PBB SHADOW E&M-EST. PATIENT-LVL V: ICD-10-PCS | Mod: PBBFAC,,, | Performed by: NURSE PRACTITIONER

## 2023-11-29 PROCEDURE — 87088 URINE BACTERIA CULTURE: CPT | Performed by: NURSE PRACTITIONER

## 2023-11-29 PROCEDURE — 99215 OFFICE O/P EST HI 40 MIN: CPT | Mod: PBBFAC,PN | Performed by: NURSE PRACTITIONER

## 2023-11-29 PROCEDURE — 1159F PR MEDICATION LIST DOCUMENTED IN MEDICAL RECORD: ICD-10-PCS | Mod: CPTII,,, | Performed by: NURSE PRACTITIONER

## 2023-11-29 PROCEDURE — 1159F MED LIST DOCD IN RCRD: CPT | Mod: CPTII,,, | Performed by: NURSE PRACTITIONER

## 2023-11-29 PROCEDURE — 99999 PR PBB SHADOW E&M-EST. PATIENT-LVL V: CPT | Mod: PBBFAC,,, | Performed by: NURSE PRACTITIONER

## 2023-11-29 PROCEDURE — 99999PBSHW POCT URINE DIPSTICK WITHOUT MICROSCOPE: ICD-10-PCS | Mod: PBBFAC,,,

## 2023-11-29 PROCEDURE — 1160F PR REVIEW ALL MEDS BY PRESCRIBER/CLIN PHARMACIST DOCUMENTED: ICD-10-PCS | Mod: CPTII,,, | Performed by: NURSE PRACTITIONER

## 2023-11-29 PROCEDURE — 1160F RVW MEDS BY RX/DR IN RCRD: CPT | Mod: CPTII,,, | Performed by: NURSE PRACTITIONER

## 2023-11-29 PROCEDURE — 87186 SC STD MICRODIL/AGAR DIL: CPT | Performed by: NURSE PRACTITIONER

## 2023-11-29 PROCEDURE — 3079F DIAST BP 80-89 MM HG: CPT | Mod: CPTII,,, | Performed by: NURSE PRACTITIONER

## 2023-11-29 PROCEDURE — 3008F PR BODY MASS INDEX (BMI) DOCUMENTED: ICD-10-PCS | Mod: CPTII,,, | Performed by: NURSE PRACTITIONER

## 2023-11-29 PROCEDURE — 3074F SYST BP LT 130 MM HG: CPT | Mod: CPTII,,, | Performed by: NURSE PRACTITIONER

## 2023-11-29 PROCEDURE — 87077 CULTURE AEROBIC IDENTIFY: CPT | Performed by: NURSE PRACTITIONER

## 2023-11-29 PROCEDURE — 3079F PR MOST RECENT DIASTOLIC BLOOD PRESSURE 80-89 MM HG: ICD-10-PCS | Mod: CPTII,,, | Performed by: NURSE PRACTITIONER

## 2023-11-29 PROCEDURE — 3074F PR MOST RECENT SYSTOLIC BLOOD PRESSURE < 130 MM HG: ICD-10-PCS | Mod: CPTII,,, | Performed by: NURSE PRACTITIONER

## 2023-11-29 PROCEDURE — 3008F BODY MASS INDEX DOCD: CPT | Mod: CPTII,,, | Performed by: NURSE PRACTITIONER

## 2023-11-29 PROCEDURE — 99999PBSHW POCT URINE DIPSTICK WITHOUT MICROSCOPE: Mod: PBBFAC,,,

## 2023-11-29 PROCEDURE — 87086 URINE CULTURE/COLONY COUNT: CPT | Performed by: NURSE PRACTITIONER

## 2023-11-29 RX ORDER — ESTRADIOL 0.1 MG/G
CREAM VAGINAL
Qty: 42.5 G | Refills: 2 | Status: SHIPPED | OUTPATIENT
Start: 2023-11-29 | End: 2024-12-12

## 2023-11-29 RX ORDER — HYDROCHLOROTHIAZIDE 12.5 MG/1
1 TABLET ORAL DAILY
COMMUNITY
Start: 2023-08-17 | End: 2024-08-16

## 2023-11-29 RX ORDER — HYDROCHLOROTHIAZIDE 12.5 MG/1
12.5 CAPSULE ORAL
Status: ON HOLD | COMMUNITY
End: 2024-01-03 | Stop reason: SDUPTHER

## 2023-11-29 RX ORDER — SULFAMETHOXAZOLE AND TRIMETHOPRIM 800; 160 MG/1; MG/1
1 TABLET ORAL EVERY 12 HOURS
COMMUNITY
Start: 2023-09-10 | End: 2023-12-01

## 2023-11-29 RX ORDER — BENZONATATE 200 MG/1
200 CAPSULE ORAL 3 TIMES DAILY PRN
COMMUNITY

## 2023-11-29 RX ORDER — LEVOTHYROXINE SODIUM 50 UG/1
1 TABLET ORAL EVERY MORNING
Status: ON HOLD | COMMUNITY
Start: 2023-08-17 | End: 2024-01-08 | Stop reason: HOSPADM

## 2023-11-29 RX ORDER — AZITHROMYCIN 250 MG/1
TABLET, FILM COATED ORAL
Status: ON HOLD | COMMUNITY
Start: 2023-08-17 | End: 2024-01-03 | Stop reason: ALTCHOICE

## 2023-11-29 RX ORDER — DEXTROAMPHETAMINE SACCHARATE, AMPHETAMINE ASPARTATE, DEXTROAMPHETAMINE SULFATE AND AMPHETAMINE SULFATE 7.5; 7.5; 7.5; 7.5 MG/1; MG/1; MG/1; MG/1
1 TABLET ORAL 2 TIMES DAILY
Status: ON HOLD | COMMUNITY
Start: 2023-09-18 | End: 2024-01-08 | Stop reason: HOSPADM

## 2023-11-29 RX ORDER — FLUTICASONE PROPIONATE 50 MCG
SPRAY, SUSPENSION (ML) NASAL
COMMUNITY
Start: 2023-08-16

## 2023-11-29 RX ORDER — DULOXETIN HYDROCHLORIDE 30 MG/1
30 CAPSULE, DELAYED RELEASE ORAL
Status: ON HOLD | COMMUNITY
Start: 2023-09-26 | End: 2024-01-03 | Stop reason: CLARIF

## 2023-11-29 RX ORDER — HYDROXYZINE PAMOATE 25 MG/1
CAPSULE ORAL
Status: ON HOLD | COMMUNITY
Start: 2023-09-10 | End: 2024-01-08 | Stop reason: HOSPADM

## 2023-11-29 NOTE — TELEPHONE ENCOUNTER
----- Message from Gracie Monsalve sent at 11/29/2023  1:07 PM CST -----  Regarding: missed call  Name of caller: anna       What is the requesting detail: pt is returning the call. Please advise       Can the clinic reply by [facility]:       What number to call back: [phone]

## 2023-11-29 NOTE — PROGRESS NOTES
"Chief Complaint: Well Woman Exam     HPI:      Gisela Monique is a 53 y.o.  who presents today for well woman exam.  Patient has had a hysterectomy at 22 yo. Patient is currently sexually active with a single male partner.She declines STD screening today. Ms. Monique confirms that she is safe at home.  Ms. Monique denies abnormal vaginal discharge, pelvic pain, or changes in appetite.    Reports vaginal irritation x 10 days with bloody vaginal discharge x 2 days   Reports "knot" of left groin x 1 month that was painful and large but is better now. Reports knot feels "deep."  Reports continued decreased libido and dyspareunia.     Previous Pap:   NILM/ HPV neg  (2022) abnormal pap smear 4 years ago, did not get follow-up, history of cryotherapy in the past  Previous Mammogram: BiRads: 1 TC score 16.13%  (2022)   Most Recent Dexa: - osteopenia-   Colonoscopy: - per pt.     Family History   Problem Relation Age of Onset    Breast cancer Maternal Grandmother     Colon cancer Neg Hx     Ovarian cancer Neg Hx      OB History          3    Para   3    Term                AB        Living             SAB        IAB        Ectopic        Multiple        Live Births                     ROS:     GENERAL: Denies unintentional weight gain or weight loss. Feeling well overall.   BREASTS: Denies pain, lumps, or nipple discharge.   ABDOMEN: Denies abdominal pain, constipation, diarrhea, nausea, vomiting, change in appetite.  URINARY: Denies frequency, dysuria, hematuria.  PSYCHIATRIC: Denies depression, anxiety or mood swings.    Physical Exam:      PHYSICAL EXAM:  /80   Pulse 83   Ht 5' 8" (1.727 m)   Wt 103 kg (227 lb 2.9 oz)   BMI 34.54 kg/m²   Body mass index is 34.54 kg/m².     APPEARANCE: Well nourished, well developed, in no acute distress.  PSYCH: Appropriate mood and affect.  ABDOMEN: Soft.  No tenderness or masses.    BREASTS: Symmetrical, no skin changes or visible lesions.  No " palpable masses or nipple discharge bilaterally.  PELVIC: Normal external genitalia without lesions.  Normal hair distribution.  Adequate perineal body, urethral meatus retracted .  Vagina atrophic without lesions or discharge.  Cervix and uterus absent.  No significant cystocele or rectocele.  Bimanual exam shows  Adnexa without masses or tenderness.    Bimanual limited due to habitus.   GROIN: 1 cm nodule palpated at left thigh fold. No signs of infection. Skin appears to have opened but is now healed. No drainage.     Assessment/Plan:     Encounter for well woman exam with routine gynecological exam    Encounter for screening mammogram for malignant neoplasm of breast    Vaginal atrophy  -     estradioL (ESTRACE) 0.01 % (0.1 mg/gram) vaginal cream; Place 1 g vaginally once daily for 14 days, THEN 1 g twice a week.  Dispense: 42.5 g; Refill: 2    Atrophic vaginitis  -     estradioL (ESTRACE) 0.01 % (0.1 mg/gram) vaginal cream; Place 1 g vaginally once daily for 14 days, THEN 1 g twice a week.  Dispense: 42.5 g; Refill: 2        Counseling:     Patient was counseled today on current ASCCP pap guidelines, the recommendation for yearly pelvic exams, healthy diet and exercise routines, breast self awareness and annual mammograms.She is to see her PCP for other health maintenance.     Estradiol vaginal cream.

## 2023-11-29 NOTE — TELEPHONE ENCOUNTER
----- Message from ERVIN Perrin sent at 11/29/2023  7:08 AM CST -----  Contact: [phone]  Please schedule appt.I  will having openings next week.   ----- Message -----  From: Rhonda Lopez LPN  Sent: 11/28/2023   4:25 PM CST  To: ERVIN Perrin      ----- Message -----  From: Krysta Mohan  Sent: 11/28/2023   4:06 PM CST  To: Marv Harris Staff    1MEDICALADVICE     Patient is calling for Medical Advice regarding:bloody discharge     How long has patient had these symptoms:1 week     Pharmacy name and phone#:  [facility] [address]  [address] [address]  Phone: [phone] Fax: [phone]       Would like response via [employer]:  no     Comments:  Pt states she has a like boil in the private area as well she states this is painful         
Called pt no answer, No VM set up. No working  # contacts  
all other ROS negative except as per HPI

## 2023-12-01 ENCOUNTER — TELEPHONE (OUTPATIENT)
Dept: OBSTETRICS AND GYNECOLOGY | Facility: CLINIC | Age: 53
End: 2023-12-01
Payer: MEDICAID

## 2023-12-01 ENCOUNTER — PATIENT MESSAGE (OUTPATIENT)
Dept: OBSTETRICS AND GYNECOLOGY | Facility: CLINIC | Age: 53
End: 2023-12-01
Payer: MEDICAID

## 2023-12-01 DIAGNOSIS — N39.0 URINARY TRACT INFECTION WITHOUT HEMATURIA, SITE UNSPECIFIED: Primary | ICD-10-CM

## 2023-12-01 RX ORDER — NITROFURANTOIN 25; 75 MG/1; MG/1
100 CAPSULE ORAL 2 TIMES DAILY
Qty: 10 CAPSULE | Refills: 0 | Status: SHIPPED | OUTPATIENT
Start: 2023-12-01 | End: 2023-12-06

## 2023-12-01 NOTE — TELEPHONE ENCOUNTER
----- Message from ERVIN Perrin sent at 12/1/2023 10:12 AM CST -----  Please call pt. Inform her she has a uti. I am sending her antibiotics to take.

## 2023-12-02 LAB — BACTERIA UR CULT: ABNORMAL

## 2024-01-03 PROBLEM — F41.9 ANXIETY AND DEPRESSION: Status: ACTIVE | Noted: 2018-10-10

## 2024-01-03 PROBLEM — E53.8 VITAMIN B12 DEFICIENCY: Status: ACTIVE | Noted: 2019-05-10

## 2024-01-03 PROBLEM — F32.A ANXIETY AND DEPRESSION: Status: RESOLVED | Noted: 2018-10-10 | Resolved: 2024-01-03

## 2024-01-03 PROBLEM — E55.9 VITAMIN D DEFICIENCY: Status: ACTIVE | Noted: 2023-08-17

## 2024-01-03 PROBLEM — I35.1 NONRHEUMATIC AORTIC VALVE INSUFFICIENCY: Status: ACTIVE | Noted: 2023-08-20

## 2024-01-03 PROBLEM — M54.2 NECK PAIN: Status: RESOLVED | Noted: 2019-07-03 | Resolved: 2024-01-03

## 2024-01-03 PROBLEM — F32.A ANXIETY AND DEPRESSION: Status: ACTIVE | Noted: 2018-10-10

## 2024-01-03 PROBLEM — E11.9 DIABETES MELLITUS: Status: ACTIVE | Noted: 2019-05-10

## 2024-01-03 PROBLEM — I27.20 PULMONARY HYPERTENSION: Status: ACTIVE | Noted: 2021-02-05

## 2024-01-03 PROBLEM — E66.9 OBESITY: Status: ACTIVE | Noted: 2018-11-13

## 2024-01-03 PROBLEM — F10.930 ALCOHOL WITHDRAWAL SYNDROME WITHOUT COMPLICATION: Status: ACTIVE | Noted: 2024-01-03

## 2024-01-03 PROBLEM — M54.2 NECK PAIN: Status: ACTIVE | Noted: 2019-07-03

## 2024-01-03 PROBLEM — F41.9 ANXIETY AND DEPRESSION: Status: RESOLVED | Noted: 2018-10-10 | Resolved: 2024-01-03

## 2024-01-03 PROBLEM — G47.33 OSA (OBSTRUCTIVE SLEEP APNEA): Status: ACTIVE | Noted: 2019-05-10

## 2024-01-03 PROBLEM — I10 ESSENTIAL HYPERTENSION: Status: ACTIVE | Noted: 2019-05-09

## 2024-01-03 PROBLEM — G40.909 SEIZURE DISORDER: Status: ACTIVE | Noted: 2019-05-09

## 2024-01-03 PROBLEM — R53.82 CHRONIC FATIGUE: Status: ACTIVE | Noted: 2018-10-10

## 2024-01-03 PROBLEM — F10.20 ALCOHOL USE DISORDER, MODERATE, DEPENDENCE: Status: ACTIVE | Noted: 2024-01-03

## 2024-01-03 PROBLEM — R07.81 RIB PAIN: Status: RESOLVED | Noted: 2022-10-27 | Resolved: 2024-01-03

## 2024-01-04 PROBLEM — F10.20 ALCOHOL USE DISORDER, SEVERE, DEPENDENCE: Status: ACTIVE | Noted: 2024-01-04

## 2024-01-25 ENCOUNTER — TELEPHONE (OUTPATIENT)
Dept: ORTHOPEDICS | Facility: CLINIC | Age: 54
End: 2024-01-25
Payer: MEDICAID

## 2024-01-25 DIAGNOSIS — M25.562 ACUTE PAIN OF LEFT KNEE: Primary | ICD-10-CM

## 2024-01-25 NOTE — TELEPHONE ENCOUNTER
----- Message from Maryellen Cooper sent at 1/25/2024 12:35 PM CST -----  Regarding: Pt called to schedule a follow up appt due to being in pain after kneeling down and hearing something pop and pt would like a call back today  Same Day Appointment Access Request:    Pt called to schedule a follow up appt due to being in pain after kneeling down and hearing something pop and pt would like a call back today. Pt would like to be seen in the morning.    Pt can be reached at  [phone]

## 2024-02-08 ENCOUNTER — TELEPHONE (OUTPATIENT)
Dept: ORTHOPEDICS | Facility: CLINIC | Age: 54
End: 2024-02-08
Payer: MEDICAID

## 2024-02-08 NOTE — TELEPHONE ENCOUNTER
----- Message from Krysta Mohan sent at 2/8/2024  2:39 PM CST -----  Contact: [phone]  1MEDICALADVICE     Patient is calling for Medical Advice regarding:needs an appt     How long has patient had these symptoms:    Pharmacy name and phone#:    Would like response via [facility]:  no     Comments:  Pt is having issues with the left knee and right hip please give return call

## 2024-02-19 DIAGNOSIS — M25.551 RIGHT HIP PAIN: Primary | ICD-10-CM

## 2024-04-23 ENCOUNTER — TELEPHONE (OUTPATIENT)
Dept: ORTHOPEDICS | Facility: CLINIC | Age: 54
End: 2024-04-23
Payer: MEDICAID

## 2024-04-23 NOTE — TELEPHONE ENCOUNTER
----- Message from Parvin Miner sent at 4/23/2024  3:49 PM CDT -----  Consult/Advisory    Name Of Caller:Gisela       Contact Preference:[phone]    Nature of call: Ptn stated she is in severe pain and she will not be able to wait until Briana

## 2024-04-24 ENCOUNTER — TELEPHONE (OUTPATIENT)
Dept: PAIN MEDICINE | Facility: CLINIC | Age: 54
End: 2024-04-24
Payer: MEDICAID

## 2024-04-24 ENCOUNTER — TELEPHONE (OUTPATIENT)
Dept: ORTHOPEDICS | Facility: CLINIC | Age: 54
End: 2024-04-24
Payer: MEDICAID

## 2024-04-24 NOTE — TELEPHONE ENCOUNTER
----- Message from Marta Bush sent at 4/24/2024  3:13 PM CDT -----  Regarding: pt advice  Contact: [phone]  Pt calling in regards to a same day visit due to leg and knee pain, pt requesting sooner than what's available. Pls call

## 2024-04-26 DIAGNOSIS — D50.0 IRON DEFICIENCY ANEMIA DUE TO CHRONIC BLOOD LOSS: Primary | ICD-10-CM

## 2024-04-28 PROBLEM — S89.91XA: Status: ACTIVE | Noted: 2024-04-28

## 2024-04-28 PROBLEM — S82.141A CLOSED FRACTURE OF RIGHT TIBIAL PLATEAU: Status: ACTIVE | Noted: 2024-04-28

## 2024-04-29 ENCOUNTER — TELEPHONE (OUTPATIENT)
Dept: ORTHOPEDICS | Facility: CLINIC | Age: 54
End: 2024-04-29
Payer: MEDICAID

## 2024-04-29 NOTE — TELEPHONE ENCOUNTER
----- Message from Marina Murrell LPN sent at 4/29/2024 11:39 AM CDT -----  Regarding: FW: Sooner Appt Access  Contact: [phone]    ----- Message -----  From: Darya Swanson  Sent: 4/29/2024  11:37 AM CDT  To: Amado BUCKLEY Staff  Subject: Sooner Appt Access                               Patient's  Matt is calling to get pt scheduled w provider for closed fracture of right tibial plateau, initial encounter. Pt has a referral in system. I informed pt's  first available appt isn't until May 21 he stated he would like her to be seen sooner. Please give pt's a call if pt can be scheduled sooner w provider.

## 2024-05-02 ENCOUNTER — TELEPHONE (OUTPATIENT)
Dept: ORTHOPEDICS | Facility: CLINIC | Age: 54
End: 2024-05-02

## 2024-05-02 ENCOUNTER — OFFICE VISIT (OUTPATIENT)
Dept: ORTHOPEDICS | Facility: CLINIC | Age: 54
End: 2024-05-02
Payer: MEDICAID

## 2024-05-02 VITALS
DIASTOLIC BLOOD PRESSURE: 74 MMHG | SYSTOLIC BLOOD PRESSURE: 125 MMHG | WEIGHT: 198 LBS | BODY MASS INDEX: 30.01 KG/M2 | HEART RATE: 69 BPM | HEIGHT: 68 IN

## 2024-05-02 DIAGNOSIS — S82.145A CLOSED NONDISPLACED FRACTURE OF LEFT TIBIAL PLATEAU, INITIAL ENCOUNTER: Primary | ICD-10-CM

## 2024-05-02 DIAGNOSIS — M17.12 PRIMARY OSTEOARTHRITIS OF LEFT KNEE: ICD-10-CM

## 2024-05-02 PROCEDURE — 3074F SYST BP LT 130 MM HG: CPT | Mod: CPTII,,,

## 2024-05-02 PROCEDURE — 1159F MED LIST DOCD IN RCRD: CPT | Mod: CPTII,,,

## 2024-05-02 PROCEDURE — 99214 OFFICE O/P EST MOD 30 MIN: CPT | Mod: S$PBB,,,

## 2024-05-02 PROCEDURE — 99215 OFFICE O/P EST HI 40 MIN: CPT | Mod: PBBFAC,PN

## 2024-05-02 PROCEDURE — 99999 PR PBB SHADOW E&M-EST. PATIENT-LVL V: CPT | Mod: PBBFAC,,,

## 2024-05-02 PROCEDURE — 3078F DIAST BP <80 MM HG: CPT | Mod: CPTII,,,

## 2024-05-02 PROCEDURE — 1160F RVW MEDS BY RX/DR IN RCRD: CPT | Mod: CPTII,,,

## 2024-05-02 PROCEDURE — 3044F HG A1C LEVEL LT 7.0%: CPT | Mod: CPTII,,,

## 2024-05-02 PROCEDURE — 3008F BODY MASS INDEX DOCD: CPT | Mod: CPTII,,,

## 2024-05-02 RX ORDER — OXYCODONE AND ACETAMINOPHEN 5; 325 MG/1; MG/1
1 TABLET ORAL EVERY 4 HOURS PRN
Qty: 28 EACH | Refills: 0 | Status: SHIPPED | OUTPATIENT
Start: 2024-05-02 | End: 2024-05-13

## 2024-05-02 RX ORDER — DICLOFENAC SODIUM 10 MG/G
2 GEL TOPICAL 4 TIMES DAILY
Qty: 350 G | Refills: 2 | Status: SHIPPED | OUTPATIENT
Start: 2024-05-02

## 2024-05-02 NOTE — PROGRESS NOTES
Patient ID: Gisela Monique is a 54 y.o. female    Injury and Pain of the Right Knee    History of Present Illness:    Gisela Monique presents to clinic for right knee pain.  Previously known to clinic about 1 year status post left knee arthroscopy.  Patient reports 2 days ago she had a syncopal episode and fell on her right knee and had LOS.  She was seen in the ER and was found to have a nondisplaced posterior tibial plateau fracture with possible PCL involvement.  She was placed in a knee immobilizer and referred for orthopedic evaluation.  She denies numbness and tingling.  Pain is located to posterior and lateral right knee.  States Norco is not improving her pain.    Mechanical symptoms: +  Instability: +    Occupation: retired     Ambulating: wheelchair  Diabetic: no  Smoking: no  Hx of DVT/PE: no     PAST MEDICAL HISTORY:   Past Medical History:   Diagnosis Date    Anxiety     Bipolar disorder, unspecified     Depression     Diabetes mellitus     Rheumatoid arthritis     Thyroid disease      PAST SURGICAL HISTORY:   Past Surgical History:   Procedure Laterality Date    ARTHROSCOPIC CHONDROPLASTY OF KNEE JOINT Left 3/27/2023    Procedure: ARTHROSCOPY, KNEE, WITH CHONDROPLASTY;  Surgeon: Amado Rock MD;  Location: [facility];  Service: Orthopedics;  Laterality: Left;  Flimper video   audra suchondroplasty   c-arm     SECTION      GASTRIC BYPASS      HYSTERECTOMY      KNEE ARTHROSCOPY Left 2023    with chondroplasty    KNEE CARTILAGE SURGERY Bilateral     x2 to Right, x1 to Left     FAMILY HISTORY:   Family History   Problem Relation Name Age of Onset    Breast cancer Maternal Grandmother      Colon cancer Neg Hx      Ovarian cancer Neg Hx       SOCIAL HISTORY:   Social History     Occupational History    Not on file   Tobacco Use    Smoking status: Never    Smokeless tobacco: Never   Substance and Sexual Activity    Alcohol use: Yes     Comment: occasionally    Drug use: Not Currently     "Sexual activity: Not Currently     Partners: Male        MEDICATIONS:   Current Outpatient Medications:     benzonatate (TESSALON) 200 MG capsule, Take 200 mg by mouth 3 (three) times daily as needed., Disp: , Rfl:     cyanocobalamin 1,000 mcg/mL injection, Inject 1 mL (1,000 mcg total) into the muscle once a week for 28 days, THEN 1 mL (1,000 mcg total) every 30 days., Disp: 16 mL, Rfl: 0    ergocalciferol (ERGOCALCIFEROL) 50,000 unit Cap, , Disp: , Rfl:     EScitalopram oxalate (LEXAPRO) 20 MG tablet, Take 1 tablet (20 mg total) by mouth once daily., Disp: 30 tablet, Rfl: 11    estradioL (ESTRACE) 0.01 % (0.1 mg/gram) vaginal cream, Place 1 g vaginally once daily for 14 days, THEN 1 g twice a week., Disp: 42.5 g, Rfl: 2    fluticasone propionate (FLONASE) 50 mcg/actuation nasal spray, by Each Nostril route., Disp: , Rfl:     folic acid (FOLVITE) 1 MG tablet, Take 1 tablet (1 mg total) by mouth once daily., Disp: 90 tablet, Rfl: 0    hydroCHLOROthiazide (HYDRODIURIL) 12.5 MG Tab, Take 1 tablet by mouth once daily., Disp: , Rfl:     hydrOXYzine HCL (ATARAX) 50 MG tablet, Take 1 tablet (50 mg total) by mouth 3 (three) times daily as needed for Anxiety (sleep)., Disp: 90 tablet, Rfl: 1    hydrOXYzine pamoate (VISTARIL) 50 MG Cap, Take 50 mg by mouth nightly as needed., Disp: , Rfl:     insulin syringe-needle U-100 (BD INSULIN SYRINGE) 1 mL 26 x 1/2" Syrg, Use to give b12 shot intramuscularly, Disp: 15 each, Rfl: 1    levothyroxine (SYNTHROID) 50 MCG tablet, Take 1 tablet (50 mcg total) by mouth before breakfast., Disp: 30 tablet, Rfl: 11    multivitamin-min-iron-FA-vit K (BARIATRIC MULTIVITAMINS) 45 mg iron- 800 mcg-120 mcg Cap, Take 1 capsule by mouth once daily., Disp: 30 capsule, Rfl: 11    pantoprazole (PROTONIX) 40 MG tablet, Take 1 tablet (40 mg total) by mouth once daily., Disp: 30 tablet, Rfl: 11    propranoloL (INDERAL) 40 MG tablet, Take 1 tablet (40 mg total) by mouth 2 (two) times daily., Disp: 60 tablet, " "Rfl: 11    syringe with needle 3 mL 22 x 1 1/2" Syrg, Use to inject B12 shots intramuscularly as directed., Disp: 16 each, Rfl: 1    thiamine 100 MG tablet, Take 1 tablet (100 mg total) by mouth once daily., Disp: 90 tablet, Rfl: 2    diclofenac sodium (VOLTAREN) 1 % Gel, Apply 2 g topically 4 (four) times daily., Disp: 350 g, Rfl: 2    famotidine (PEPCID) 20 MG tablet, Take 1 tablet (20 mg total) by mouth 2 (two) times daily. for 5 days, Disp: 10 tablet, Rfl: 0    gabapentin (NEURONTIN) 300 MG capsule, Take 1 capsule (300 mg total) by mouth 3 (three) times daily., Disp: 90 capsule, Rfl: 11  ALLERGIES: Review of patient's allergies indicates:  No Known Allergies      Physical Exam     Vitals:    05/02/24 0933   BP: 125/74   Pulse: 69     Alert and oriented to person, place and time. No acute distress. Well-groomed, not ill appearing. Pupils round and reactive, normal respiratory effort, no audible wheezing.     OBSERVATION / INSPECTION   There is swelling and ecchymosis to right knee.  Range of motion 20-50.  There is tenderness over the lateral joint line and popliteal fossa.  No medial joint line tenderness.  No calf tenderness.    EXTREMITY NEURO-VASCULAR EXAMINATION:   Sensation:  Grossly intact to light touch all dermatomal regions.   Motor Function:  Fully intact motor function at hip, knee, foot and ankle    DTRs;  quadriceps and  achilles 2+.  No clonus and downgoing Babinski.    Vascular status:  DP and PT pulses 2+, brisk capillary refill, symmetric.     Imaging:     Left knee xray: There is a small bone fragment at the posterior margin of the tibial plateau on the lateral projection only. This is new since the prior exam and is un certain whether it is medial or lateral. The remainder of the bones are intact. Suprapatella bursa effusion is noted. The remainder of the bones are intact.     Left knee CT: Posterior RIGHT tibial plateau avulsion possibly involving the insertion of the PCL.  Correlate for " instability. Small lipohemarthrosis. No additional fractures of the right hip, femur or knee.    Assessment & Plan    Closed nondisplaced fracture of left tibial plateau, initial encounter  -     diclofenac sodium (VOLTAREN) 1 % Gel; Apply 2 g topically 4 (four) times daily.  Dispense: 350 g; Refill: 2  -     X-Ray Knee 3 View Left; Future; Expected date: 05/02/2024    Primary osteoarthritis of left knee  -     diclofenac sodium (VOLTAREN) 1 % Gel; Apply 2 g topically 4 (four) times daily.  Dispense: 350 g; Refill: 2  -     X-Ray Knee 3 View Left; Future; Expected date: 05/02/2024       I made the decision to obtain old records of the patient including previous notes and imaging. New imaging was ordered today of the extremity or extremities evaluated. I independently reviewed and interpreted the radiographs and/or MRIs/CT scan today as well as prior imaging.    We discussed at length different treatment options including conservative vs surgical management. These include anti-inflammatories, acetaminophen, rest, ice, heat, formal physical therapy including strengthening and stretching exercises, home exercise programs, injections, dry needling, and finally surgical intervention.      Patient here for acute right knee pain after sustaining a fall.  She does have nondisplaced posterior tibial plateau fracture.  We discussed generally we treat these nonoperatively with toe-touch weight-bearing for 3-4 weeks and no flexion past 90.  Due to possible PCL involvement, if pain continues she will have to follow up in another facility as Dr. Rock here does not perform PCL reconstruction.  We will temporarily increase her pain medication to Percocet due to the fall.  Rx sent.  I would like to see her back in 2-3 weeks with repeat x-rays.  May wear knee immobilizer when ambulating and lying  When sitting, do not bend knee past 90 degrees  Rice treatment      Follow up: 2-3 weeks  X-rays next visit: none    All questions were  answered and patient is agreeable to the above plan.

## 2024-05-02 NOTE — TELEPHONE ENCOUNTER
----- Message from Aisha Fischer PA-C sent at 5/2/2024 11:12 AM CDT -----  Contact: Patient, [phone]  Whoops that was my bad. Just sent  ----- Message -----  From: Marina Murrell LPN  Sent: 5/2/2024  11:09 AM CDT  To: Aisha Fischer PA-C      ----- Message -----  From: Octavia Elizabeth  Sent: 5/2/2024  11:07 AM CDT  To: Amado BUCKLEY Staff    Calling because she was seen today and was suppose to get a prescription for Percocet. It is not at the pharmacy. Please advise. Thanks.            [facility] - [facility] [address]  [address] [address]  Phone: [phone] Fax: [phone]

## 2024-05-04 ENCOUNTER — NURSE TRIAGE (OUTPATIENT)
Dept: ADMINISTRATIVE | Facility: CLINIC | Age: 54
End: 2024-05-04
Payer: MEDICAID

## 2024-05-05 NOTE — TELEPHONE ENCOUNTER
Pt calling in with worsening of pain to the right leg. She had a fall last month with a fracture.  Surgery is pending but not scheduled at this time. Pt tearful during call and reports pain is currently 10/10. Care advice is go to ER now. Pt verbalized understanding.   Reason for Disposition   Sounds like a serious complication to the triager    Additional Information   Negative: Sounds like a life-threatening emergency to the triager    Protocols used: Recent Medical Visit for Injury Follow-up Call-A-

## 2024-05-06 ENCOUNTER — TELEPHONE (OUTPATIENT)
Dept: ORTHOPEDICS | Facility: CLINIC | Age: 54
End: 2024-05-06
Payer: MEDICAID

## 2024-05-06 NOTE — TELEPHONE ENCOUNTER
Spoke with patient. Pt advise that Aisha can send percocet tomorrow but taking 2 of the 5 mg Percocet is the same as taking one 10 mg. Pt also advise of toe touch weight bearing and not bending past 90. Pt verbalized understanding.

## 2024-05-13 ENCOUNTER — TELEPHONE (OUTPATIENT)
Dept: ORTHOPEDICS | Facility: CLINIC | Age: 54
End: 2024-05-13
Payer: MEDICAID

## 2024-05-13 NOTE — TELEPHONE ENCOUNTER
----- Message from Aisha Fischer PA-C sent at 5/13/2024  2:32 PM CDT -----  I do not really need to. Plus she is not supposed to stand on it. I told her toe-touch weight bearing until she sees me next.  ----- Message -----  From: Marina Murrell LPN  Sent: 5/13/2024   1:46 PM CDT  To: Aisha Fischer PA-C    Did you want to see her sooner that 5/21?  ----- Message -----  From: Jesenia Salmeron  Sent: 5/13/2024   1:37 PM CDT  To: Amado BUCKLEY Staff    Type:  Sooner Appointment Request     Caller is requesting a sooner appointment.  Caller declined first available appointment listed below.  Caller will not accept being placed on the waitlist and is requesting a message be sent to doctor.     Name of Caller: SISSY MOLINA [[id number]]    When is the first available appointment? 5/21    Reason for Visit: Knee pain     Would the patient rather a call back or a response via [facility]? Call back     Best Call Back Number: [phone]    Additional Information: Pt stated that he pain is getting worse she can not stand on it would like to know if she can be seen as soon as possible. Please advise     Thank you!  
Attempted to call patient. No answered.   
Yes

## 2024-05-20 ENCOUNTER — TELEPHONE (OUTPATIENT)
Dept: ORTHOPEDICS | Facility: CLINIC | Age: 54
End: 2024-05-20
Payer: MEDICAID

## 2024-05-20 DIAGNOSIS — M25.551 RIGHT HIP PAIN: Primary | ICD-10-CM

## 2024-05-20 NOTE — TELEPHONE ENCOUNTER
CALLED PT TO CONFIRM HER APPT AND ALSO TO REMIND HER TO GET HER X-RAYS BEFORE COMING TO CLINIC ON TOMORROW. PT V/U

## 2024-05-21 ENCOUNTER — OFFICE VISIT (OUTPATIENT)
Dept: ORTHOPEDICS | Facility: CLINIC | Age: 54
End: 2024-05-21
Payer: MEDICAID

## 2024-05-21 VITALS
HEART RATE: 64 BPM | WEIGHT: 182.13 LBS | SYSTOLIC BLOOD PRESSURE: 132 MMHG | HEIGHT: 68 IN | BODY MASS INDEX: 27.6 KG/M2 | DIASTOLIC BLOOD PRESSURE: 80 MMHG

## 2024-05-21 DIAGNOSIS — S82.145D CLOSED NONDISPLACED FRACTURE OF LEFT TIBIAL PLATEAU WITH ROUTINE HEALING, SUBSEQUENT ENCOUNTER: Primary | ICD-10-CM

## 2024-05-21 DIAGNOSIS — M25.561 RIGHT KNEE PAIN, UNSPECIFIED CHRONICITY: Primary | ICD-10-CM

## 2024-05-21 PROCEDURE — 3075F SYST BP GE 130 - 139MM HG: CPT | Mod: CPTII,,,

## 2024-05-21 PROCEDURE — 3079F DIAST BP 80-89 MM HG: CPT | Mod: CPTII,,,

## 2024-05-21 PROCEDURE — 99999 PR PBB SHADOW E&M-EST. PATIENT-LVL IV: CPT | Mod: PBBFAC,,,

## 2024-05-21 PROCEDURE — 99213 OFFICE O/P EST LOW 20 MIN: CPT | Mod: S$PBB,,,

## 2024-05-21 PROCEDURE — 3044F HG A1C LEVEL LT 7.0%: CPT | Mod: CPTII,,,

## 2024-05-21 PROCEDURE — 99214 OFFICE O/P EST MOD 30 MIN: CPT | Mod: PBBFAC,PN

## 2024-05-21 PROCEDURE — 3008F BODY MASS INDEX DOCD: CPT | Mod: CPTII,,,

## 2024-05-21 PROCEDURE — 1160F RVW MEDS BY RX/DR IN RCRD: CPT | Mod: CPTII,,,

## 2024-05-21 PROCEDURE — 1159F MED LIST DOCD IN RCRD: CPT | Mod: CPTII,,,

## 2024-05-21 RX ORDER — OXYCODONE AND ACETAMINOPHEN 10; 325 MG/1; MG/1
1 TABLET ORAL EVERY 4 HOURS PRN
Qty: 28 TABLET | Refills: 0 | Status: SHIPPED | OUTPATIENT
Start: 2024-05-21

## 2024-05-21 RX ORDER — METHYLPREDNISOLONE 4 MG/1
TABLET ORAL
Qty: 21 EACH | Refills: 0 | Status: SHIPPED | OUTPATIENT
Start: 2024-05-21

## 2024-05-21 NOTE — PROGRESS NOTES
Patient ID: Gisela Monique is a 54 y.o. female    Pain of the Right Knee and Pain of the Right Hip    History of Present Illness:    Gisela Monique presents to clinic for right knee pain.  Previously known to clinic about 1 year status post left knee arthroscopy.  Patient reports 2 days ago she had a syncopal episode and fell on her right knee and had LOS.  She was seen in the ER and was found to have a nondisplaced posterior tibial plateau fracture with possible PCL involvement.  She was placed in a knee immobilizer and referred for orthopedic evaluation.  She denies numbness and tingling.  Pain is located to posterior and lateral right knee.  States Norco is not improving her pain.    Mechanical symptoms: +  Instability: +    Occupation: retired     Ambulating: wheelchair  Diabetic: no  Smoking: no  Hx of DVT/PE: no     ____________________________________________________________________    Interval history 2024 : Patient returns today for follow up of right knee pain.  She has been fairly compliant with nonweightbearing.  Does state she has put her heel down.  She feels her knee is swelling more.  Requesting Percocet refill.  She does have a follow up with Dr. Espinal tomorrow.      PAST MEDICAL HISTORY:   Past Medical History:   Diagnosis Date    Anxiety     Bipolar disorder, unspecified     Depression     Diabetes mellitus     Rheumatoid arthritis     Thyroid disease      PAST SURGICAL HISTORY:   Past Surgical History:   Procedure Laterality Date    ARTHROSCOPIC CHONDROPLASTY OF KNEE JOINT Left 3/27/2023    Procedure: ARTHROSCOPY, KNEE, WITH CHONDROPLASTY;  Surgeon: Amado Rock MD;  Location: [facility];  Service: Orthopedics;  Laterality: Left;  [facility] video   audra suchondroplasty   c-arm     SECTION      GASTRIC BYPASS      HYSTERECTOMY      KNEE ARTHROSCOPY Left 2023    with chondroplasty    KNEE CARTILAGE SURGERY Bilateral     x2 to Right, x1 to Left     FAMILY HISTORY:   Family  "History   Problem Relation Name Age of Onset    Breast cancer Maternal Grandmother      Colon cancer Neg Hx      Ovarian cancer Neg Hx       SOCIAL HISTORY:   Social History     Occupational History    Not on file   Tobacco Use    Smoking status: Never    Smokeless tobacco: Never   Substance and Sexual Activity    Alcohol use: Yes     Comment: occasionally    Drug use: Not Currently    Sexual activity: Not Currently     Partners: Male        MEDICATIONS:   Current Outpatient Medications:     cyanocobalamin 1,000 mcg/mL injection, Inject 1 mL (1,000 mcg total) into the muscle once a week for 28 days, THEN 1 mL (1,000 mcg total) every 30 days., Disp: 16 mL, Rfl: 0    diclofenac sodium (VOLTAREN) 1 % Gel, Apply 2 g topically 4 (four) times daily., Disp: 350 g, Rfl: 2    ergocalciferol (ERGOCALCIFEROL) 50,000 unit Cap, , Disp: , Rfl:     EScitalopram oxalate (LEXAPRO) 20 MG tablet, Take 1 tablet (20 mg total) by mouth once daily., Disp: 30 tablet, Rfl: 11    folic acid (FOLVITE) 1 MG tablet, Take 1 tablet (1 mg total) by mouth once daily., Disp: 90 tablet, Rfl: 0    hydrOXYzine pamoate (VISTARIL) 50 MG Cap, Take 50 mg by mouth nightly as needed., Disp: , Rfl:     insulin syringe-needle U-100 (BD INSULIN SYRINGE) 1 mL 26 x 1/2" Syrg, Use to give b12 shot intramuscularly, Disp: 15 each, Rfl: 1    levothyroxine (SYNTHROID) 50 MCG tablet, Take 1 tablet (50 mcg total) by mouth before breakfast., Disp: 30 tablet, Rfl: 11    multivitamin-min-iron-FA-vit K (BARIATRIC MULTIVITAMINS) 45 mg iron- 800 mcg-120 mcg Cap, Take 1 capsule by mouth once daily., Disp: 30 capsule, Rfl: 11    propranoloL (INDERAL) 40 MG tablet, Take 1 tablet (40 mg total) by mouth 2 (two) times daily., Disp: 60 tablet, Rfl: 11    thiamine 100 MG tablet, Take 1 tablet (100 mg total) by mouth once daily., Disp: 90 tablet, Rfl: 2    gabapentin (NEURONTIN) 300 MG capsule, Take 1 capsule (300 mg total) by mouth 3 (three) times daily., Disp: 90 capsule, Rfl: 11    " methylPREDNISolone (MEDROL DOSEPACK) 4 mg tablet, use as directed, Disp: 21 each, Rfl: 0    oxyCODONE-acetaminophen (PERCOCET)  mg per tablet, Take 1 tablet by mouth every 4 (four) hours as needed for Pain., Disp: 28 tablet, Rfl: 0    pantoprazole (PROTONIX) 40 MG tablet, Take 1 tablet (40 mg total) by mouth once daily., Disp: 30 tablet, Rfl: 11  ALLERGIES: Review of patient's allergies indicates:  No Known Allergies      Physical Exam     Vitals:    05/21/24 0850   BP: 132/80   Pulse: 64     Alert and oriented to person, place and time. No acute distress. Well-groomed, not ill appearing. Pupils round and reactive, normal respiratory effort, no audible wheezing.     OBSERVATION / INSPECTION   There is swelling to right knee. Range of motion 10-90.  There is tenderness over the lateral joint line and popliteal fossa.  No medial joint line tenderness.  No calf tenderness. Able to extend against resistance. Quad strength 4/5. No palpable effusion.    EXTREMITY NEURO-VASCULAR EXAMINATION:   Sensation:  Grossly intact to light touch all dermatomal regions.   Motor Function:  Fully intact motor function at hip, knee, foot and ankle    DTRs;  quadriceps and  achilles 2+.  No clonus and downgoing Babinski.    Vascular status:  DP and PT pulses 2+, brisk capillary refill, symmetric.     Imaging:     Left knee xray: There is a small bone fragment at the posterior margin of the tibial plateau on the lateral projection only. This is new since the prior exam and is un certain whether it is medial or lateral. The remainder of the bones are intact. Suprapatella bursa effusion is noted. The remainder of the bones are intact.     Left knee CT: Posterior RIGHT tibial plateau avulsion possibly involving the insertion of the PCL.  Correlate for instability. Small lipohemarthrosis. No additional fractures of the right hip, femur or knee.    Repeat xray 5/21/2024 reviewed by me showing similar appearance of posterior aspect of  tibial plateau.     Assessment & Plan    Closed nondisplaced fracture of left tibial plateau with routine healing, subsequent encounter  -     methylPREDNISolone (MEDROL DOSEPACK) 4 mg tablet; use as directed  Dispense: 21 each; Refill: 0  -     oxyCODONE-acetaminophen (PERCOCET)  mg per tablet; Take 1 tablet by mouth every 4 (four) hours as needed for Pain.  Dispense: 28 tablet; Refill: 0      Patient returns today for follow up of right knee pain.  We discussed importance of keeping appointment with [facility] tomorrow as if she requires PCL reconstruction, Dr. Rock does not perform these.  If Dr. Espinal feels no further intervention is needed, she will continue to follow up here.  We discussed x-rays which show stable fracture however no evidence of callus formation today.  I would like her to remain nonweightbearing/TTWB for the next few weeks or until re-evaluation.  Range of motion 0-90.  We will send in Medrol Dosepak for inflammation, Rx sent.  Take as tolerated.  Last Percocet refill sent.  Follow up 2-3 weeks or with Dr. Espinal.    Follow up: 2-3 weeks  X-rays next visit: none    All questions were answered and patient is agreeable to the above plan.

## 2024-05-24 ENCOUNTER — TELEPHONE (OUTPATIENT)
Dept: HEMATOLOGY/ONCOLOGY | Facility: CLINIC | Age: 54
End: 2024-05-24
Payer: MEDICAID

## 2024-05-24 NOTE — TELEPHONE ENCOUNTER
Spoke to pt and notified appt missed to can be rescheduled to 05/28/24 @ 3:00 pm pending override approval, pt agrees.

## 2024-06-01 PROBLEM — J90 PLEURAL EFFUSION ON LEFT: Status: ACTIVE | Noted: 2024-06-01

## 2024-06-01 PROBLEM — J18.9 COMMUNITY ACQUIRED PNEUMONIA: Status: ACTIVE | Noted: 2024-06-01

## 2024-06-01 PROBLEM — R07.9 CHEST PAIN: Status: ACTIVE | Noted: 2022-12-13

## 2024-09-02 PROBLEM — J18.9 COMMUNITY ACQUIRED PNEUMONIA: Status: RESOLVED | Noted: 2024-06-01 | Resolved: 2024-09-02

## 2024-09-24 ENCOUNTER — TELEPHONE (OUTPATIENT)
Dept: ORTHOPEDICS | Facility: CLINIC | Age: 54
End: 2024-09-24
Payer: MEDICAID

## 2024-09-24 NOTE — TELEPHONE ENCOUNTER
"----- Message from Marta Eleazar sent at 9/24/2024 10:07 AM CDT -----  Regarding: pt advice  Contact: [phone]  .Name Of Caller: Self     Contact Preference?:[phone]     What is the nature of the call?: in reference to looking for something sooner than scheduled 10/10/24. Pls silvio      Additional Notes:  "Thank you for all that you do for our patients"  "

## 2024-09-24 NOTE — TELEPHONE ENCOUNTER
----- Message from Angela Segovia sent at 9/24/2024  9:39 AM CDT -----  Contact: [place]  [phone]  Patient would like for someone to give her a call in regards to her wanting to schedule an appointment.

## 2025-03-19 ENCOUNTER — TELEPHONE (OUTPATIENT)
Dept: HEMATOLOGY/ONCOLOGY | Facility: CLINIC | Age: 55
End: 2025-03-19
Payer: MEDICAID

## 2025-03-19 NOTE — TELEPHONE ENCOUNTER
----- Message from Fani sent at 3/19/2025  2:36 PM CDT -----  Please advise.  ----- Message -----  From: Silas Snow  Sent: 3/19/2025   2:32 PM CDT  To: [facility]#    Type:  Patient Returning CallWho Called:ptWho Left Message for Patient:Does the patient know what this is regarding?:apptWould the patient rather a call back or a response via [facility]? callKnimbus Call Back Number:[phone] Information: pt states URGENT she is not doing too well and would like a call back today to get an appt scheduled. Thank you

## 2025-03-19 NOTE — NURSING
Spoke to patient regarding appt with Hematology patient missed/no showed for appt in the past appt rescheduled time and location confirmed she verbalized understanding   Oncology Navigation   Intake  Type of Referral: External  Date of Referral: 04/26/24  Initial Nurse Navigator Contact: 03/19/25  Referral to Initial Contact Timeline (days): 327  First Appointment Available: 03/25/25  Appointment Date: 03/25/25  First Available Date vs. Scheduled Date (days): 0     Treatment                              Acuity      Follow Up  No follow-ups on file.

## 2025-07-22 ENCOUNTER — HOSPITAL ENCOUNTER (OUTPATIENT)
Dept: RADIOLOGY | Facility: HOSPITAL | Age: 55
Discharge: HOME | End: 2025-07-22
Payer: MEDICAID

## 2025-07-22 ENCOUNTER — OFFICE VISIT (OUTPATIENT)
Facility: CLINIC | Age: 55
End: 2025-07-22
Payer: MEDICAID

## 2025-07-22 VITALS
HEART RATE: 61 BPM | DIASTOLIC BLOOD PRESSURE: 74 MMHG | HEIGHT: 68 IN | BODY MASS INDEX: 27.69 KG/M2 | SYSTOLIC BLOOD PRESSURE: 111 MMHG

## 2025-07-22 DIAGNOSIS — M75.41 IMPINGEMENT SYNDROME OF RIGHT SHOULDER: ICD-10-CM

## 2025-07-22 DIAGNOSIS — M25.511 CHRONIC RIGHT SHOULDER PAIN: ICD-10-CM

## 2025-07-22 DIAGNOSIS — G89.29 CHRONIC RIGHT SHOULDER PAIN: ICD-10-CM

## 2025-07-22 DIAGNOSIS — M75.101 ROTATOR CUFF SYNDROME OF RIGHT SHOULDER: Primary | ICD-10-CM

## 2025-07-22 PROBLEM — R10.9 INTRACTABLE ABDOMINAL PAIN: Status: ACTIVE | Noted: 2019-08-13

## 2025-07-22 PROBLEM — M43.06 SPONDYLOLYSIS OF LUMBAR REGION: Status: ACTIVE | Noted: 2019-07-03

## 2025-07-22 PROBLEM — M16.0 OSTEOARTHRITIS OF BOTH HIPS: Status: ACTIVE | Noted: 2019-07-03

## 2025-07-22 PROBLEM — M75.51 SUBACROMIAL BURSITIS OF RIGHT SHOULDER JOINT: Status: ACTIVE | Noted: 2018-12-26

## 2025-07-22 PROBLEM — M54.12 CERVICAL RADICULOPATHY: Status: ACTIVE | Noted: 2019-06-18

## 2025-07-22 PROBLEM — I89.0 LYMPHEDEMA: Status: ACTIVE | Noted: 2019-05-09

## 2025-07-22 PROBLEM — K22.70 BARRETT'S ESOPHAGUS: Status: ACTIVE | Noted: 2018-10-10

## 2025-07-22 PROBLEM — M43.12 SPONDYLOLISTHESIS OF CERVICAL REGION: Status: ACTIVE | Noted: 2019-07-03

## 2025-07-22 PROBLEM — M70.61 TROCHANTERIC BURSITIS OF BOTH HIPS: Status: ACTIVE | Noted: 2019-07-03

## 2025-07-22 PROBLEM — K92.1 MELENA: Status: ACTIVE | Noted: 2023-02-18

## 2025-07-22 PROBLEM — K65.1 POSTOPERATIVE INTRA-ABDOMINAL ABSCESS: Status: ACTIVE | Noted: 2024-06-13

## 2025-07-22 PROBLEM — G54.9 MYELORADICULOPATHY: Status: ACTIVE | Noted: 2019-07-03

## 2025-07-22 PROBLEM — D64.9 ANEMIA: Status: ACTIVE | Noted: 2019-05-09

## 2025-07-22 PROBLEM — M75.51 BURSITIS OF BOTH SHOULDERS: Status: ACTIVE | Noted: 2019-07-03

## 2025-07-22 PROBLEM — M70.62 TROCHANTERIC BURSITIS OF BOTH HIPS: Status: ACTIVE | Noted: 2019-07-03

## 2025-07-22 PROBLEM — M50.30 DEGENERATIVE DISC DISEASE, CERVICAL: Status: ACTIVE | Noted: 2019-07-03

## 2025-07-22 PROBLEM — M54.16 LUMBAR RADICULOPATHY: Status: ACTIVE | Noted: 2019-06-18

## 2025-07-22 PROBLEM — M51.369 DDD (DEGENERATIVE DISC DISEASE), LUMBAR: Status: ACTIVE | Noted: 2019-07-03

## 2025-07-22 PROBLEM — M85.80 OSTEOPENIA: Status: ACTIVE | Noted: 2021-02-05

## 2025-07-22 PROBLEM — M75.01 ADHESIVE CAPSULITIS OF RIGHT SHOULDER: Status: ACTIVE | Noted: 2018-12-26

## 2025-07-22 PROBLEM — E03.9 HYPOTHYROIDISM: Status: ACTIVE | Noted: 2019-05-09

## 2025-07-22 PROBLEM — R10.13 EPIGASTRIC PAIN: Status: ACTIVE | Noted: 2019-08-01

## 2025-07-22 PROBLEM — M43.16 SPONDYLOLISTHESIS OF LUMBAR REGION: Status: ACTIVE | Noted: 2019-07-03

## 2025-07-22 PROBLEM — T81.43XA POSTOPERATIVE INTRA-ABDOMINAL ABSCESS: Status: ACTIVE | Noted: 2024-06-13

## 2025-07-22 PROBLEM — M75.52 BURSITIS OF BOTH SHOULDERS: Status: ACTIVE | Noted: 2019-07-03

## 2025-07-22 PROBLEM — Z98.84 HISTORY OF GASTRIC BYPASS: Status: ACTIVE | Noted: 2019-05-09

## 2025-07-22 PROBLEM — K21.00 GASTROESOPHAGEAL REFLUX DISEASE WITH ESOPHAGITIS: Status: ACTIVE | Noted: 2019-05-09

## 2025-07-22 PROCEDURE — 99214 OFFICE O/P EST MOD 30 MIN: CPT | Mod: PBBFAC,25

## 2025-07-22 PROCEDURE — 99999 PR PBB SHADOW E&M-EST. PATIENT-LVL IV: CPT | Mod: PBBFAC,,,

## 2025-07-22 PROCEDURE — 99214 OFFICE O/P EST MOD 30 MIN: CPT | Mod: S$PBB,,,

## 2025-07-22 PROCEDURE — 1159F MED LIST DOCD IN RCRD: CPT | Mod: CPTII,,,

## 2025-07-22 PROCEDURE — 3078F DIAST BP <80 MM HG: CPT | Mod: CPTII,,,

## 2025-07-22 PROCEDURE — 73030 X-RAY EXAM OF SHOULDER: CPT | Mod: TC,RT

## 2025-07-22 PROCEDURE — 73030 X-RAY EXAM OF SHOULDER: CPT | Mod: 26,RT,, | Performed by: RADIOLOGY

## 2025-07-22 PROCEDURE — 3074F SYST BP LT 130 MM HG: CPT | Mod: CPTII,,,

## 2025-07-22 PROCEDURE — 3008F BODY MASS INDEX DOCD: CPT | Mod: CPTII,,,

## 2025-07-22 RX ORDER — METHOCARBAMOL 500 MG/1
500 TABLET, FILM COATED ORAL NIGHTLY
Qty: 30 TABLET | Refills: 0 | Status: SHIPPED | OUTPATIENT
Start: 2025-07-22 | End: 2025-08-21

## 2025-07-22 RX ORDER — TRAMADOL HYDROCHLORIDE 50 MG/1
50 TABLET, FILM COATED ORAL EVERY 6 HOURS PRN
Qty: 28 TABLET | Refills: 0 | Status: SHIPPED | OUTPATIENT
Start: 2025-07-22 | End: 2025-07-29

## 2025-07-23 NOTE — PROGRESS NOTES
Subjective:      Patient ID: Gisela Monique is a 55 y.o. female.    Chief Complaint: Pain of the Right Shoulder    HPI:   Gisela presents for evaluation of right shoulder pain following a fall at work on 6/27/25. Pain is severe, stabbing, localized to the right shoulder, and radiates up towards the neck/down to the elbow. She initially thought she could manage, but pain has worsened. She reports significant functional limitations, including inability to put on a necklace, reach behind her back, or do her hair. When the pain intensifies at the end of the day, there is no relief.    She visited the ER 5-7 days after the fall on 7/2/25 due to persistent pain. She was given Norco for pain management and a sling, which provided temporary relief. However, she discontinued use of both, thinking she was improving. She has been using Voltaren gel and a pain patch at home for symptom management, but reports these measures are ineffective. She was referred to orthopedics following the ER visit but could not get an appointment with [facility] until September 2025. She has a history of gastric bypass surgery and reports she experienced severe bleeding and underwent five surgeries less than a year ago, which has impacted her ability to take certain medications, particularly NSAIDs.    The pain is becoming progressively worse. She reports that the pain is a 9 /10 sharp, sore, radiating, and pain with movement today. The pain is affecting ADLs and limiting desired level of activity. Denies numbness, tingling. Moderate pain to bear weight.    Ambulating: unassisted  Diabetes: noted in chart, pt denies. Last A1C - 4.6  Smoking:  She has never smoked.  History of DVT/PE: Negative    PAST MEDICAL HISTORY:    Past Medical History:   Diagnosis Date    Anxiety     Bipolar disorder, unspecified     Depression     Diabetes mellitus     Rheumatoid arthritis     Thyroid disease      PAST SURGICAL HISTORY:    Past Surgical History:   Procedure  Laterality Date    ARTHROSCOPIC CHONDROPLASTY OF KNEE JOINT Left 3/27/2023    Procedure: ARTHROSCOPY, KNEE, WITH CHONDROPLASTY;  Surgeon: Amado Rock MD;  Location: [facility];  Service: Orthopedics;  Laterality: Left;  [employer] video   audra suchondroplasty   c-arm     SECTION      GASTRIC BYPASS      HYSTERECTOMY      KNEE ARTHROSCOPY Left 2023    with chondroplasty    KNEE CARTILAGE SURGERY Bilateral     x2 to Right, x1 to Left     FAMILY HISTORY:    Family History   Problem Relation Name Age of Onset    Breast cancer Maternal Grandmother      Colon cancer Neg Hx      Ovarian cancer Neg Hx       SOCIAL HISTORY:    Social History     Occupational History    Not on file   Tobacco Use    Smoking status: Never    Smokeless tobacco: Never   Substance and Sexual Activity    Alcohol use: Yes     Comment: occasionally    Drug use: Not Currently    Sexual activity: Not Currently     Partners: Male      MEDICATIONS: Current Medications[1]  ALLERGIES: Review of patient's allergies indicates:  No Known Allergies    Review of Systems:  Constitution: Negative for chills, fever and night sweats.   HENT: Negative for congestion and headaches.    Eyes: Negative for blurred vision or vision loss.  Cardiovascular: Negative for chest pain and syncope.   Respiratory: Negative for cough and shortness of breath.    Hematologic/Lymphatic: Negative for bleeding problem. Does not bruise/bleed easily.   Skin: Negative for dry skin, itching and rash.   Musculoskeletal: See HPI.   Gastrointestinal: Negative for abdominal pain and bowel incontinence.   Genitourinary: Negative for bladder incontinence and nocturia.   Neurological: Negative for disturbances in coordination, loss of balance and seizures.   Psychiatric/Behavioral: Negative for depression. The patient does not have insomnia.      Objective:      Vitals:    25 1830   BP: 111/74   Pulse: 61     PHYSICAL EXAM:  General: Alert & oriented x3, well-developed and  well-nourished, in no acute distress, sitting comfortably in the exam room.  Skin: Warm and dry. Capillary refill less than 2 seconds.   Head: Normocephalic and atraumatic.   Eyes: Sclera appear normal.   Nose: No deformities seen.   Ears: No deformities seen.   Neck: No tracheal deviation present.   Pulmonary/Chest: Breathing unlabored.   Neurological: Alert and oriented to person, place, and time.   Psychiatric: Mood is pleasant and affect appropriate.     RIGHT SHOULDER     OBSERVATION:     Swelling  none  Deformity  none   Discoloration  none   Scapular winging none   Scars   none  Atrophy  none    TENDERNESS           Clavicle   Negative         AC Jt.    +        SC Jt.    Negative          Acromion:  Negative        Scapular Spine Negative   Supraspinatus  +       Infraspinatus  Negative   LH Biceps   Negative   Greater Tub.  +   Trapezius  Negative   Cervical spine  Negative        ROM: (* = with pain)           FE    *110°       ER at 0°    *50°        ER at 90° ABD  *80°       IR at 90°  ABD   NA         IR (spine level)   *T10         STRENGTH: (* = with pain)    SCAPTION   5/5        IR    3/5       ER    3/5       BICEPS   5/5       Deltoid    5/5         SPECIAL TESTS: Painful side   Empty can test  Positive  Full can test   Positive  Resisted internal rotation Positive  Resisted external rotation Negative  Neer's test   Positive  Gibson'-Will test Positive  Drop Arm                                Positive  Cross Body Adduction            Positive  Speed's                                  Negative  Lift Off                                     Positive  Belly Press   Negative         STABILITY TESTING     Translation    Anterior  Normal      Posterior  Normal     Sulcus   < 10mm     Neurovascular Exam Bilateral UEs:  Sensation intact to light touch in the distal median, radial, and ulnar nerve distributions bilaterally.  Capillary refill intact <2 seconds in all digits bilaterally     NECK:   Painless FROM and spinous processes non-tender. Negative Spurlings sign.      Imaging:   X-Rays: 3 view  right Shoulder X-rays ordered/reviewed by me showing DJD with no evidence of fracture or dislocation. There is no obvious malalignment. No evidence of masses, lesions or foreign bodies.     Assessment:      1. Rotator cuff syndrome of right shoulder  Ambulatory referral/consult to Orthopedics    MRI Shoulder Without Contrast Right    traMADoL (ULTRAM) 50 mg tablet    methocarbamoL (ROBAXIN) 500 MG Tab    Ambulatory Referral/Consult to Physical Therapy      2. Chronic right shoulder pain  X-ray Shoulder 2 or More Views Right      3. Impingement syndrome of right shoulder  Ambulatory referral/consult to Orthopedics    MRI Shoulder Without Contrast Right    traMADoL (ULTRAM) 50 mg tablet    methocarbamoL (ROBAXIN) 500 MG Tab    Ambulatory Referral/Consult to Physical Therapy         Plan:       Orders Placed This Encounter    X-ray Shoulder 2 or More Views Right    MRI Shoulder Without Contrast Right    Ambulatory referral/consult to Orthopedics    Ambulatory Referral/Consult to Physical Therapy    traMADoL (ULTRAM) 50 mg tablet    methocarbamoL (ROBAXIN) 500 MG Tab     I made the decision to obtain old records of the patient including previous notes and imaging. New imaging was ordered today of the extremity or extremities evaluated. I independently reviewed and interpreted the radiographs today as well as prior imaging. Reviewed imaging in detail with patient.     I explained the nature of the problem to the patient. I discussed at length with the patient all the different treatment options available for her right shoulder including anti-inflammatories, acetaminophen, rest, ice, heat, formal physical therapy including strengthening and stretching exercises, home exercise programs, dry needling and corticosteroid injections. I explained the potential role of surgery in the treatment of this condition. The patient  understands that if non-surgical measures do not adequately control symptoms, surgery will be considered in the future.     Medications: Prescription for tramadol and robaxin provided today for PRN pain management.   No NSAIDs due to hx of GI bleed, gastric bypass, and GERD.  Tramadol one tablet every 6 hours as needed for significant pain. Do NOT take with robaxin.  Robaxin one tablet nightly PRN pain/muscle spasms nightly before bed only due to increased risk for falls.  Physical Therapy: Ambulatory referral to physical therapy for RC protocol, right shoulder ROM, stretching, strengthening, and conditioning.  Procedures:  None today. Consider CSI if symptoms worsen.   Discussed potential future injection into the shoulder, pending MRI results.   Explained that injection would prolong ability to have surgery on affected extremity for 3 months if received.  Pain Management: Ice/heat compress to the affected area 2-3x a day for 15-20 minutes as needed for pain management.  Avoid using a sling.   Ordered MRI Shoulder to evaluate RC.        Follow-Up: Orthopedic referral placed for follow-up.    All questions were answered and patient is agreeable to the above plan. The patient will contact us if they have any questions or concerns in the interim.      NICOLA Cornejo  [facility]    This note was generated with the assistance of ambient listening technology. Verbal consent was obtained by the patient and accompanying visitor(s) for the recording of patient appointment to facilitate this note. I attest to having reviewed and edited the generated note for accuracy, though some syntax or spelling errors may persist. Please contact the author of this note for any clarification.       [1]   Current Outpatient Medications:     diclofenac sodium (VOLTAREN) 1 % Gel, Apply 2 g topically 4 (four) times daily., Disp: 350 g, Rfl: 2    ergocalciferol (ERGOCALCIFEROL) 50,000 unit Cap, , Disp:  ", Rfl:     EScitalopram oxalate (LEXAPRO) 20 MG tablet, Take 1 tablet (20 mg total) by mouth once daily., Disp: 30 tablet, Rfl: 11    folic acid (FOLVITE) 1 MG tablet, Take 1 tablet (1 mg total) by mouth once daily., Disp: 90 tablet, Rfl: 0    gabapentin (NEURONTIN) 300 MG capsule, Take 1 capsule (300 mg total) by mouth 3 (three) times daily., Disp: 90 capsule, Rfl: 11    hydrOXYzine pamoate (VISTARIL) 50 MG Cap, Take 50 mg by mouth nightly as needed., Disp: , Rfl:     ibuprofen (ADVIL,MOTRIN) 800 MG tablet, Take 1 tablet (800 mg total) by mouth every 6 (six) hours as needed for Pain., Disp: 20 tablet, Rfl: 0    insulin syringe-needle U-100 (BD INSULIN SYRINGE) 1 mL 26 x 1/2" Syrg, Use to give b12 shot intramuscularly, Disp: 15 each, Rfl: 1    levothyroxine (SYNTHROID) 50 MCG tablet, Take 1 tablet (50 mcg total) by mouth before breakfast., Disp: 30 tablet, Rfl: 11    methocarbamoL (ROBAXIN) 500 MG Tab, Take 1 tablet (500 mg total) by mouth nightly., Disp: 30 tablet, Rfl: 0    methylPREDNISolone (MEDROL DOSEPACK) 4 mg tablet, use as directed, Disp: 21 each, Rfl: 0    multivitamin-min-iron-FA-vit K (BARIATRIC MULTIVITAMINS) 45 mg iron- 800 mcg-120 mcg Cap, Take 1 capsule by mouth once daily., Disp: 30 capsule, Rfl: 11    pantoprazole (PROTONIX) 40 MG tablet, Take 1 tablet (40 mg total) by mouth once daily., Disp: 30 tablet, Rfl: 11    propranoloL (INDERAL) 40 MG tablet, Take 1 tablet (40 mg total) by mouth 2 (two) times daily., Disp: 60 tablet, Rfl: 11    thiamine 100 MG tablet, Take 1 tablet (100 mg total) by mouth once daily., Disp: 90 tablet, Rfl: 2    traMADoL (ULTRAM) 50 mg tablet, Take 1 tablet (50 mg total) by mouth every 6 (six) hours as needed for Pain., Disp: 28 tablet, Rfl: 0    "

## 2025-08-18 ENCOUNTER — OFFICE VISIT (OUTPATIENT)
Dept: SPORTS MEDICINE | Facility: CLINIC | Age: 55
End: 2025-08-18
Payer: MEDICAID

## 2025-08-18 DIAGNOSIS — M75.101 ROTATOR CUFF SYNDROME, RIGHT: Primary | ICD-10-CM

## 2025-08-18 PROCEDURE — 99499 UNLISTED E&M SERVICE: CPT | Mod: S$PBB,,, | Performed by: ORTHOPAEDIC SURGERY

## 2025-08-19 ENCOUNTER — OFFICE VISIT (OUTPATIENT)
Dept: SPORTS MEDICINE | Facility: CLINIC | Age: 55
End: 2025-08-19
Payer: MEDICAID

## 2025-08-19 VITALS
SYSTOLIC BLOOD PRESSURE: 142 MMHG | DIASTOLIC BLOOD PRESSURE: 84 MMHG | WEIGHT: 180.44 LBS | HEART RATE: 89 BPM | HEIGHT: 68 IN | BODY MASS INDEX: 27.35 KG/M2

## 2025-08-19 DIAGNOSIS — M19.011 GLENOHUMERAL ARTHRITIS, RIGHT: Primary | ICD-10-CM

## 2025-08-19 PROCEDURE — 1160F RVW MEDS BY RX/DR IN RCRD: CPT | Mod: CPTII,,, | Performed by: ORTHOPAEDIC SURGERY

## 2025-08-19 PROCEDURE — 3077F SYST BP >= 140 MM HG: CPT | Mod: CPTII,,, | Performed by: ORTHOPAEDIC SURGERY

## 2025-08-19 PROCEDURE — 3079F DIAST BP 80-89 MM HG: CPT | Mod: CPTII,,, | Performed by: ORTHOPAEDIC SURGERY

## 2025-08-19 PROCEDURE — 3008F BODY MASS INDEX DOCD: CPT | Mod: CPTII,,, | Performed by: ORTHOPAEDIC SURGERY

## 2025-08-19 PROCEDURE — 99999 PR PBB SHADOW E&M-EST. PATIENT-LVL III: CPT | Mod: PBBFAC,,, | Performed by: ORTHOPAEDIC SURGERY

## 2025-08-19 PROCEDURE — 99214 OFFICE O/P EST MOD 30 MIN: CPT | Mod: S$PBB,,, | Performed by: ORTHOPAEDIC SURGERY

## 2025-08-19 PROCEDURE — 1159F MED LIST DOCD IN RCRD: CPT | Mod: CPTII,,, | Performed by: ORTHOPAEDIC SURGERY

## 2025-08-19 PROCEDURE — 99213 OFFICE O/P EST LOW 20 MIN: CPT | Mod: PBBFAC | Performed by: ORTHOPAEDIC SURGERY
